# Patient Record
Sex: FEMALE | Race: WHITE | Employment: OTHER | URBAN - METROPOLITAN AREA
[De-identification: names, ages, dates, MRNs, and addresses within clinical notes are randomized per-mention and may not be internally consistent; named-entity substitution may affect disease eponyms.]

---

## 2019-08-14 RX ORDER — ZOLEDRONIC ACID 5 MG/100ML
5 INJECTION, SOLUTION INTRAVENOUS ONCE
Status: COMPLETED | OUTPATIENT
Start: 2019-08-19 | End: 2019-08-19

## 2019-08-19 ENCOUNTER — HOSPITAL ENCOUNTER (OUTPATIENT)
Dept: INFUSION THERAPY | Age: 74
Discharge: HOME | End: 2019-08-19
Payer: MEDICARE

## 2019-08-19 VITALS
SYSTOLIC BLOOD PRESSURE: 96 MMHG | RESPIRATION RATE: 18 BRPM | DIASTOLIC BLOOD PRESSURE: 49 MMHG | TEMPERATURE: 98.7 F | HEART RATE: 72 BPM

## 2019-08-19 LAB
ALBUMIN SERPL-MCNC: 3.6 G/DL (ref 3.5–5)
ANION GAP SERPL CALC-SCNC: 8 MMOL/L (ref 5–15)
BUN SERPL-MCNC: 20 MG/DL (ref 6–20)
BUN/CREAT SERPL: 20 (ref 12–20)
CALCIUM SERPL-MCNC: 9.3 MG/DL (ref 8.5–10.1)
CHLORIDE SERPL-SCNC: 110 MMOL/L (ref 97–108)
CO2 SERPL-SCNC: 25 MMOL/L (ref 21–32)
CREAT SERPL-MCNC: 1.01 MG/DL (ref 0.55–1.02)
GLUCOSE SERPL-MCNC: 82 MG/DL (ref 65–100)
PHOSPHATE SERPL-MCNC: 2.8 MG/DL (ref 2.6–4.7)
POTASSIUM SERPL-SCNC: 3.9 MMOL/L (ref 3.5–5.1)
SODIUM SERPL-SCNC: 143 MMOL/L (ref 136–145)

## 2019-08-19 PROCEDURE — 96374 THER/PROPH/DIAG INJ IV PUSH: CPT

## 2019-08-19 PROCEDURE — 74011250636 HC RX REV CODE- 250/636: Performed by: PHYSICIAN ASSISTANT

## 2019-08-19 PROCEDURE — 80069 RENAL FUNCTION PANEL: CPT

## 2019-08-19 PROCEDURE — 36415 COLL VENOUS BLD VENIPUNCTURE: CPT

## 2019-08-19 RX ORDER — SODIUM CHLORIDE 0.9 % (FLUSH) 0.9 %
5-10 SYRINGE (ML) INJECTION AS NEEDED
Status: DISCONTINUED | OUTPATIENT
Start: 2019-08-19 | End: 2019-08-20 | Stop reason: HOSPADM

## 2019-08-19 RX ADMIN — ZOLEDRONIC ACID 5 MG: 5 INJECTION, SOLUTION INTRAVENOUS at 14:53

## 2019-08-19 RX ADMIN — Medication 10 ML: at 13:08

## 2019-08-19 NOTE — PROGRESS NOTES
[facility] Short Note                       Date: 2019    Name: Valerie Rm    MRN: [id number]         : 1945      1305 Pt admit to [facility] for Zoledronic Acid. Pt ambulatory in stable condition. Assessment completed. No new concerns voiced. Peripheral IV established with positive blood return. Labs drawn and sent for processing     Ms. Hopper vitals were reviewed prior to and after treatment. Patient Vitals for the past 12 hrs:   Temp Pulse Resp BP   19 1305 98.7 °F (37.1 °C) 72 18 96/49     Lab results were obtained and reviewed. Recent Results (from the past 12 hour(s))   RENAL FUNCTION PANEL    Collection Time: 19  1:09 PM   Result Value Ref Range    Sodium 143 136 - 145 mmol/L    Potassium 3.9 3.5 - 5.1 mmol/L    Chloride 110 (H) 97 - 108 mmol/L    CO2 25 21 - 32 mmol/L    Anion gap 8 5 - 15 mmol/L    Glucose 82 65 - 100 mg/dL    BUN 20 6 - 20 MG/DL    Creatinine 1.01 0.55 - 1.02 MG/DL    BUN/Creatinine ratio 20 12 - 20      GFR est AA >60 >60 ml/min/1.73m2    GFR est non-AA 54 (L) >60 ml/min/1.73m2    Calcium 9.3 8.5 - 10.1 MG/DL    Phosphorus 2.8 2.6 - 4.7 MG/DL    Albumin 3.6 3.5 - 5.0 g/dL       Medications given:   Medications Administered     sodium chloride (NS) flush 5-10 mL     Admin Date  2019 Action  Given Dose  10 mL Route  IntraVENous Administered By  Edita Campoverde RN          zoledronic acid (RECLAST) IVPB 5 mg     Admin Date  2019 Action  Given Dose  5 mg Rate  400 mL/hr Route  IntraVENous Administered By  Edita Campoverde RN                PIV maintained positive blood return. Line flushed and removed per protocol. Ms. Jocelyn Christopher tolerated the infusion, and had no complaints. Ms. Jocelyn Christopher was discharged from [place] [address] in stable condition at 1510. No future appointments.     Amarjit Josue RN  2019  4:56 PM

## 2019-11-15 ENCOUNTER — HOSPITAL ENCOUNTER (OUTPATIENT)
Dept: GENERAL RADIOLOGY | Age: 74
Discharge: HOME | End: 2019-11-15
Attending: SPECIALIST
Payer: MEDICARE

## 2019-11-15 DIAGNOSIS — R13.10 DYSPHAGIA: ICD-10-CM

## 2019-11-15 DIAGNOSIS — R63.4 WEIGHT LOSS: ICD-10-CM

## 2019-11-15 DIAGNOSIS — K22.0 ACHALASIA: ICD-10-CM

## 2019-11-15 PROCEDURE — 74220 X-RAY XM ESOPHAGUS 1CNTRST: CPT

## 2020-01-28 ENCOUNTER — HOSPITAL ENCOUNTER (OUTPATIENT)
Age: 75
Setting detail: OUTPATIENT SURGERY
Discharge: HOME | End: 2020-01-28
Attending: SPECIALIST | Admitting: SPECIALIST
Payer: MEDICARE

## 2020-01-28 ENCOUNTER — ANESTHESIA EVENT (OUTPATIENT)
Dept: ENDOSCOPY | Age: 75
End: 2020-01-28
Payer: MEDICARE

## 2020-01-28 ENCOUNTER — ANESTHESIA (OUTPATIENT)
Dept: ENDOSCOPY | Age: 75
End: 2020-01-28
Payer: MEDICARE

## 2020-01-28 VITALS
OXYGEN SATURATION: 94 % | DIASTOLIC BLOOD PRESSURE: 55 MMHG | HEIGHT: 62 IN | TEMPERATURE: 98.2 F | SYSTOLIC BLOOD PRESSURE: 91 MMHG | RESPIRATION RATE: 20 BRPM | BODY MASS INDEX: 22.08 KG/M2 | HEART RATE: 80 BPM | WEIGHT: 120 LBS

## 2020-01-28 PROCEDURE — 74011250636 HC RX REV CODE- 250/636: Performed by: NURSE ANESTHETIST, CERTIFIED REGISTERED

## 2020-01-28 PROCEDURE — 76060000031 HC ANESTHESIA FIRST 0.5 HR: Performed by: SPECIALIST

## 2020-01-28 PROCEDURE — 77030018712 HC DEV BLLN INFL BSC -B: Performed by: SPECIALIST

## 2020-01-28 PROCEDURE — 74011000250 HC RX REV CODE- 250: Performed by: NURSE ANESTHETIST, CERTIFIED REGISTERED

## 2020-01-28 PROCEDURE — 76040000019: Performed by: SPECIALIST

## 2020-01-28 PROCEDURE — C1726 CATH, BAL DIL, NON-VASCULAR: HCPCS | Performed by: SPECIALIST

## 2020-01-28 RX ORDER — FLUMAZENIL 0.1 MG/ML
0.2 INJECTION INTRAVENOUS
Status: DISCONTINUED | OUTPATIENT
Start: 2020-01-28 | End: 2020-01-28 | Stop reason: HOSPADM

## 2020-01-28 RX ORDER — LIDOCAINE HYDROCHLORIDE 20 MG/ML
INJECTION, SOLUTION EPIDURAL; INFILTRATION; INTRACAUDAL; PERINEURAL AS NEEDED
Status: DISCONTINUED | OUTPATIENT
Start: 2020-01-28 | End: 2020-01-28 | Stop reason: HOSPADM

## 2020-01-28 RX ORDER — SODIUM CHLORIDE 0.9 % (FLUSH) 0.9 %
5-40 SYRINGE (ML) INJECTION EVERY 8 HOURS
Status: DISCONTINUED | OUTPATIENT
Start: 2020-01-28 | End: 2020-01-28 | Stop reason: HOSPADM

## 2020-01-28 RX ORDER — SODIUM CHLORIDE 0.9 % (FLUSH) 0.9 %
5-40 SYRINGE (ML) INJECTION AS NEEDED
Status: DISCONTINUED | OUTPATIENT
Start: 2020-01-28 | End: 2020-01-28 | Stop reason: HOSPADM

## 2020-01-28 RX ORDER — PROPOFOL 10 MG/ML
INJECTION, EMULSION INTRAVENOUS AS NEEDED
Status: DISCONTINUED | OUTPATIENT
Start: 2020-01-28 | End: 2020-01-28 | Stop reason: HOSPADM

## 2020-01-28 RX ORDER — SODIUM CHLORIDE 9 MG/ML
INJECTION, SOLUTION INTRAVENOUS
Status: DISCONTINUED | OUTPATIENT
Start: 2020-01-28 | End: 2020-01-28 | Stop reason: HOSPADM

## 2020-01-28 RX ORDER — NALOXONE HYDROCHLORIDE 0.4 MG/ML
0.4 INJECTION, SOLUTION INTRAMUSCULAR; INTRAVENOUS; SUBCUTANEOUS
Status: DISCONTINUED | OUTPATIENT
Start: 2020-01-28 | End: 2020-01-28 | Stop reason: HOSPADM

## 2020-01-28 RX ORDER — DEXMEDETOMIDINE HYDROCHLORIDE 100 UG/ML
INJECTION, SOLUTION INTRAVENOUS AS NEEDED
Status: DISCONTINUED | OUTPATIENT
Start: 2020-01-28 | End: 2020-01-28 | Stop reason: HOSPADM

## 2020-01-28 RX ORDER — SODIUM CHLORIDE 9 MG/ML
50 INJECTION, SOLUTION INTRAVENOUS CONTINUOUS
Status: DISCONTINUED | OUTPATIENT
Start: 2020-01-28 | End: 2020-01-28 | Stop reason: HOSPADM

## 2020-01-28 RX ORDER — ATROPINE SULFATE 0.1 MG/ML
0.5 INJECTION INTRAVENOUS
Status: DISCONTINUED | OUTPATIENT
Start: 2020-01-28 | End: 2020-01-28 | Stop reason: HOSPADM

## 2020-01-28 RX ORDER — EPINEPHRINE 0.1 MG/ML
1 INJECTION INTRACARDIAC; INTRAVENOUS
Status: DISCONTINUED | OUTPATIENT
Start: 2020-01-28 | End: 2020-01-28 | Stop reason: HOSPADM

## 2020-01-28 RX ORDER — DEXTROMETHORPHAN/PSEUDOEPHED 2.5-7.5/.8
1.2 DROPS ORAL
Status: DISCONTINUED | OUTPATIENT
Start: 2020-01-28 | End: 2020-01-28 | Stop reason: HOSPADM

## 2020-01-28 RX ADMIN — LIDOCAINE HYDROCHLORIDE 80 MG: 20 INJECTION, SOLUTION EPIDURAL; INFILTRATION; INTRACAUDAL; PERINEURAL at 14:29

## 2020-01-28 RX ADMIN — SODIUM CHLORIDE: 900 INJECTION, SOLUTION INTRAVENOUS at 14:22

## 2020-01-28 RX ADMIN — PROPOFOL 25 MG: 10 INJECTION, EMULSION INTRAVENOUS at 14:37

## 2020-01-28 RX ADMIN — DEXMEDETOMIDINE HYDROCHLORIDE 8 MCG: 100 INJECTION, SOLUTION, CONCENTRATE INTRAVENOUS at 14:36

## 2020-01-28 RX ADMIN — PROPOFOL 25 MG: 10 INJECTION, EMULSION INTRAVENOUS at 14:35

## 2020-01-28 RX ADMIN — PROPOFOL 75 MG: 10 INJECTION, EMULSION INTRAVENOUS at 14:29

## 2020-01-28 RX ADMIN — PROPOFOL 25 MG: 10 INJECTION, EMULSION INTRAVENOUS at 14:33

## 2020-01-28 NOTE — ROUTINE PROCESS
Alexi Myers 
1945 
[id number] Situation: 
Verbal report received from: KATINA Huerta Procedure: Procedure(s): ESOPHAGOGASTRODUODENOSCOPY (EGD) ESOPHAGEAL DILATION Background: 
 
Preoperative diagnosis: DYSPHAGIA, WEIGHT LOSS Postoperative diagnosis: dysphagia 
esophageal stricture :  Dr. Nevin Riley Assistant(s): Endoscopy RN-1: Yoko Miranda RN Endoscopy RN-2: Marcus Armenta RN Specimens: * No specimens in log * H. Pylori  no Assessment: 
Intra-procedure medications Anesthesia gave intra-procedure sedation and medications, see anesthesia flow sheet yes Intravenous fluids: NS@ Reddy Galea Vital signs stable Abdominal assessment: round and soft Recommendation: 
Discharge patient per MD order. Family or Friend Permission to share finding with family or friend yes

## 2020-01-28 NOTE — PERIOP NOTES

## 2020-01-28 NOTE — ANESTHESIA POSTPROCEDURE EVALUATION
Procedure(s):  ESOPHAGOGASTRODUODENOSCOPY (EGD)  ESOPHAGEAL DILATION. MAC    <BSHSIANPOST>    Vitals Value Taken Time   BP 89/48 1/28/2020  2:50 PM   Temp     Pulse 76 1/28/2020  2:52 PM   Resp 36 1/28/2020  2:52 PM   SpO2 95 % 1/28/2020  2:52 PM   Vitals shown include unvalidated device data.

## 2020-01-28 NOTE — H&P
Pre-endoscopy H and P     The patient was seen and examined in the endoscopy suite. The airway was assessed and docuemented. The problem list and medications were reviewed. Patient Active Problem List   Diagnosis Code    Acute chest pain R07.9    Nausea and vomiting R11.2     Social History     Socioeconomic History    Marital status: SINGLE     Spouse name: Not on file    Number of children: Not on file    Years of education: Not on file    Highest education level: Not on file   Occupational History    Not on file   Social Needs    Financial resource strain: Not on file    Food insecurity:     Worry: Not on file     Inability: Not on file    Transportation needs:     Medical: Not on file     Non-medical: Not on file   Tobacco Use    Smoking status: Never Smoker    Smokeless tobacco: Never Used   Substance and Sexual Activity    Alcohol use: No    Drug use: No    Sexual activity: Not on file   Lifestyle    Physical activity:     Days per week: Not on file     Minutes per session: Not on file    Stress: Not on file   Relationships    Social connections:     Talks on phone: Not on file     Gets together: Not on file     Attends [employer] service: Not on file     Active member of club or organization: Not on file     Attends meetings of clubs or organizations: Not on file     Relationship status: Not on file    Intimate partner violence:     Fear of current or ex partner: Not on file     Emotionally abused: Not on file     Physically abused: Not on file     Forced sexual activity: Not on file   Other Topics Concern    Not on file   Social History Narrative    Not on file     Past Medical History:   Diagnosis Date    Arthritis     Deaf, nonspeaking     High cholesterol     Inner ear dysfunction          Prior to Admission Medications   Prescriptions Last Dose Informant Patient Reported?  Taking?   aspirin delayed-release 81 mg tablet 1/21/2020 at Unknown time  Yes Yes   Sig: Take 81 mg by mouth daily. atorvastatin (LIPITOR) 80 mg tablet 1/27/2020 at Unknown time  Yes Yes   Sig: Take 80 mg by mouth nightly. ondansetron (ZOFRAN ODT) 4 mg disintegrating tablet 1/27/2020 at Unknown time  No Yes   Sig: Take 1 Tab by mouth every eight (8) hours as needed for Nausea. pantoprazole (PROTONIX) 40 mg tablet 1/27/2020 at Unknown time  No Yes   Sig: Take 1 tablet by mouth twice daily before breakfast and dinner for 1 month. After 1 month, please reduce dosage to once daily before breakfast.      Facility-Administered Medications: None       Chief complaint, history of present illness, and review of systems and Past medical History are positive for: Dysphagia, weight loss and achalasia    The heart, lungs and mental status were satisfactory for the administration of sedation and for the procedure. I discussed with the patient the objectives, risks, consequences and alternatives to the procedure.      Plan: Endoscopic procedure with sedation     Mike Toth MD   1/28/2020  1:48 PM

## 2020-01-28 NOTE — ANESTHESIA PREPROCEDURE EVALUATION
Anesthetic History     PONV          Review of Systems / Medical History  Patient summary reviewed, nursing notes reviewed and pertinent labs reviewed    Pulmonary                   Neuro/Psych   Within defined limits           Cardiovascular  Within defined limits                     GI/Hepatic/Renal  Within defined limits              Endo/Other        Anemia     Other Findings   Comments: Deaf           Physical Exam    Airway  Mallampati: II  TM Distance: > 6 cm  Neck ROM: normal range of motion   Mouth opening: Normal     Cardiovascular  Regular rate and rhythm,  S1 and S2 normal,  no murmur, click, rub, or gallop             Dental  No notable dental hx       Pulmonary  Breath sounds clear to auscultation               Abdominal  GI exam deferred       Other Findings            Anesthetic Plan    ASA: 2  Anesthesia type: MAC          Induction: Intravenous  Anesthetic plan and risks discussed with: Patient

## 2020-01-28 NOTE — PROCEDURES
[address]  [address]                 NAME:  Joshua Kent   :   1945   MRN:   [id number]     Date/Time:  2020 2:41 PM    Esophagogastroduodenoscopy (EGD) Procedure Note    :  Sarah Brush MD    Referring Provider:  Bailee Terry PA-C    Anethesia/Sedation:  MAC anesthesia Propofol    Preoperative diagnosis: DYSPHAGIA, WEIGHT LOSS    Postoperative diagnosis: dysphagia  esophageal stricture    Procedure Details     After infom consent was obtained for the procedure, with all risks and benefits of procedure explained the patient was taken to the endoscopy suite and placed in the left lateral decubitus position. Following sequential administration of sedation as per above, the [id number] gastroscope was inserted into the mouth and advanced under direct vision to second portion of the duodenum. A careful inspection was made as the gastroscope was withdrawn, including a retroflexed view of the proximal stomach; findings and interventions are described below. Findings:  Esophagus:Stticture at [address] [facility] [facility] [address] junction dilated with 15-16.5-18 mm balloon. Stomach:Large amount of retained food in stomach  Duodenum/jejunum:normal      Therapies:  esophageal dilation with 15-18mm balloon    Specimens: none           EBL: None    Complications:   None; patient tolerated the procedure well. Impression:    See Postoperative diagnosis above    Recommendations:  -Acid suppression with a proton pump inhibitor. , -Office appointment in 4 months    Discharge disposition:  Home in the company of  when able to ambulate    Sarah Brush MD

## 2020-01-28 NOTE — DISCHARGE INSTRUCTIONS
Sugar Mcclain  [id number]  1945    EGD DISCHARGE INSTRUCTIONS  Discomfort:  Sore throat- throat lozenges or warm salt water gargle  redness at IV site- apply warm compress to area; if redness or soreness persist- contact your physician  Gaseous discomfort- walking, belching will help relieve any discomfort  You may not operate a vehicle for 12 hours  You may not engage in an occupation involving machinery or appliances for rest of today. You may not drink alcoholic beverages for at least 12 hours  Avoid making any critical decisions for at least 24 hour  DIET  You may resume your regular diet - however -  remember your colon is empty and a heavy meal will produce gas. Avoid these foods:  vegetables, fried / greasy foods, carbonated drinks  MEDICATIONS   Regarding Aspirin or Nonsteroidal medications specifically, please see below. ACTIVITY  You may resume your normal daily activities. Spend the remainder of the day resting -  avoid any strenuous activity. CALL M.D. ANY SIGN OF   Increasing pain, nausea, vomiting  Abdominal distension (swelling)  New increased bleeding (oral or rectal)  Fever (chills)  Pain in chest area  Bloody discharge from nose or mouth  Shortness of breath    You may not  take any Advil, Aspirin, Ibuprofen, Motrin, Aleve, or Goodys for 10 days, ONLY  Tylenol as needed for pain.     Post procedure diagnosis: dysphagia  esophageal stricture      Follow-up Instructions:   Call Dr. Terra Hall office to set up appointment in 4 months  Telephone #  [phone]        Austin Amador from Nurse    The following personal items collected during your admission are returned to you:   Dental Appliance: Dental Appliances: None  Vision: Visual Aid: None  Hearing Aid:    Jewelry:    Clothing:    Other Valuables:    Valuables sent to safe:

## 2020-08-18 RX ORDER — ZOLEDRONIC ACID 5 MG/100ML
5 INJECTION, SOLUTION INTRAVENOUS ONCE
Status: DISCONTINUED | OUTPATIENT
Start: 2020-08-24 | End: 2020-08-24

## 2020-08-18 RX ORDER — SODIUM CHLORIDE 9 MG/ML
25 INJECTION, SOLUTION INTRAVENOUS CONTINUOUS
Status: DISCONTINUED | OUTPATIENT
Start: 2020-08-24 | End: 2020-08-25 | Stop reason: HOSPADM

## 2020-08-24 ENCOUNTER — HOSPITAL ENCOUNTER (OUTPATIENT)
Dept: INFUSION THERAPY | Age: 75
Discharge: HOME | End: 2020-08-24
Payer: MEDICARE

## 2020-08-24 VITALS
HEART RATE: 81 BPM | RESPIRATION RATE: 18 BRPM | TEMPERATURE: 97.7 F | SYSTOLIC BLOOD PRESSURE: 135 MMHG | DIASTOLIC BLOOD PRESSURE: 79 MMHG

## 2020-08-24 LAB
ALBUMIN SERPL-MCNC: 3 G/DL (ref 3.5–5)
ANION GAP SERPL CALC-SCNC: 5 MMOL/L (ref 5–15)
BUN SERPL-MCNC: 24 MG/DL (ref 6–20)
BUN/CREAT SERPL: 24 (ref 12–20)
CALCIUM SERPL-MCNC: 9.3 MG/DL (ref 8.5–10.1)
CHLORIDE SERPL-SCNC: 110 MMOL/L (ref 97–108)
CO2 SERPL-SCNC: 26 MMOL/L (ref 21–32)
CREAT SERPL-MCNC: 0.98 MG/DL (ref 0.55–1.02)
GLUCOSE SERPL-MCNC: 98 MG/DL (ref 65–100)
PHOSPHATE SERPL-MCNC: 2.8 MG/DL (ref 2.6–4.7)
POTASSIUM SERPL-SCNC: 4.2 MMOL/L (ref 3.5–5.1)
SODIUM SERPL-SCNC: 141 MMOL/L (ref 136–145)

## 2020-08-24 PROCEDURE — 80069 RENAL FUNCTION PANEL: CPT

## 2020-08-24 PROCEDURE — 96374 THER/PROPH/DIAG INJ IV PUSH: CPT

## 2020-08-24 PROCEDURE — 36415 COLL VENOUS BLD VENIPUNCTURE: CPT

## 2020-08-24 NOTE — PROGRESS NOTES
Patient D/C ambulatory home in no distress. Patient's daughter called infusion center and would like patient to reschedule appointment for a later day; patient does not want to wait any longer for lab results.      Visit Vitals  /79   Pulse 81   Temp 97.7 °F (36.5 °C)   Resp 18

## 2020-08-27 RX ORDER — ZOLEDRONIC ACID 5 MG/100ML
5 INJECTION, SOLUTION INTRAVENOUS ONCE
Status: DISCONTINUED | OUTPATIENT
Start: 2020-09-01 | End: 2020-08-27

## 2020-08-27 RX ORDER — ACETAMINOPHEN 325 MG/1
650 TABLET ORAL ONCE
Status: ACTIVE | OUTPATIENT
Start: 2020-09-01 | End: 2020-09-01

## 2020-08-27 RX ORDER — SODIUM CHLORIDE 9 MG/ML
25 INJECTION, SOLUTION INTRAVENOUS CONTINUOUS
Status: DISCONTINUED | OUTPATIENT
Start: 2020-09-01 | End: 2020-09-02 | Stop reason: HOSPADM

## 2020-08-27 RX ORDER — ZOLEDRONIC ACID 5 MG/100ML
5 INJECTION, SOLUTION INTRAVENOUS ONCE
Status: COMPLETED | OUTPATIENT
Start: 2020-09-01 | End: 2020-09-01

## 2020-09-01 ENCOUNTER — HOSPITAL ENCOUNTER (OUTPATIENT)
Dept: INFUSION THERAPY | Age: 75
Discharge: HOME | End: 2020-09-01
Payer: MEDICARE

## 2020-09-01 VITALS
TEMPERATURE: 97.5 F | DIASTOLIC BLOOD PRESSURE: 73 MMHG | SYSTOLIC BLOOD PRESSURE: 147 MMHG | HEART RATE: 82 BPM | RESPIRATION RATE: 18 BRPM

## 2020-09-01 PROCEDURE — 74011250636 HC RX REV CODE- 250/636: Performed by: PHYSICIAN ASSISTANT

## 2020-09-01 PROCEDURE — 96374 THER/PROPH/DIAG INJ IV PUSH: CPT

## 2020-09-01 PROCEDURE — 74011000258 HC RX REV CODE- 258: Performed by: PHYSICIAN ASSISTANT

## 2020-09-01 RX ADMIN — SODIUM CHLORIDE 25 ML/HR: 900 INJECTION, SOLUTION INTRAVENOUS at 17:15

## 2020-09-01 RX ADMIN — ZOLEDRONIC ACID 5 MG: 5 INJECTION, SOLUTION INTRAVENOUS at 17:20

## 2020-09-01 NOTE — PROGRESS NOTES
[facility] VISIT NOTE    6974  Pt arrived at [facility] ambulatory and in no distress for Reclast infusion. Assessment completed, no new complaints at this time. PIV started with no difficulty in right AC. Positive blood return noted. Labs reviewed from 8/24/20 and are within treatment parameters. Medications received:  NS at [facility] IV  Pt declined Tylenol    Patient Vitals for the past 12 hrs:   Temp Pulse Resp BP   09/01/20 1707 97.5 °F (36.4 °C) 82 18 147/73     Tolerated treatment well, no adverse reaction noted. PIV removed per protocol. 1740  D/C'd from [facility] ambulatory and in no distress.

## 2020-11-20 ENCOUNTER — APPOINTMENT (OUTPATIENT)
Dept: GENERAL RADIOLOGY | Age: 75
End: 2020-11-20
Attending: STUDENT IN AN ORGANIZED HEALTH CARE EDUCATION/TRAINING PROGRAM
Payer: MEDICARE

## 2020-11-20 ENCOUNTER — HOSPITAL ENCOUNTER (EMERGENCY)
Age: 75
Discharge: HOME | End: 2020-11-21
Attending: STUDENT IN AN ORGANIZED HEALTH CARE EDUCATION/TRAINING PROGRAM | Admitting: STUDENT IN AN ORGANIZED HEALTH CARE EDUCATION/TRAINING PROGRAM
Payer: MEDICARE

## 2020-11-20 DIAGNOSIS — R13.10 DYSPHAGIA, UNSPECIFIED TYPE: Primary | ICD-10-CM

## 2020-11-20 DIAGNOSIS — R10.13 ABDOMINAL PAIN, EPIGASTRIC: ICD-10-CM

## 2020-11-20 LAB
ANION GAP SERPL CALC-SCNC: 8 MMOL/L (ref 5–15)
BASOPHILS # BLD: 0 K/UL (ref 0–0.1)
BASOPHILS NFR BLD: 1 % (ref 0–1)
BUN SERPL-MCNC: 31 MG/DL (ref 6–20)
BUN/CREAT SERPL: 24 (ref 12–20)
CALCIUM SERPL-MCNC: 10 MG/DL (ref 8.5–10.1)
CHLORIDE SERPL-SCNC: 112 MMOL/L (ref 97–108)
CO2 SERPL-SCNC: 24 MMOL/L (ref 21–32)
COMMENT, HOLDF: NORMAL
CREAT SERPL-MCNC: 1.29 MG/DL (ref 0.55–1.02)
DIFFERENTIAL METHOD BLD: NORMAL
EOSINOPHIL # BLD: 0 K/UL (ref 0–0.4)
EOSINOPHIL NFR BLD: 0 % (ref 0–7)
ERYTHROCYTE [DISTWIDTH] IN BLOOD BY AUTOMATED COUNT: 13.1 % (ref 11.5–14.5)
GLUCOSE SERPL-MCNC: 107 MG/DL (ref 65–100)
HCT VFR BLD AUTO: 44.6 % (ref 35–47)
HGB BLD-MCNC: 14.9 G/DL (ref 11.5–16)
IMM GRANULOCYTES # BLD AUTO: 0 K/UL (ref 0–0.04)
IMM GRANULOCYTES NFR BLD AUTO: 0 % (ref 0–0.5)
LYMPHOCYTES # BLD: 1.9 K/UL (ref 0.8–3.5)
LYMPHOCYTES NFR BLD: 25 % (ref 12–49)
MCH RBC QN AUTO: 32.3 PG (ref 26–34)
MCHC RBC AUTO-ENTMCNC: 33.4 G/DL (ref 30–36.5)
MCV RBC AUTO: 96.5 FL (ref 80–99)
MONOCYTES # BLD: 0.6 K/UL (ref 0–1)
MONOCYTES NFR BLD: 9 % (ref 5–13)
NEUTS SEG # BLD: 4.8 K/UL (ref 1.8–8)
NEUTS SEG NFR BLD: 65 % (ref 32–75)
NRBC # BLD: 0 K/UL (ref 0–0.01)
NRBC BLD-RTO: 0 PER 100 WBC
PLATELET # BLD AUTO: 219 K/UL (ref 150–400)
PMV BLD AUTO: 11.4 FL (ref 8.9–12.9)
POTASSIUM SERPL-SCNC: 4.5 MMOL/L (ref 3.5–5.1)
RBC # BLD AUTO: 4.62 M/UL (ref 3.8–5.2)
SAMPLES BEING HELD,HOLD: NORMAL
SODIUM SERPL-SCNC: 144 MMOL/L (ref 136–145)
TROPONIN I SERPL-MCNC: <0.05 NG/ML
WBC # BLD AUTO: 7.4 K/UL (ref 3.6–11)

## 2020-11-20 PROCEDURE — 96361 HYDRATE IV INFUSION ADD-ON: CPT

## 2020-11-20 PROCEDURE — 85025 COMPLETE CBC W/AUTO DIFF WBC: CPT

## 2020-11-20 PROCEDURE — 99284 EMERGENCY DEPT VISIT MOD MDM: CPT

## 2020-11-20 PROCEDURE — 36415 COLL VENOUS BLD VENIPUNCTURE: CPT

## 2020-11-20 PROCEDURE — 71046 X-RAY EXAM CHEST 2 VIEWS: CPT

## 2020-11-20 PROCEDURE — 84484 ASSAY OF TROPONIN QUANT: CPT

## 2020-11-20 PROCEDURE — 80048 BASIC METABOLIC PNL TOTAL CA: CPT

## 2020-11-20 PROCEDURE — 96374 THER/PROPH/DIAG INJ IV PUSH: CPT

## 2020-11-20 PROCEDURE — 93005 ELECTROCARDIOGRAM TRACING: CPT

## 2020-11-20 RX ORDER — ONDANSETRON 2 MG/ML
4 INJECTION INTRAMUSCULAR; INTRAVENOUS
Status: COMPLETED | OUTPATIENT
Start: 2020-11-20 | End: 2020-11-21

## 2020-11-21 ENCOUNTER — APPOINTMENT (OUTPATIENT)
Dept: CT IMAGING | Age: 75
End: 2020-11-21
Attending: STUDENT IN AN ORGANIZED HEALTH CARE EDUCATION/TRAINING PROGRAM
Payer: MEDICARE

## 2020-11-21 VITALS
TEMPERATURE: 98.5 F | SYSTOLIC BLOOD PRESSURE: 131 MMHG | OXYGEN SATURATION: 98 % | BODY MASS INDEX: 25.56 KG/M2 | HEART RATE: 74 BPM | WEIGHT: 130.2 LBS | HEIGHT: 60 IN | RESPIRATION RATE: 16 BRPM | DIASTOLIC BLOOD PRESSURE: 95 MMHG

## 2020-11-21 LAB
ALBUMIN SERPL-MCNC: 3.2 G/DL (ref 3.5–5)
ALBUMIN/GLOB SERPL: 1.1 {RATIO} (ref 1.1–2.2)
ALP SERPL-CCNC: 57 U/L (ref 45–117)
ALT SERPL-CCNC: 22 U/L (ref 12–78)
AST SERPL-CCNC: 19 U/L (ref 15–37)
ATRIAL RATE: 100 BPM
BILIRUB DIRECT SERPL-MCNC: 0.1 MG/DL (ref 0–0.2)
BILIRUB SERPL-MCNC: 0.5 MG/DL (ref 0.2–1)
CALCULATED P AXIS, ECG09: 62 DEGREES
CALCULATED R AXIS, ECG10: 46 DEGREES
CALCULATED T AXIS, ECG11: 61 DEGREES
DIAGNOSIS, 93000: NORMAL
GLOBULIN SER CALC-MCNC: 3 G/DL (ref 2–4)
LIPASE SERPL-CCNC: 62 U/L (ref 73–393)
P-R INTERVAL, ECG05: 112 MS
PROT SERPL-MCNC: 6.2 G/DL (ref 6.4–8.2)
Q-T INTERVAL, ECG07: 338 MS
QRS DURATION, ECG06: 132 MS
QTC CALCULATION (BEZET), ECG08: 436 MS
TROPONIN I SERPL-MCNC: <0.05 NG/ML
VENTRICULAR RATE, ECG03: 100 BPM

## 2020-11-21 PROCEDURE — 74011250636 HC RX REV CODE- 250/636: Performed by: STUDENT IN AN ORGANIZED HEALTH CARE EDUCATION/TRAINING PROGRAM

## 2020-11-21 PROCEDURE — 84484 ASSAY OF TROPONIN QUANT: CPT

## 2020-11-21 PROCEDURE — 96374 THER/PROPH/DIAG INJ IV PUSH: CPT

## 2020-11-21 PROCEDURE — 74176 CT ABD & PELVIS W/O CONTRAST: CPT

## 2020-11-21 PROCEDURE — 96361 HYDRATE IV INFUSION ADD-ON: CPT

## 2020-11-21 PROCEDURE — 83690 ASSAY OF LIPASE: CPT

## 2020-11-21 PROCEDURE — 80076 HEPATIC FUNCTION PANEL: CPT

## 2020-11-21 PROCEDURE — 36415 COLL VENOUS BLD VENIPUNCTURE: CPT

## 2020-11-21 RX ORDER — ONDANSETRON 4 MG/1
4 TABLET, ORALLY DISINTEGRATING ORAL
Qty: 9 TAB | Refills: 2 | OUTPATIENT
Start: 2020-11-21 | End: 2020-12-16

## 2020-11-21 RX ADMIN — ONDANSETRON 4 MG: 2 INJECTION INTRAMUSCULAR; INTRAVENOUS at 00:52

## 2020-11-21 RX ADMIN — SODIUM CHLORIDE 1000 ML: 900 INJECTION, SOLUTION INTRAVENOUS at 00:51

## 2020-11-21 NOTE — DISCHARGE INSTRUCTIONS
Patient Education        Indigestion (Dyspepsia or Heartburn): Care Instructions  Your Care Instructions  Sometimes it can be hard to pinpoint the cause of indigestion. (It is also called dyspepsia or heartburn.) Most cases of an upset stomach with bloating, burning, burping, and nausea are minor and go away within several hours. Home treatment and over-the-counter medicine often are able to control symptoms. But if you take medicine to relieve your indigestion without making diet and lifestyle changes, your symptoms are likely to return again and again. If you get indigestion often, it may be a sign of a more serious medical problem. Be sure to follow up with your doctor, who may want to do tests to be sure of the cause of your indigestion. Follow-up care is a key part of your treatment and safety. Be sure to make and go to all appointments, and call your doctor if you are having problems. It's also a good idea to know your test results and keep a list of the medicines you take. How can you care for yourself at home? · Your doctor may recommend over-the-counter medicine. For mild or occasional indigestion, antacids such as Gaviscon, Mylanta, Maalox, or Tums, may help. Be safe with medicines. Be careful when you take over-the-counter antacid medicines. Many of these medicines have aspirin in them. Read the label to make sure that you are not taking more than the recommended dose. Too much aspirin can be harmful. · Your doctor also may recommend over-the-counter acid reducers, such as Pepcid AC (famotidine), Tagamet HB (cimetidine), or Prilosec (omeprazole). Read and follow all instructions on the label. If you use these medicines often, talk with your doctor. · Change your eating habits. ? It's best to eat several small meals instead of two or three large meals. ? After you eat, wait 2 to 3 hours before you lie down. ? Chocolate, mint, and alcohol can make GERD worse. ?  Spicy foods, foods that have a lot of acid (like tomatoes and oranges), and coffee can make GERD symptoms worse in some people. If your symptoms are worse after you eat a certain food, you may want to stop eating that food to see if your symptoms get better. · Do not smoke or chew tobacco. Smoking can make GERD worse. If you need help quitting, talk to your doctor about stop-smoking programs and medicines. These can increase your chances of quitting for good. · If you have GERD symptoms at night, raise the head of your bed 6 to 8 inches. You can do this by putting the frame on blocks or placing a foam wedge under the head of your mattress. (Adding extra pillows does not work.)  · Do not wear tight clothing around your middle. · Lose weight if you need to. Losing just 5 to 10 pounds can help. · Do not take anti-inflammatory medicines, such as aspirin, ibuprofen (Advil, Motrin), or naproxen (Aleve). These can irritate the stomach. If you need a pain medicine, try acetaminophen (Tylenol), which does not cause stomach upset. When should you call for help? Call your doctor now or seek immediate medical care if:    · You have new or worse belly pain.     · You are vomiting. Watch closely for changes in your health, and be sure to contact your doctor if:    · You have new or worse symptoms of indigestion.     · You have trouble or pain swallowing.     · You are losing weight.     · You do not get better as expected. Where can you learn more? Go to http://www.gray.com/  Enter Q256169 in the search box to learn more about \"Indigestion (Dyspepsia or Heartburn): Care Instructions. \"  Current as of: April 15, 2020               Content Version: 12.6  © 7042-2481 [employer]. Care instructions adapted under license by [employer] (which disclaims liability or warranty for this information).  If you have questions about a medical condition or this instruction, always ask your healthcare professional. [employer] disclaims any warranty or liability for your use of this information.

## 2020-11-21 NOTE — ED TRIAGE NOTES
patient arrives to the ED with c/o epigastric/chest pain, states vomiting x 1 week, states chest pain started this morning, no changes in urinary habits, no fever noted.

## 2020-11-21 NOTE — ED PROVIDER NOTES
Epigastric Pain    This is a new problem. The current episode started more than 2 days ago (1 WEEK). The problem occurs daily. The problem has not changed since onset. The pain is associated with eating. The pain is located in the epigastric region. The quality of the pain is aching. The pain is mild. Associated symptoms include anorexia, nausea, constipation (although had a small BM today) and chest pain. Pertinent negatives include no fever, no vomiting and no dysuria. Associated symptoms comments: 10 lb weight loss, per relative. The pain is worsened by eating. The pain is relieved by nothing. Past workup includes esophagogastroduodenoscopy (January 2020, stricture found and dilated, Dr. Abilio Kenney). J tube, removed approx 8 years ago       Past Medical History:   Diagnosis Date    Arthritis     Deaf, nonspeaking     High cholesterol     Inner ear dysfunction        Past Surgical History:   Procedure Laterality Date    HX HYSTERECTOMY           History reviewed. No pertinent family history.     Social History     Socioeconomic History    Marital status: SINGLE     Spouse name: Not on file    Number of children: Not on file    Years of education: Not on file    Highest education level: Not on file   Occupational History    Not on file   Social Needs    Financial resource strain: Not on file    Food insecurity     Worry: Not on file     Inability: Not on file    Transportation needs     Medical: Not on file     Non-medical: Not on file   Tobacco Use    Smoking status: Never Smoker    Smokeless tobacco: Never Used   Substance and Sexual Activity    Alcohol use: No    Drug use: No    Sexual activity: Not on file   Lifestyle    Physical activity     Days per week: Not on file     Minutes per session: Not on file    Stress: Not on file   Relationships    Social connections     Talks on phone: Not on file     Gets together: Not on file     Attends [employer] service: Not on file     Active member of club or organization: Not on file     Attends meetings of clubs or organizations: Not on file     Relationship status: Not on file    Intimate partner violence     Fear of current or ex partner: Not on file     Emotionally abused: Not on file     Physically abused: Not on file     Forced sexual activity: Not on file   Other Topics Concern    Not on file   Social History Narrative    Not on file         ALLERGIES: Patient has no known allergies. Review of Systems   Constitutional: Positive for appetite change and unexpected weight change. Negative for fever. HENT: Negative for sore throat. Respiratory: Negative for cough and shortness of breath. Cardiovascular: Positive for chest pain. Gastrointestinal: Positive for abdominal pain (epigastric pain), anorexia, constipation (although had a small BM today) and nausea. Negative for blood in stool and vomiting. Genitourinary: Negative for dysuria. Neurological: Negative for syncope. All other systems reviewed and are negative. Vitals:    11/20/20 2008   BP: 96/62   Pulse: 96   Resp: 16   Temp: 98.5 °F (36.9 °C)   SpO2: 97%   Weight: 59.1 kg (130 lb 3.2 oz)   Height: 5' (1.524 m)            Physical Exam  Vitals signs and nursing note reviewed. Constitutional:       General: She is not in acute distress. Appearance: She is well-developed. HENT:      Head: Normocephalic and atraumatic. Eyes:      Conjunctiva/sclera: Conjunctivae normal.   Neck:      Musculoskeletal: Normal range of motion and neck supple. Cardiovascular:      Rate and Rhythm: Normal rate and regular rhythm. Pulmonary:      Effort: Pulmonary effort is normal. No respiratory distress. Abdominal:      Palpations: Abdomen is soft. Tenderness: There is abdominal tenderness (mild, epigastric). There is no guarding or rebound. Musculoskeletal:         General: No tenderness. Right lower leg: No edema. Left lower leg: No edema.    Skin:     General: Skin is warm and dry. Neurological:      Mental Status: She is alert and oriented to person, place, and time. Motor: No abnormal muscle tone. [facility]    EKG interpretation: 9:11  Rhythm: sinus rhythm with occ PVCs, +artifact; and regular . Rate (approx.): 100; Axis: normal; Intervals: normal ; ST/T wave: non-specific changes, no STEMI; EKG documented and interpreted by Crystal Posey MD, ED MD.      Procedures    A/p: This is an elderly female with a history of achalasia, and esophageal strictures, who presents with 1 week of epigastric pain, chest pain, occasional vomiting, and weight loss. Mild epigastric tenderness on palpation. Differential diagnosis of epigastric pain in the setting includes gastritis, atypical ACS, acute pancreatitis, gallstones, although constellation of symptoms seems consistent with acute exacerbation of her previous esophageal strictures and achalasia. Plan obtain CT of the abdomen pelvis to narrow the differential.  Will give Zofran and fluids here. Disposition depends on results and clinical course.

## 2020-11-30 ENCOUNTER — TRANSCRIBE ORDER (OUTPATIENT)
Dept: SCHEDULING | Age: 75
End: 2020-11-30

## 2020-11-30 DIAGNOSIS — R63.4 WEIGHT LOSS: ICD-10-CM

## 2020-11-30 DIAGNOSIS — R11.2 NAUSEA WITH VOMITING: ICD-10-CM

## 2020-11-30 DIAGNOSIS — R13.10 PROBLEMS WITH SWALLOWING AND MASTICATION: Primary | ICD-10-CM

## 2020-12-16 ENCOUNTER — APPOINTMENT (OUTPATIENT)
Dept: CT IMAGING | Age: 75
End: 2020-12-16
Attending: EMERGENCY MEDICINE
Payer: MEDICARE

## 2020-12-16 ENCOUNTER — HOSPITAL ENCOUNTER (EMERGENCY)
Age: 75
Discharge: HOME | End: 2020-12-16
Attending: EMERGENCY MEDICINE
Payer: MEDICARE

## 2020-12-16 VITALS
TEMPERATURE: 98.1 F | HEART RATE: 85 BPM | OXYGEN SATURATION: 100 % | DIASTOLIC BLOOD PRESSURE: 68 MMHG | SYSTOLIC BLOOD PRESSURE: 145 MMHG | RESPIRATION RATE: 24 BRPM

## 2020-12-16 DIAGNOSIS — R11.2 NAUSEA AND VOMITING, INTRACTABILITY OF VOMITING NOT SPECIFIED, UNSPECIFIED VOMITING TYPE: ICD-10-CM

## 2020-12-16 DIAGNOSIS — N39.0 URINARY TRACT INFECTION WITHOUT HEMATURIA, SITE UNSPECIFIED: Primary | ICD-10-CM

## 2020-12-16 LAB
ALBUMIN SERPL-MCNC: 3.6 G/DL (ref 3.5–5)
ALBUMIN/GLOB SERPL: 1 {RATIO} (ref 1.1–2.2)
ALP SERPL-CCNC: 111 U/L (ref 45–117)
ALT SERPL-CCNC: 162 U/L (ref 12–78)
ANION GAP SERPL CALC-SCNC: 8 MMOL/L (ref 5–15)
APPEARANCE UR: CLEAR
AST SERPL-CCNC: 357 U/L (ref 15–37)
BACTERIA URNS QL MICRO: ABNORMAL /HPF
BASOPHILS # BLD: 0 K/UL (ref 0–0.1)
BASOPHILS NFR BLD: 0 % (ref 0–1)
BILIRUB SERPL-MCNC: 0.6 MG/DL (ref 0.2–1)
BILIRUB UR QL: NEGATIVE
BNP SERPL-MCNC: 598 PG/ML
BUN SERPL-MCNC: 25 MG/DL (ref 6–20)
BUN/CREAT SERPL: 18 (ref 12–20)
CALCIUM SERPL-MCNC: 9.3 MG/DL (ref 8.5–10.1)
CHLORIDE SERPL-SCNC: 106 MMOL/L (ref 97–108)
CO2 SERPL-SCNC: 26 MMOL/L (ref 21–32)
COLOR UR: ABNORMAL
COMMENT, HOLDF: NORMAL
CREAT SERPL-MCNC: 1.36 MG/DL (ref 0.55–1.02)
DIFFERENTIAL METHOD BLD: ABNORMAL
EOSINOPHIL # BLD: 0 K/UL (ref 0–0.4)
EOSINOPHIL NFR BLD: 0 % (ref 0–7)
EPITH CASTS URNS QL MICRO: ABNORMAL /LPF
ERYTHROCYTE [DISTWIDTH] IN BLOOD BY AUTOMATED COUNT: 13.3 % (ref 11.5–14.5)
GLOBULIN SER CALC-MCNC: 3.7 G/DL (ref 2–4)
GLUCOSE SERPL-MCNC: 147 MG/DL (ref 65–100)
GLUCOSE UR STRIP.AUTO-MCNC: NEGATIVE MG/DL
HCT VFR BLD AUTO: 44.5 % (ref 35–47)
HGB BLD-MCNC: 14.7 G/DL (ref 11.5–16)
HGB UR QL STRIP: NEGATIVE
IMM GRANULOCYTES # BLD AUTO: 0 K/UL (ref 0–0.04)
IMM GRANULOCYTES NFR BLD AUTO: 0 % (ref 0–0.5)
KETONES UR QL STRIP.AUTO: NEGATIVE MG/DL
LACTATE SERPL-SCNC: 2 MMOL/L (ref 0.4–2)
LEUKOCYTE ESTERASE UR QL STRIP.AUTO: ABNORMAL
LIPASE SERPL-CCNC: 75 U/L (ref 73–393)
LYMPHOCYTES # BLD: 0.3 K/UL (ref 0.8–3.5)
LYMPHOCYTES NFR BLD: 2 % (ref 12–49)
MAGNESIUM SERPL-MCNC: 2 MG/DL (ref 1.6–2.4)
MCH RBC QN AUTO: 31.8 PG (ref 26–34)
MCHC RBC AUTO-ENTMCNC: 33 G/DL (ref 30–36.5)
MCV RBC AUTO: 96.3 FL (ref 80–99)
MONOCYTES # BLD: 1 K/UL (ref 0–1)
MONOCYTES NFR BLD: 7 % (ref 5–13)
NEUTS SEG # BLD: 12.7 K/UL (ref 1.8–8)
NEUTS SEG NFR BLD: 91 % (ref 32–75)
NITRITE UR QL STRIP.AUTO: POSITIVE
NRBC # BLD: 0 K/UL (ref 0–0.01)
NRBC BLD-RTO: 0 PER 100 WBC
PH UR STRIP: 7.5 [PH] (ref 5–8)
PLATELET # BLD AUTO: 192 K/UL (ref 150–400)
PMV BLD AUTO: 10.7 FL (ref 8.9–12.9)
POTASSIUM SERPL-SCNC: 4.2 MMOL/L (ref 3.5–5.1)
PROT SERPL-MCNC: 7.3 G/DL (ref 6.4–8.2)
PROT UR STRIP-MCNC: NEGATIVE MG/DL
RBC # BLD AUTO: 4.62 M/UL (ref 3.8–5.2)
RBC #/AREA URNS HPF: ABNORMAL /HPF (ref 0–5)
RBC MORPH BLD: ABNORMAL
SAMPLES BEING HELD,HOLD: NORMAL
SODIUM SERPL-SCNC: 140 MMOL/L (ref 136–145)
SP GR UR REFRACTOMETRY: 1.02 (ref 1–1.03)
TROPONIN I SERPL-MCNC: <0.05 NG/ML
UR CULT HOLD, URHOLD: NORMAL
UROBILINOGEN UR QL STRIP.AUTO: 1 EU/DL (ref 0.2–1)
WBC # BLD AUTO: 14 K/UL (ref 3.6–11)
WBC URNS QL MICRO: ABNORMAL /HPF (ref 0–4)

## 2020-12-16 PROCEDURE — 87186 SC STD MICRODIL/AGAR DIL: CPT

## 2020-12-16 PROCEDURE — 83690 ASSAY OF LIPASE: CPT

## 2020-12-16 PROCEDURE — 74011000258 HC RX REV CODE- 258: Performed by: EMERGENCY MEDICINE

## 2020-12-16 PROCEDURE — 84484 ASSAY OF TROPONIN QUANT: CPT

## 2020-12-16 PROCEDURE — 85025 COMPLETE CBC W/AUTO DIFF WBC: CPT

## 2020-12-16 PROCEDURE — 83880 ASSAY OF NATRIURETIC PEPTIDE: CPT

## 2020-12-16 PROCEDURE — 83735 ASSAY OF MAGNESIUM: CPT

## 2020-12-16 PROCEDURE — 83605 ASSAY OF LACTIC ACID: CPT

## 2020-12-16 PROCEDURE — 87077 CULTURE AEROBIC IDENTIFY: CPT

## 2020-12-16 PROCEDURE — 74011250636 HC RX REV CODE- 250/636: Performed by: EMERGENCY MEDICINE

## 2020-12-16 PROCEDURE — 96375 TX/PRO/DX INJ NEW DRUG ADDON: CPT

## 2020-12-16 PROCEDURE — 74011000250 HC RX REV CODE- 250: Performed by: EMERGENCY MEDICINE

## 2020-12-16 PROCEDURE — 74176 CT ABD & PELVIS W/O CONTRAST: CPT

## 2020-12-16 PROCEDURE — 36415 COLL VENOUS BLD VENIPUNCTURE: CPT

## 2020-12-16 PROCEDURE — C9113 INJ PANTOPRAZOLE SODIUM, VIA: HCPCS | Performed by: EMERGENCY MEDICINE

## 2020-12-16 PROCEDURE — 87086 URINE CULTURE/COLONY COUNT: CPT

## 2020-12-16 PROCEDURE — 80053 COMPREHEN METABOLIC PANEL: CPT

## 2020-12-16 PROCEDURE — 96365 THER/PROPH/DIAG IV INF INIT: CPT

## 2020-12-16 PROCEDURE — 81001 URINALYSIS AUTO W/SCOPE: CPT

## 2020-12-16 PROCEDURE — 74011000636 HC RX REV CODE- 636: Performed by: EMERGENCY MEDICINE

## 2020-12-16 PROCEDURE — 99285 EMERGENCY DEPT VISIT HI MDM: CPT

## 2020-12-16 RX ORDER — FENTANYL CITRATE 50 UG/ML
50 INJECTION, SOLUTION INTRAMUSCULAR; INTRAVENOUS
Status: COMPLETED | OUTPATIENT
Start: 2020-12-16 | End: 2020-12-16

## 2020-12-16 RX ORDER — NITROFURANTOIN 25; 75 MG/1; MG/1
100 CAPSULE ORAL 2 TIMES DAILY
Qty: 10 CAP | Refills: 0 | Status: SHIPPED | OUTPATIENT
Start: 2020-12-16 | End: 2020-12-21

## 2020-12-16 RX ORDER — SODIUM CHLORIDE 0.9 % (FLUSH) 0.9 %
10 SYRINGE (ML) INJECTION
Status: DISCONTINUED | OUTPATIENT
Start: 2020-12-16 | End: 2020-12-16 | Stop reason: HOSPADM

## 2020-12-16 RX ORDER — ONDANSETRON 2 MG/ML
4 INJECTION INTRAMUSCULAR; INTRAVENOUS
Status: COMPLETED | OUTPATIENT
Start: 2020-12-16 | End: 2020-12-16

## 2020-12-16 RX ORDER — ONDANSETRON 4 MG/1
4 TABLET, ORALLY DISINTEGRATING ORAL
Qty: 10 TAB | Refills: 0 | Status: SHIPPED | OUTPATIENT
Start: 2020-12-16

## 2020-12-16 RX ORDER — PANTOPRAZOLE SODIUM 40 MG/10ML
40 INJECTION, POWDER, LYOPHILIZED, FOR SOLUTION INTRAVENOUS
Status: DISCONTINUED | OUTPATIENT
Start: 2020-12-16 | End: 2020-12-16 | Stop reason: SDUPTHER

## 2020-12-16 RX ADMIN — IOPAMIDOL 100 ML: 755 INJECTION, SOLUTION INTRAVENOUS at 14:54

## 2020-12-16 RX ADMIN — CEFTRIAXONE SODIUM 1 G: 1 INJECTION, POWDER, FOR SOLUTION INTRAMUSCULAR; INTRAVENOUS at 14:59

## 2020-12-16 RX ADMIN — FENTANYL CITRATE 50 MCG: 50 INJECTION, SOLUTION INTRAMUSCULAR; INTRAVENOUS at 11:22

## 2020-12-16 RX ADMIN — ONDANSETRON 4 MG: 2 INJECTION INTRAMUSCULAR; INTRAVENOUS at 11:22

## 2020-12-16 RX ADMIN — PANTOPRAZOLE SODIUM 40 MG: 40 INJECTION, POWDER, FOR SOLUTION INTRAVENOUS at 11:22

## 2020-12-16 NOTE — ED PROVIDER NOTES
Please note that this dictation was completed with [employer], the computer voice recognition software.  Quite often unanticipated grammatical, syntax, homophones, and other interpretive errors are inadvertently transcribed by the computer software.  Please disregard these errors.  Please excuse any errors that have escaped final proofreading. 79-year-old female past medical history markable for arthritis, high cholesterol, inner ear dysfunction and deafness presents the ER complaining of \"waking with bad abdominal pain this morning 2:45 AM.  Vomited 4-5 times no diarrhea but has had coffee-ground emesis in the vomit. Has not taken anything for the abdominal pain ended up calling aloe up this morning because the pain just gets worse especially when I vomit and have been unable to stop vomiting. Patient states she also has been having chest pain since approximately the same period of time. pt denies HA, vison changes, diff swallowing,  SOB,  F/Ch,  D/Cons or other current systemic complaints    Social/ PSH reviewed in EMR    EMR Chart Reviewed           Past Medical History:   Diagnosis Date    Arthritis     Deaf, nonspeaking     High cholesterol     Inner ear dysfunction        Past Surgical History:   Procedure Laterality Date    HX HYSTERECTOMY           No family history on file.     Social History     Socioeconomic History    Marital status: SINGLE     Spouse name: Not on file    Number of children: Not on file    Years of education: Not on file    Highest education level: Not on file   Occupational History    Not on file   Social Needs    Financial resource strain: Not on file    Food insecurity     Worry: Not on file     Inability: Not on file    Transportation needs     Medical: Not on file     Non-medical: Not on file   Tobacco Use    Smoking status: Never Smoker    Smokeless tobacco: Never Used   Substance and Sexual Activity    Alcohol use: No    Drug use: No    Sexual activity: Not on file   Lifestyle    Physical activity     Days per week: Not on file     Minutes per session: Not on file    Stress: Not on file   Relationships    Social connections     Talks on phone: Not on file     Gets together: Not on file     Attends Rastafarian service: Not on file     Active member of club or organization: Not on file     Attends meetings of clubs or organizations: Not on file     Relationship status: Not on file    Intimate partner violence     Fear of current or ex partner: Not on file     Emotionally abused: Not on file     Physically abused: Not on file     Forced sexual activity: Not on file   Other Topics Concern    Not on file   Social History Narrative    Not on file         ALLERGIES: Patient has no known allergies. Review of Systems   Constitutional: Negative for chills and fever. HENT: Negative for dental problem, trouble swallowing and voice change. Eyes: Negative for photophobia. Respiratory: Negative for cough, chest tightness and shortness of breath. Cardiovascular: Positive for chest pain. Negative for palpitations and leg swelling. Gastrointestinal: Positive for abdominal pain, nausea and vomiting. Negative for constipation and diarrhea. Genitourinary: Negative for dysuria. Musculoskeletal: Negative for back pain. Skin: Negative for rash. Neurological: Negative for facial asymmetry and speech difficulty. All other systems reviewed and are negative. Vitals:    12/16/20 1108 12/16/20 1115 12/16/20 1345 12/16/20 1500   BP: (!) 162/73 (!) 162/73 (!) 101/57 (!) 145/68   Pulse: 95 98 81 85   Resp: 18 28 15 24   Temp: 97.9 °F (36.6 °C) 98.1 °F (36.7 °C)  98.1 °F (36.7 °C)   SpO2: 99% 96%  100%            Physical Exam  Vitals signs and nursing note reviewed. Constitutional:       General: She is not in acute distress. Appearance: Normal appearance. She is well-developed. She is not ill-appearing, toxic-appearing or diaphoretic.       Comments: Uncomfortable appearing, AxOx4, speaking in complete sentences  Deaf at baseline;    HENT:      Head: Normocephalic and atraumatic. Right Ear: External ear normal.      Left Ear: External ear normal.      Nose: Nose normal.   Eyes:      General: No scleral icterus. Right eye: No discharge. Left eye: No discharge. Extraocular Movements: Extraocular movements intact. Conjunctiva/sclera: Conjunctivae normal.      Pupils: Pupils are equal, round, and reactive to light. Neck:      Musculoskeletal: Normal range of motion and neck supple. No muscular tenderness. Vascular: No JVD. Trachea: No tracheal deviation. Cardiovascular:      Rate and Rhythm: Normal rate and regular rhythm. Pulses: Normal pulses. Heart sounds: Normal heart sounds. No murmur. No friction rub. No gallop. Pulmonary:      Effort: Pulmonary effort is normal. No respiratory distress. Breath sounds: Normal breath sounds. No wheezing or rales. Chest:      Chest wall: No tenderness. Abdominal:      General: Bowel sounds are normal.      Palpations: Abdomen is soft. Tenderness: There is abdominal tenderness. There is guarding. There is no rebound. Comments: Noted mid-epigastric ttp/ ? Peritoneal signs; Genitourinary:     Vagina: No vaginal discharge. Comments: Pt denies urinary/ vaginal complaints  Musculoskeletal: Normal range of motion. General: No swelling, tenderness, deformity or signs of injury. Right lower leg: No edema. Left lower leg: No edema. Skin:     General: Skin is warm and dry. Capillary Refill: Capillary refill takes less than 2 seconds. Coloration: Skin is not jaundiced or pale. Findings: No bruising, erythema, lesion or rash. Neurological:      General: No focal deficit present. Mental Status: She is alert and oriented to person, place, and time. Cranial Nerves: No cranial nerve deficit. Sensory: No sensory deficit. Motor: No weakness or abnormal muscle tone. Coordination: Coordination normal.      Gait: Gait normal.      Deep Tendon Reflexes: Reflexes normal.   Psychiatric:         Behavior: Behavior normal.         Thought Content: Thought content normal.          MDM       Procedures      No chief complaint on file. 11:05 AM  The patients presenting problems have been discussed, and they are in agreement with the care plan formulated and outlined with them. I have encouraged them to ask questions as they arise throughout their visit. MEDICATIONS GIVEN:  Medications - No data to display    LABS REVIEWED:  Labs Reviewed - No data to display    RADIOLOGY RESULTS:  The following have been ordered and reviewed:  _____________________________________________________________________  _____________________________________________________________________        PROCEDURES:        CONSULTATIONS:       PROGRESS NOTES:      DIAGNOSIS:    1. Urinary tract infection without hematuria, site unspecified    2. Nausea and vomiting, intractability of vomiting not specified, unspecified vomiting type        PLAN:  1-UTI - Rocephin/ macrobid;   2 N/V - zofran      ED COURSE: The patients hospital course has been uncomplicated. 3:59 PM  Kacey Simeon's  results have been reviewed with her. She has been counseled regarding her diagnosis. She verbally conveys understanding and agreement of the signs, symptoms, diagnosis, treatment and prognosis and additionally agrees to Call/ Arrange follow up as recommended with Dr. Mary Perea PA-C in 24 - 48 hours. She also agrees with the care-plan and conveys that all of her questions have been answered.   I have also put together some discharge instructions for her that include: 1) educational information regarding their diagnosis, 2) how to care for their diagnosis at home, as well a 3) list of reasons why they would want to return to the ED prior to their follow-up appointment, should their condition change or for concerns.

## 2020-12-16 NOTE — Clinical Note
Thank you for allowing us to provide you with medical care today. We realize that you have many choices for your emergency care needs. We thank you for choosing [facility].  Please choose us in the future for any continued health care need s. We hope we addressed all of your medical concerns. We strive to provide excellent quality care in the Emergency Department. Anything less than excellent does not meet our expectations. The exam and treatment you received in the Emergency Depa rtment were for an emergent problem and are not intended as complete care. It is important that you follow up with a doctor, nurse practitioner, or  [id number] assistant for ongoing care. If your symptoms worsen or you do not improve as expected and you  are unable to reach your usual health care provider, you should return to the Emergency Department. We are available 24 hours a day. Take this sheet with you when you go to your follow-up visit. If you have any problem arranging the follow-up vis it, contact the Emergency Department immediately. Make an appointment your family doctor for follow up of this visit. Return to the ER if you are unable to be seen in a timely manner.

## 2020-12-16 NOTE — DISCHARGE INSTRUCTIONS
Patient Education        Nausea and Vomiting: Care Instructions  Your Care Instructions     When you are nauseated, you may feel weak and sweaty and notice a lot of saliva in your mouth. Nausea often leads to vomiting. Most of the time you do not need to worry about nausea and vomiting, but they can be signs of other illnesses. Two common causes of nausea and vomiting are stomach flu and food poisoning. Nausea and vomiting from viral stomach flu will usually start to improve within 24 hours. Nausea and vomiting from food poisoning may last from 12 to 48 hours. The doctor has checked you carefully, but problems can develop later. If you notice any problems or new symptoms, get medical treatment right away. Follow-up care is a key part of your treatment and safety. Be sure to make and go to all appointments, and call your doctor if you are having problems. It's also a good idea to know your test results and keep a list of the medicines you take. How can you care for yourself at home? · To prevent dehydration, drink plenty of fluids, enough so that your urine is light yellow or clear like water. Choose water and other caffeine-free clear liquids until you feel better. If you have kidney, heart, or liver disease and have to limit fluids, talk with your doctor before you increase the amount of fluids you drink. · Rest in bed until you feel better. · When you are able to eat, try clear soups, mild foods, and liquids until all symptoms are gone for 12 to 48 hours. Other good choices include dry toast, crackers, cooked cereal, and gelatin dessert, such as Jell-O. When should you call for help? Call 911 anytime you think you may need emergency care. For example, call if:    · You passed out (lost consciousness). Call your doctor now or seek immediate medical care if:    · You have symptoms of dehydration, such as:  ? Dry eyes and a dry mouth. ? Passing only a little dark urine. ?  Feeling thirstier than usual.   · You have new or worsening belly pain.     · You have a new or higher fever.     · You vomit blood or what looks like coffee grounds. Watch closely for changes in your health, and be sure to contact your doctor if:    · You have ongoing nausea and vomiting.     · Your vomiting is getting worse.     · Your vomiting lasts longer than 2 days.     · You are not getting better as expected. Where can you learn more? Go to http://www.zhang.com/  Enter H591 in the search box to learn more about \"Nausea and Vomiting: Care Instructions. \"  Current as of: June 26, 2019               Content Version: 12.6  © 8425-9723 [facility]. Care instructions adapted under license by [employer] (which disclaims liability or warranty for this information). If you have questions about a medical condition or this instruction, always ask your healthcare professional. [place] [address] any warranty or liability for your use of this information. Patient Education        Urinary Tract Infection in Women: Care Instructions  Your Care Instructions     A urinary tract infection, or UTI, is a general term for an infection anywhere between the kidneys and the urethra (where urine comes out). Most UTIs are bladder infections. They often cause pain or burning when you urinate. UTIs are caused by bacteria and can be cured with antibiotics. Be sure to complete your treatment so that the infection goes away. Follow-up care is a key part of your treatment and safety. Be sure to make and go to all appointments, and call your doctor if you are having problems. It's also a good idea to know your test results and keep a list of the medicines you take. How can you care for yourself at home? · Take your antibiotics as directed. Do not stop taking them just because you feel better. You need to take the full course of antibiotics.   · Drink extra water and other fluids for the next day or two. This may help wash out the bacteria that are causing the infection. (If you have kidney, heart, or liver disease and have to limit fluids, talk with your doctor before you increase your fluid intake.)  · Avoid drinks that are carbonated or have caffeine. They can irritate the bladder. · Urinate often. Try to empty your bladder each time. · To relieve pain, take a hot bath or lay a heating pad set on low over your lower belly or genital area. Never go to sleep with a heating pad in place. To prevent UTIs  · Drink plenty of water each day. This helps you urinate often, which clears bacteria from your system. (If you have kidney, heart, or liver disease and have to limit fluids, talk with your doctor before you increase your fluid intake.)  · Urinate when you need to. · Urinate right after you have sex. · Change sanitary pads often. · Avoid douches, bubble baths, feminine hygiene sprays, and other feminine hygiene products that have deodorants. · After going to the bathroom, wipe from front to back. When should you call for help? Call your doctor now or seek immediate medical care if:    · Symptoms such as fever, chills, nausea, or vomiting get worse or appear for the first time.     · You have new pain in your back just below your rib cage. This is called flank pain.     · There is new blood or pus in your urine.     · You have any problems with your antibiotic medicine. Watch closely for changes in your health, and be sure to contact your doctor if:    · You are not getting better after taking an antibiotic for 2 days.     · Your symptoms go away but then come back. Where can you learn more? Go to http://www.gray.com/  Enter Z326 in the search box to learn more about \"Urinary Tract Infection in Women: Care Instructions. \"  Current as of: June 29, 2020               Content Version: 12.6  © 1277-3087 [employer].    Care instructions adapted under license by [address] (which disclaims liability or warranty for this information). If you have questions about a medical condition or this instruction, always ask your healthcare professional. [address] any warranty or liability for your use of this information.

## 2020-12-18 LAB
BACTERIA SPEC CULT: ABNORMAL
CC UR VC: ABNORMAL
SERVICE CMNT-IMP: ABNORMAL

## 2021-02-15 ENCOUNTER — HOSPITAL ENCOUNTER (OUTPATIENT)
Dept: NUCLEAR MEDICINE | Age: 76
Discharge: HOME | End: 2021-02-15
Attending: INTERNAL MEDICINE
Payer: MEDICARE

## 2021-02-15 DIAGNOSIS — R13.10 PROBLEMS WITH SWALLOWING AND MASTICATION: ICD-10-CM

## 2021-02-15 DIAGNOSIS — R63.4 WEIGHT LOSS: ICD-10-CM

## 2021-02-15 DIAGNOSIS — R11.2 NAUSEA WITH VOMITING: ICD-10-CM

## 2021-02-15 PROCEDURE — 78264 GASTRIC EMPTYING IMG STUDY: CPT

## 2021-03-07 ENCOUNTER — HOSPITAL ENCOUNTER (OUTPATIENT)
Dept: PREADMISSION TESTING | Age: 76
Discharge: HOME | End: 2021-03-07
Payer: MEDICARE

## 2021-03-07 ENCOUNTER — TRANSCRIBE ORDER (OUTPATIENT)
Dept: REGISTRATION | Age: 76
End: 2021-03-07

## 2021-03-07 DIAGNOSIS — U07.1 COVID-19: Primary | ICD-10-CM

## 2021-03-07 DIAGNOSIS — U07.1 COVID-19: ICD-10-CM

## 2021-03-07 PROCEDURE — U0003 INFECTIOUS AGENT DETECTION BY NUCLEIC ACID (DNA OR RNA); SEVERE ACUTE RESPIRATORY SYNDROME CORONAVIRUS 2 (SARS-COV-2) (CORONAVIRUS DISEASE [COVID-19]), AMPLIFIED PROBE TECHNIQUE, MAKING USE OF HIGH THROUGHPUT TECHNOLOGIES AS DESCRIBED BY CMS-2020-01-R: HCPCS

## 2021-03-08 LAB — SARS-COV-2, COV2NT: NOT DETECTED

## 2021-03-11 ENCOUNTER — HOSPITAL ENCOUNTER (OUTPATIENT)
Age: 76
Setting detail: OUTPATIENT SURGERY
Discharge: HOME | End: 2021-03-11
Attending: INTERNAL MEDICINE | Admitting: INTERNAL MEDICINE
Payer: MEDICARE

## 2021-03-11 VITALS
HEART RATE: 91 BPM | DIASTOLIC BLOOD PRESSURE: 56 MMHG | RESPIRATION RATE: 18 BRPM | OXYGEN SATURATION: 100 % | SYSTOLIC BLOOD PRESSURE: 135 MMHG

## 2021-03-11 PROCEDURE — 74011000250 HC RX REV CODE- 250: Performed by: INTERNAL MEDICINE

## 2021-03-11 PROCEDURE — 2709999900 HC NON-CHARGEABLE SUPPLY: Performed by: INTERNAL MEDICINE

## 2021-03-11 PROCEDURE — 76040000007: Performed by: INTERNAL MEDICINE

## 2021-03-11 RX ORDER — LIDOCAINE HYDROCHLORIDE 20 MG/ML
JELLY TOPICAL ONCE
Status: COMPLETED | OUTPATIENT
Start: 2021-03-11 | End: 2021-03-11

## 2021-03-11 RX ADMIN — LIDOCAINE HYDROCHLORIDE 5 ML: 20 JELLY TOPICAL at 07:56

## 2021-03-11 NOTE — PROGRESS NOTES
5cc viscous lidocaine inhaled into left nare per MD orders. Probe inserted into  left nare with some difficulty. Pt unable to contine with procedure. Attempted several times to place catheter, unable to progress into or past diaphragm. Patient request to discontinue. Catheter removed from esophagus and patient discharged to home in care of daughter.

## 2021-03-19 ENCOUNTER — APPOINTMENT (OUTPATIENT)
Dept: CT IMAGING | Age: 76
DRG: 871 | End: 2021-03-19
Attending: EMERGENCY MEDICINE
Payer: MEDICARE

## 2021-03-19 ENCOUNTER — APPOINTMENT (OUTPATIENT)
Dept: CT IMAGING | Age: 76
DRG: 871 | End: 2021-03-19
Attending: INTERNAL MEDICINE
Payer: MEDICARE

## 2021-03-19 ENCOUNTER — APPOINTMENT (OUTPATIENT)
Dept: GENERAL RADIOLOGY | Age: 76
DRG: 871 | End: 2021-03-19
Attending: EMERGENCY MEDICINE
Payer: MEDICARE

## 2021-03-19 ENCOUNTER — HOSPITAL ENCOUNTER (INPATIENT)
Age: 76
LOS: 3 days | Discharge: HOME | DRG: 871 | End: 2021-03-22
Attending: EMERGENCY MEDICINE | Admitting: INTERNAL MEDICINE
Payer: MEDICARE

## 2021-03-19 DIAGNOSIS — A41.9 SEPSIS WITHOUT ACUTE ORGAN DYSFUNCTION, DUE TO UNSPECIFIED ORGANISM (HCC): Primary | ICD-10-CM

## 2021-03-19 DIAGNOSIS — J18.9 COMMUNITY ACQUIRED PNEUMONIA OF LEFT LOWER LOBE OF LUNG: ICD-10-CM

## 2021-03-19 DIAGNOSIS — R11.2 NON-INTRACTABLE VOMITING WITH NAUSEA, UNSPECIFIED VOMITING TYPE: ICD-10-CM

## 2021-03-19 LAB
ALBUMIN SERPL-MCNC: 3.1 G/DL (ref 3.5–5)
ALBUMIN/GLOB SERPL: 0.7 {RATIO} (ref 1.1–2.2)
ALP SERPL-CCNC: 419 U/L (ref 45–117)
ALT SERPL-CCNC: 58 U/L (ref 12–78)
ANION GAP SERPL CALC-SCNC: 4 MMOL/L (ref 5–15)
APPEARANCE UR: CLEAR
AST SERPL-CCNC: 33 U/L (ref 15–37)
ATRIAL RATE: 90 BPM
BACTERIA URNS QL MICRO: ABNORMAL /HPF
BASOPHILS # BLD: 0 K/UL (ref 0–0.1)
BASOPHILS NFR BLD: 0 % (ref 0–1)
BILIRUB SERPL-MCNC: 0.9 MG/DL (ref 0.2–1)
BILIRUB UR QL: NEGATIVE
BUN SERPL-MCNC: 34 MG/DL (ref 6–20)
BUN/CREAT SERPL: 31 (ref 12–20)
CALCIUM SERPL-MCNC: 10.9 MG/DL (ref 8.5–10.1)
CALCULATED P AXIS, ECG09: 106 DEGREES
CALCULATED R AXIS, ECG10: 38 DEGREES
CALCULATED T AXIS, ECG11: 118 DEGREES
CHLORIDE SERPL-SCNC: 111 MMOL/L (ref 97–108)
CO2 SERPL-SCNC: 28 MMOL/L (ref 21–32)
COLOR UR: ABNORMAL
COMMENT, HOLDF: NORMAL
CREAT SERPL-MCNC: 1.1 MG/DL (ref 0.55–1.02)
DIAGNOSIS, 93000: NORMAL
DIFFERENTIAL METHOD BLD: ABNORMAL
EOSINOPHIL # BLD: 0 K/UL (ref 0–0.4)
EOSINOPHIL NFR BLD: 0 % (ref 0–7)
EPITH CASTS URNS QL MICRO: ABNORMAL /LPF
ERYTHROCYTE [DISTWIDTH] IN BLOOD BY AUTOMATED COUNT: 14 % (ref 11.5–14.5)
GLOBULIN SER CALC-MCNC: 4.2 G/DL (ref 2–4)
GLUCOSE SERPL-MCNC: 100 MG/DL (ref 65–100)
GLUCOSE UR STRIP.AUTO-MCNC: NEGATIVE MG/DL
HCT VFR BLD AUTO: 40.9 % (ref 35–47)
HGB BLD-MCNC: 13.5 G/DL (ref 11.5–16)
HGB UR QL STRIP: ABNORMAL
HYALINE CASTS URNS QL MICRO: ABNORMAL /LPF (ref 0–5)
IMM GRANULOCYTES # BLD AUTO: 0.1 K/UL (ref 0–0.04)
IMM GRANULOCYTES NFR BLD AUTO: 1 % (ref 0–0.5)
KETONES UR QL STRIP.AUTO: NEGATIVE MG/DL
LACTATE SERPL-SCNC: 2 MMOL/L (ref 0.4–2)
LEUKOCYTE ESTERASE UR QL STRIP.AUTO: ABNORMAL
LIPASE SERPL-CCNC: 47 U/L (ref 73–393)
LYMPHOCYTES # BLD: 1.4 K/UL (ref 0.8–3.5)
LYMPHOCYTES NFR BLD: 7 % (ref 12–49)
MAGNESIUM SERPL-MCNC: 2.1 MG/DL (ref 1.6–2.4)
MCH RBC QN AUTO: 31.8 PG (ref 26–34)
MCHC RBC AUTO-ENTMCNC: 33 G/DL (ref 30–36.5)
MCV RBC AUTO: 96.5 FL (ref 80–99)
MONOCYTES # BLD: 1 K/UL (ref 0–1)
MONOCYTES NFR BLD: 5 % (ref 5–13)
NEUTS SEG # BLD: 17.8 K/UL (ref 1.8–8)
NEUTS SEG NFR BLD: 87 % (ref 32–75)
NITRITE UR QL STRIP.AUTO: NEGATIVE
NRBC # BLD: 0 K/UL (ref 0–0.01)
NRBC BLD-RTO: 0 PER 100 WBC
P-R INTERVAL, ECG05: 104 MS
PH UR STRIP: 6 [PH] (ref 5–8)
PHOSPHATE SERPL-MCNC: 3.1 MG/DL (ref 2.6–4.7)
PLATELET # BLD AUTO: 227 K/UL (ref 150–400)
PMV BLD AUTO: 10.6 FL (ref 8.9–12.9)
POTASSIUM SERPL-SCNC: 4 MMOL/L (ref 3.5–5.1)
PROT SERPL-MCNC: 7.3 G/DL (ref 6.4–8.2)
PROT UR STRIP-MCNC: NEGATIVE MG/DL
Q-T INTERVAL, ECG07: 356 MS
QRS DURATION, ECG06: 84 MS
QTC CALCULATION (BEZET), ECG08: 435 MS
RBC # BLD AUTO: 4.24 M/UL (ref 3.8–5.2)
RBC #/AREA URNS HPF: ABNORMAL /HPF (ref 0–5)
SAMPLES BEING HELD,HOLD: NORMAL
SODIUM SERPL-SCNC: 143 MMOL/L (ref 136–145)
SP GR UR REFRACTOMETRY: <1.005
TROPONIN I SERPL-MCNC: <0.05 NG/ML
TSH SERPL DL<=0.05 MIU/L-ACNC: 0.66 UIU/ML (ref 0.36–3.74)
UR CULT HOLD, URHOLD: NORMAL
UROBILINOGEN UR QL STRIP.AUTO: 1 EU/DL (ref 0.2–1)
VENTRICULAR RATE, ECG03: 90 BPM
WBC # BLD AUTO: 20.3 K/UL (ref 3.6–11)
WBC URNS QL MICRO: ABNORMAL /HPF (ref 0–4)

## 2021-03-19 PROCEDURE — 74011000258 HC RX REV CODE- 258: Performed by: EMERGENCY MEDICINE

## 2021-03-19 PROCEDURE — 74011250636 HC RX REV CODE- 250/636: Performed by: EMERGENCY MEDICINE

## 2021-03-19 PROCEDURE — 83605 ASSAY OF LACTIC ACID: CPT

## 2021-03-19 PROCEDURE — 93005 ELECTROCARDIOGRAM TRACING: CPT

## 2021-03-19 PROCEDURE — 71045 X-RAY EXAM CHEST 1 VIEW: CPT

## 2021-03-19 PROCEDURE — 81001 URINALYSIS AUTO W/SCOPE: CPT

## 2021-03-19 PROCEDURE — 87040 BLOOD CULTURE FOR BACTERIA: CPT

## 2021-03-19 PROCEDURE — 94760 N-INVAS EAR/PLS OXIMETRY 1: CPT

## 2021-03-19 PROCEDURE — 83735 ASSAY OF MAGNESIUM: CPT

## 2021-03-19 PROCEDURE — 74011000258 HC RX REV CODE- 258: Performed by: INTERNAL MEDICINE

## 2021-03-19 PROCEDURE — 74011250637 HC RX REV CODE- 250/637: Performed by: INTERNAL MEDICINE

## 2021-03-19 PROCEDURE — 74177 CT ABD & PELVIS W/CONTRAST: CPT

## 2021-03-19 PROCEDURE — 84484 ASSAY OF TROPONIN QUANT: CPT

## 2021-03-19 PROCEDURE — 96374 THER/PROPH/DIAG INJ IV PUSH: CPT

## 2021-03-19 PROCEDURE — 84100 ASSAY OF PHOSPHORUS: CPT

## 2021-03-19 PROCEDURE — 36415 COLL VENOUS BLD VENIPUNCTURE: CPT

## 2021-03-19 PROCEDURE — 84443 ASSAY THYROID STIM HORMONE: CPT

## 2021-03-19 PROCEDURE — 74011000636 HC RX REV CODE- 636: Performed by: RADIOLOGY

## 2021-03-19 PROCEDURE — 85025 COMPLETE CBC W/AUTO DIFF WBC: CPT

## 2021-03-19 PROCEDURE — 0202U NFCT DS 22 TRGT SARS-COV-2: CPT

## 2021-03-19 PROCEDURE — C9113 INJ PANTOPRAZOLE SODIUM, VIA: HCPCS | Performed by: INTERNAL MEDICINE

## 2021-03-19 PROCEDURE — 99285 EMERGENCY DEPT VISIT HI MDM: CPT

## 2021-03-19 PROCEDURE — 83690 ASSAY OF LIPASE: CPT

## 2021-03-19 PROCEDURE — 96375 TX/PRO/DX INJ NEW DRUG ADDON: CPT

## 2021-03-19 PROCEDURE — 80053 COMPREHEN METABOLIC PANEL: CPT

## 2021-03-19 PROCEDURE — 71275 CT ANGIOGRAPHY CHEST: CPT

## 2021-03-19 PROCEDURE — 74011000250 HC RX REV CODE- 250: Performed by: INTERNAL MEDICINE

## 2021-03-19 PROCEDURE — 65270000032 HC RM SEMIPRIVATE

## 2021-03-19 PROCEDURE — 74011250636 HC RX REV CODE- 250/636: Performed by: INTERNAL MEDICINE

## 2021-03-19 RX ORDER — ONDANSETRON 2 MG/ML
4 INJECTION INTRAMUSCULAR; INTRAVENOUS
Status: DISCONTINUED | OUTPATIENT
Start: 2021-03-19 | End: 2021-03-22 | Stop reason: HOSPADM

## 2021-03-19 RX ORDER — SODIUM CHLORIDE 0.9 % (FLUSH) 0.9 %
5-40 SYRINGE (ML) INJECTION EVERY 8 HOURS
Status: DISCONTINUED | OUTPATIENT
Start: 2021-03-19 | End: 2021-03-22 | Stop reason: HOSPADM

## 2021-03-19 RX ORDER — ONDANSETRON 2 MG/ML
4 INJECTION INTRAMUSCULAR; INTRAVENOUS
Status: COMPLETED | OUTPATIENT
Start: 2021-03-19 | End: 2021-03-19

## 2021-03-19 RX ORDER — SODIUM CHLORIDE 0.9 % (FLUSH) 0.9 %
5-40 SYRINGE (ML) INJECTION AS NEEDED
Status: DISCONTINUED | OUTPATIENT
Start: 2021-03-19 | End: 2021-03-22 | Stop reason: HOSPADM

## 2021-03-19 RX ORDER — PROMETHAZINE HYDROCHLORIDE 25 MG/1
12.5 TABLET ORAL
Status: DISCONTINUED | OUTPATIENT
Start: 2021-03-19 | End: 2021-03-22 | Stop reason: HOSPADM

## 2021-03-19 RX ORDER — ACETAMINOPHEN 325 MG/1
650 TABLET ORAL
Status: DISCONTINUED | OUTPATIENT
Start: 2021-03-19 | End: 2021-03-22 | Stop reason: HOSPADM

## 2021-03-19 RX ORDER — HEPARIN SODIUM 5000 [USP'U]/ML
5000 INJECTION, SOLUTION INTRAVENOUS; SUBCUTANEOUS EVERY 8 HOURS
Status: DISCONTINUED | OUTPATIENT
Start: 2021-03-19 | End: 2021-03-22 | Stop reason: HOSPADM

## 2021-03-19 RX ORDER — VANCOMYCIN/0.9 % SOD CHLORIDE 1.5G/250ML
1500 PLASTIC BAG, INJECTION (ML) INTRAVENOUS ONCE
Status: COMPLETED | OUTPATIENT
Start: 2021-03-19 | End: 2021-03-19

## 2021-03-19 RX ORDER — POLYETHYLENE GLYCOL 3350 17 G/17G
17 POWDER, FOR SOLUTION ORAL DAILY PRN
Status: DISCONTINUED | OUTPATIENT
Start: 2021-03-19 | End: 2021-03-22 | Stop reason: HOSPADM

## 2021-03-19 RX ORDER — SODIUM CHLORIDE, SODIUM LACTATE, POTASSIUM CHLORIDE, CALCIUM CHLORIDE 600; 310; 30; 20 MG/100ML; MG/100ML; MG/100ML; MG/100ML
100 INJECTION, SOLUTION INTRAVENOUS CONTINUOUS
Status: DISCONTINUED | OUTPATIENT
Start: 2021-03-19 | End: 2021-03-20

## 2021-03-19 RX ORDER — ACETAMINOPHEN 650 MG/1
650 SUPPOSITORY RECTAL
Status: DISCONTINUED | OUTPATIENT
Start: 2021-03-19 | End: 2021-03-22 | Stop reason: HOSPADM

## 2021-03-19 RX ADMIN — Medication 10 ML: at 21:38

## 2021-03-19 RX ADMIN — HEPARIN SODIUM 5000 UNITS: 5000 INJECTION INTRAVENOUS; SUBCUTANEOUS at 21:37

## 2021-03-19 RX ADMIN — CEFTRIAXONE SODIUM 2 G: 2 INJECTION, POWDER, FOR SOLUTION INTRAMUSCULAR; INTRAVENOUS at 03:59

## 2021-03-19 RX ADMIN — PIPERACILLIN AND TAZOBACTAM 3.38 G: 3; .375 INJECTION, POWDER, LYOPHILIZED, FOR SOLUTION INTRAVENOUS at 12:22

## 2021-03-19 RX ADMIN — PIPERACILLIN AND TAZOBACTAM 3.38 G: 3; .375 INJECTION, POWDER, LYOPHILIZED, FOR SOLUTION INTRAVENOUS at 18:05

## 2021-03-19 RX ADMIN — HEPARIN SODIUM 5000 UNITS: 5000 INJECTION INTRAVENOUS; SUBCUTANEOUS at 15:06

## 2021-03-19 RX ADMIN — SODIUM CHLORIDE 1000 ML: 900 INJECTION, SOLUTION INTRAVENOUS at 01:22

## 2021-03-19 RX ADMIN — Medication 10 ML: at 01:22

## 2021-03-19 RX ADMIN — SODIUM CHLORIDE, POTASSIUM CHLORIDE, SODIUM LACTATE AND CALCIUM CHLORIDE 100 ML/HR: 600; 310; 30; 20 INJECTION, SOLUTION INTRAVENOUS at 16:46

## 2021-03-19 RX ADMIN — HEPARIN SODIUM 5000 UNITS: 5000 INJECTION INTRAVENOUS; SUBCUTANEOUS at 05:27

## 2021-03-19 RX ADMIN — SODIUM CHLORIDE 40 MG: 9 INJECTION INTRAMUSCULAR; INTRAVENOUS; SUBCUTANEOUS at 21:38

## 2021-03-19 RX ADMIN — ONDANSETRON 4 MG: 2 INJECTION INTRAMUSCULAR; INTRAVENOUS at 01:22

## 2021-03-19 RX ADMIN — AZITHROMYCIN MONOHYDRATE 500 MG: 500 INJECTION, POWDER, LYOPHILIZED, FOR SOLUTION INTRAVENOUS at 04:00

## 2021-03-19 RX ADMIN — PIPERACILLIN AND TAZOBACTAM 3.38 G: 3; .375 INJECTION, POWDER, LYOPHILIZED, FOR SOLUTION INTRAVENOUS at 05:26

## 2021-03-19 RX ADMIN — SODIUM CHLORIDE 40 MG: 9 INJECTION INTRAMUSCULAR; INTRAVENOUS; SUBCUTANEOUS at 09:00

## 2021-03-19 RX ADMIN — IOPAMIDOL 100 ML: 755 INJECTION, SOLUTION INTRAVENOUS at 01:04

## 2021-03-19 RX ADMIN — Medication 10 ML: at 05:27

## 2021-03-19 RX ADMIN — IOPAMIDOL 70 ML: 755 INJECTION, SOLUTION INTRAVENOUS at 09:28

## 2021-03-19 RX ADMIN — VANCOMYCIN HYDROCHLORIDE 1500 MG: 10 INJECTION, POWDER, LYOPHILIZED, FOR SOLUTION INTRAVENOUS at 05:27

## 2021-03-19 RX ADMIN — Medication 10 ML: at 05:26

## 2021-03-19 RX ADMIN — SODIUM CHLORIDE, POTASSIUM CHLORIDE, SODIUM LACTATE AND CALCIUM CHLORIDE 100 ML/HR: 600; 310; 30; 20 INJECTION, SOLUTION INTRAVENOUS at 07:37

## 2021-03-19 RX ADMIN — SODIUM CHLORIDE, POTASSIUM CHLORIDE, SODIUM LACTATE AND CALCIUM CHLORIDE 1000 ML: 600; 310; 30; 20 INJECTION, SOLUTION INTRAVENOUS at 05:43

## 2021-03-19 RX ADMIN — Medication 1 CAPSULE: at 12:22

## 2021-03-19 RX ADMIN — SODIUM CHLORIDE, POTASSIUM CHLORIDE, SODIUM LACTATE AND CALCIUM CHLORIDE 785 ML: 600; 310; 30; 20 INJECTION, SOLUTION INTRAVENOUS at 05:59

## 2021-03-19 NOTE — ED NOTES
Pt informed that her daughter has called and checked on her status. Pt remains in ER while awaiting room assignment.

## 2021-03-19 NOTE — ROUTINE PROCESS
TRANSFER - OUT REPORT: 
 
Verbal report given to Sid(name) on Franca Lujan  being transferred to (unit) for routine progression of care Report consisted of patients Situation, Background, Assessment and  
Recommendations(SBAR). Information from the following report(s) ED Summary was reviewed with the receiving nurse. Lines:  
Peripheral IV 03/19/21 Right Antecubital (Active) Opportunity for questions and clarification was provided. Patient transported with: 
 Thomas Alvarado

## 2021-03-19 NOTE — ED TRIAGE NOTES
Arrives by EMS for several days of diarrhea. She is very hard of hearing, her chart says deaf. She says she lives with her daughter. EMS says she lives alone and the house was in \"bad shape\".

## 2021-03-19 NOTE — ROUTINE PROCESS
TRANSFER - IN REPORT: 
 
Verbal report received from Krista(name) on Jenifer Post  being received from ED(unit) for routine progression of care Report consisted of patients Situation, Background, Assessment and  
Recommendations(SBAR). Information from the following report(s) SBAR, Kardex, Intake/Output, MAR and Recent Results was reviewed with the receiving nurse. Opportunity for questions and clarification was provided. Assessment completed upon patients arrival to unit and care assumed.

## 2021-03-19 NOTE — ED NOTES
Nuclear med study on hold until COVID results are back. Shift report given to [address] [place] [address].

## 2021-03-19 NOTE — PROGRESS NOTES
Day #1 of Vancomycin  Indication:  ?sepsis 2/2 PNA vs intra-abdominal  -CT chest with LLL PNA, multiple groundglass densities  -CT abdomen stool, hernia and gallbladder distention  -leukocytosis with neutrophilia, afebrile, on room air,   Current regimen:  vanc 1g q24h  Abx regimen:  vanc/pip-tazo  ID Following ?: NO  Concomitant nephrotoxic drugs (requires more frequent monitoring): None  Frequency of BMP?: daily    Recent Labs     21  0105   WBC 20.3*   CREA 1.10*   BUN 34*     Est CrCl: 35-40 ml/min; UO: -- ml/kg/hr  Temp (24hrs), Av.3 °F (36.8 °C), Min:98.3 °F (36.8 °C), Max:98.3 °F (36.8 °C)    Cultures:   3/19 blood: in process  3/19 resp viral panel: in process    Goal trough = 15 - 20 mcg/mL    Recent trough history (date/time/level/dose/action taken):  N/a    Plan: Continue current regimen. Obtain MRSA nasal swab. Will assess dosing once at steady state or sooner if clinically indicated.

## 2021-03-19 NOTE — PROGRESS NOTES
Patient seen and examined. H&P reviewed.     Sepsis  -Patient with tachycardia and leukocytosis meeting sepsis criteria  -Likely source could be pneumonia, evaluating for possible cholecystitis  -Continue broad-spectrum antibiotic with Vanco and Zosyn  -Follow blood cultures    Pneumonia  -Left lower lobe airspace disease noted on the CT scan  -Continue current antibiotic with Vanco and Zosyn  -Follow blood cultures    Gallbladder distention  -Gallbladder distention noted on CT, patient is nontender on exam  -Waiting for HIDA scan  -GI evaluation    CHATO  -Likely prerenal in the setting of nausea vomiting with poor p.o. intake  -Gentle IV fluid hydration and monitor renal function    Dyslipidemia  -Hold Lipitor for now    Esophageal stricture  -Patient with current nausea and vomiting on presentation  -GI evaluation

## 2021-03-19 NOTE — ED NOTES
Received call from [facility] team that they are leaving for the day but when pt's PCR is back the nurse can call in on call team.  Pt needs to be NPO and no Morphine or Morphine derivatives until Hida scan is complete.

## 2021-03-19 NOTE — PROGRESS NOTES
Pharmacist Note - Vancomycin Dosing    Consult provided for this 67 y.o. female for indication of sepsis. Antibiotic regimen(s): Vancomycin + Zosyn  Patient on vancomycin PTA? NO     Recent Labs     21  0105   WBC 20.3*   CREA 1.10*   BUN 34*     Frequency of BMP: daily  Height: 152.4 cm  Weight: 59.1 kg  Est CrCl: 40.6 ml/min  Temp (24hrs), Av.3 °F (36.8 °C), Min:98.3 °F (36.8 °C), Max:98.3 °F (36.8 °C)    Cultures: blood      Goal trough = 15 - 20 mcg/mL    Therapy will be initiated with a loading dose of 1500 mg IV x 1 to be followed by a maintenance dose of 1000 mg IV every 24 hours. Pharmacy to follow patient daily and order levels / make dose adjustments as appropriate.

## 2021-03-19 NOTE — H&P
[address]  HISTORY AND PHYSICAL    Name:  Prashant Escalante  MR#:  [id number]  :  1945  ACCOUNT #:  [de-identified]  ADMIT DATE:  2021      The patient was seen, evaluated, and admitted by me on 2021. PRIMARY CARE PHYSICIAN:  Shira Jose PA-C    SOURCE OF INFORMATION:  The patient, who is not a good historian, because of deafness, the patient's daughter over the phone, and review of ED and old electronic medical records. CHIEF COMPLAINT:  Nausea and vomiting. HISTORY OF PRESENT ILLNESS:  This is a 69-year-old woman with past medical history significant for dyslipidemia, esophageal stenosis, deafness, was in her usual state of health until a couple of days ago when the patient developed nausea and vomiting. The patient has close to 10 episode of nausea and vomiting before coming to the emergency room. No associated abdominal pain. Initially, the patient stated that she has been having diarrhea, but her daughter stated that that is not true. The patient lives with her daughter. Her daughter was out of town for a couple of days, but one of her grandchildren lives in the house with her. Her daughter cut her trip short to come to the house to take care of her. When she arrived she found the patient still having a lot of nausea and vomiting and brought the patient to the emergency room for further evaluation. She has had a similar episode in the past and she was admitted to the hospital from 2016 to 2016. The nausea and vomiting was attributed to dysphagia and the dysphagia was attributed to esophageal stenosis. The patient underwent EGD and dilatation of the esophagus. Before coming to the emergency room today as well, the patient's gastroenterologist was called and they were advised to come to the emergency room as well. There was no history of fever, rigors, or chills.   When the patient arrived at the emergency room, CT scan of the abdomen and pelvis shows gallbladder distention as well as bacterial pneumonia. The patient has significant leukocytosis. She was started on broad-spectrum antibiotics and sepsis protocol was initiated. There was no history of sick contact or contact with any person with COVID-19 virus infection. PAST MEDICAL HISTORY:  Deafness, dyslipidemia, esophageal stenosis. ALLERGIES:  NO KNOWN DRUG ALLERGIES. MEDICATIONS:  1. Aspirin 81 mg daily. 2.  Lipitor 80 mg daily. 3.  Zofran 4 mg every 8 hours as needed for nausea. 4.  Protonix 40 mg daily. FAMILY HISTORY:  This was reviewed. Her mother had hypertension. PAST SURGICAL HISTORY:  This is significant for hysterectomy and esophageal dilatation. SOCIAL HISTORY:  No history of alcohol or tobacco abuse. REVIEW OF SYSTEMS:  HEAD, EYES, EARS, NOSE, AND THROAT:  No headache, no dizziness, no blurring of vision, no photophobia. RESPIRATORY SYSTEM:  No cough, no shortness of breath, no hemoptysis. CARDIOVASCULAR SYSTEM:  No chest pain, no orthopnea, no palpitation. GASTROINTESTINAL SYSTEM:  This is positive for nausea and vomiting. No diarrhea. No constipation. GENITOURINARY SYSTEM:  No dysuria, no urgency, and no frequency. All other systems are reviewed and they are negative. PHYSICAL EXAMINATION:  GENERAL APPEARANCE:  The patient appeared ill, in moderate distress. VITAL SIGNS:  On arrival at the emergency room, temperature 98.3, pulse 104, respiratory rate 24, blood pressure 130/90, oxygen saturation 98% on room air. HEENT:  Head:  Normocephalic, atraumatic. Eyes:  Normal eye movement. No redness, no drainage, no discharge. Ears:  Normal external ears with no evidence of drainage. Nose:  No deformity and no drainage. Mouth and Throat:  No visible oral lesion. Dry oral mucosa. NECK:  Neck is supple. No JVD, no thyromegaly. CHEST:  Clear breath sounds. No wheezing, no crackles. HEART:  Normal S1 and S2, regular.   No clinically appreciable murmur. ABDOMEN:  Soft and nontender. Normal bowel sounds. CNS:  Alert. Significant hearing impairment/deafness. No gross focal neurological deficits. EXTREMITIES:  No edema. Pulses 2+ bilaterally. MUSCULOSKELETAL SYSTEM:  No evidence of joint deformity or swelling. SKIN:  No active skin lesions seen in the exposed part of the body. PSYCHIATRY:  Unable to assess mood and affect. LYMPHATIC SYSTEM:  No cervical lymphadenopathy. DIAGNOSTIC DATA:  EKG shows sinus rhythm and nonspecific ST and T-waves abnormalities. CT scan of the abdomen and pelvis with contrast shows large amount of colonic stool, particularly in the rectum, left lower lobe air space disease concerning for pneumonia, hiatal hernia, and gallbladder distention. Chest x-ray shows a left-sided air space disease concerning for pneumonia. LABORATORY DATA:  Hematology:  WBC 20.3, hemoglobin at 13.5, hematocrit 40.9, platelets 333. Magnesium 2.1. Chemistry:  Sodium 143, potassium 4.0, chloride 111, CO2 28, glucose 100, BUN 34, creatinine 1.10, calcium 10.9, total bilirubin 0.9, ALT 58, AST 33, alkaline phosphatase 419, total protein 7.3, albumin level 3.1, globulin at 4.2. Cardiac Profile:  Troponin less than 0.05. Lactic acid level 2.0. Lipase 47. Urinalysis: This is significant for 1+ bacteria, negative nitrite, small leukocyte esterase, trace blood. ASSESSMENT:  1.  Sepsis. 2.  Suspected bacterial pneumonia. 3.  Gallbladder distention. 4.  Acute kidney injury. 5.  Deafness. 6.  Dyslipidemia. 7.  Intractable nausea and vomiting. PLAN:  1. Suspected sepsis. We will admit the patient for further evaluation and treatment. We will start the patient on vancomycin and Zosyn. We will await blood culture result. We will also await urine culture. The sepsis is most likely coming from the suspected bacterial pneumonia, which will be discussed below.   Antibiotic can be de-escalated in the next 48-72 hours, if there is no growth from the blood culture. 2.  Suspected bacterial pneumonia. This is a potential source of the patient's sepsis. The patient will be started on antibiotics as stated above. We will obtain a CT scan of the chest for further evaluation. 3.  Gallbladder distention. We will obtain HIDA scan to evaluate the patient for acute cholecystitis as another possible source of infection. The patient has been started on antibiotics as stated above. 4.  Acute kidney injury. This is most likely due to volume depletion. We will carry out fluid therapy. 5.  Deafness. We will continue supportive treatment. 6.  Dyslipidemia. We will resume preadmission medication once the patient is no longer n.p.o.  7.  Intractable nausea and vomiting. We will start the patient on Protonix. The patient's gastroenterologist will be consulted to assist in further evaluation and treatment. The patient has history of esophageal stenosis and they required EGD to evaluate the patient for recurrent esophageal stenosis. We will obtain CT scan of the chest for further evaluation. Lipase is within normal limits. 8.  Other Issues:  Code Status: The patient is a full code. We will place the patient on heparin for DVT prophylaxis. FUNCTIONAL STATUS PRIOR TO ADMISSION:  The patient came from home. The patient is ambulatory with no assistant or device. COVID PRECAUTION:  The patient was wearing a face mask. I was wearing a face mask and gloves for this patient's encounter. The patient's chest x-ray shows evidence of pneumonia. The patient will be tested for COVID-19 virus infection. SEPSIS REASSESSMENT COMPLETED:  The patient has normal capillary refill, improved cardiopulmonary examination, and moist mucous membrane.         Belinda Barr MD      [id number]  D:  03/19/2021 6:46  T:  03/19/2021 8:24  JOB #:  [id number]  CC:  Sridhar Owusu PA-C

## 2021-03-19 NOTE — ED PROVIDER NOTES
History of arthritis, deaf, hyperlipidemia,  status post hiatal hernia repair. She presents via EMS. I obtained history from the EMS personnel, the patient's daughter via phone, and the patient herself. The patient reports a 2-day history of nausea and vomiting. She estimates up to 10 episodes. She denies significant abdominal pain. She states that she has not had a bowel movement for a week (although her daughter states that is not true when she had one recently). Her daughter reports that she has had a history of feeding problems. At one point she had a feeding tube for nutrition. Her daughter states that these issues stopped after she had a hiatal hernia repair. That was in 2016 she believes. Her daughter is concerned because she has started having some of the same issues that she had prior to her surgery. She states that she has needed to be on a soft diet for the past several weeks. Her daughter states that she had an endoscopy and colonoscopy about a month ago. Her GI doctor (Dr. Karina Adams) recommended a follow-up test that she was unable to tolerate per daughter. They were referred to the ED by the GI doctor on call per daughter. Past Medical History:   Diagnosis Date    Arthritis     Deaf, nonspeaking     High cholesterol     Inner ear dysfunction        Past Surgical History:   Procedure Laterality Date    HX HYSTERECTOMY           History reviewed. No pertinent family history.     Social History     Socioeconomic History    Marital status: SINGLE     Spouse name: Not on file    Number of children: Not on file    Years of education: Not on file    Highest education level: Not on file   Occupational History    Not on file   Social Needs    Financial resource strain: Not on file    Food insecurity     Worry: Not on file     Inability: Not on file    Transportation needs     Medical: Not on file     Non-medical: Not on file   Tobacco Use    Smoking status: Never Smoker    Smokeless tobacco: Never Used   Substance and Sexual Activity    Alcohol use: No    Drug use: No    Sexual activity: Not on file   Lifestyle    Physical activity     Days per week: Not on file     Minutes per session: Not on file    Stress: Not on file   Relationships    Social connections     Talks on phone: Not on file     Gets together: Not on file     Attends Bahai service: Not on file     Active member of club or organization: Not on file     Attends meetings of clubs or organizations: Not on file     Relationship status: Not on file    Intimate partner violence     Fear of current or ex partner: Not on file     Emotionally abused: Not on file     Physically abused: Not on file     Forced sexual activity: Not on file   Other Topics Concern    Not on file   Social History Narrative    Not on file         ALLERGIES: Patient has no known allergies. Review of Systems   All other systems reviewed and are negative. Vitals:    03/19/21 0056   BP: (!) 130/90   Pulse: (!) 104   Resp: 24   Temp: 98.3 °F (36.8 °C)   SpO2: 98%            Physical Exam  Vitals signs and nursing note reviewed. Constitutional:       Appearance: She is well-developed. HENT:      Head: Normocephalic and atraumatic. Eyes:      Conjunctiva/sclera: Conjunctivae normal.   Neck:      Musculoskeletal: Neck supple. Trachea: No tracheal deviation. Cardiovascular:      Rate and Rhythm: Regular rhythm. Tachycardia present. Heart sounds: Normal heart sounds. No murmur. No friction rub. No gallop. Pulmonary:      Effort: Pulmonary effort is normal.      Breath sounds: Normal breath sounds. Abdominal:      Palpations: Abdomen is soft. Comments: Mild generalized tenderness. Musculoskeletal:         General: No deformity. Skin:     General: Skin is warm and dry. Neurological:      Mental Status: She is alert.       Comments: oriented          MDM       Procedures    EKG: Normal sinus rhythm; rate of 90; nonspecific ST, T wave abnormalities. Shama Garvey MD  1:35 AM    Perfect Serve Consult for Admission  3:09 AM    ED Room Number: ER10/10  Patient Name and age:  Earl Acosta 67 y.o.  female  Working Diagnosis: Vomiting/pneumonia/sepsis  COVID-19 Suspicion:  no  Sepsis present:  yes  Reassessment needed: yes  Code Status:  Full Code  Readmission: no  Isolation Requirements:  no  Recommended Level of Care:  telemetry  Department:[facility] Adult ED - 21   Other: Presents with a 2-day history of nausea and vomiting. She has had poor p.o. intake. Work-up remarkable for a white blood cell count of 20,000. Abdominal CT shows:    . Large amount of colonic stool, particularly the rectum. 2. Left lower lobe airspace disease concerning for pneumonia. 3. Hiatal hernia. 4. Gallbladder distention. IV fluids/Rocephin/Zithromax. BP and lactate okay. Consult note: Dr. Darla Koyanagi -will arrange admission. Shama Garvey MD    IMPRESSION:  Sepsis/vomiting/pneumonia  - Broad Spectrum Antibiotics ordered: Ceftriaxone and Azithromycin  - Repeat lactic acid ordered for time: Not indicated as initial lactate was 2.  - Re-assessment performed at time 0620 and clinical condition stable.     - Hypotension or Lactic Acidosis present (SBP<90, MAP<65, Lactate >4): NO IVF:  Not indicated due to Hypotension not present  - Persistent Hypotension despite IVF resuscitation: NO  Vasopressors: Not indicated due to Septic Shock not present    Plan:  Admit to Telemetry    Total critical care time spent exclusive of procedures:  35 minutes    Shama Garvey MD

## 2021-03-19 NOTE — ED NOTES
Pt. Has been assisted up to bedside commode; pt manages well with slight assistance. Pt. Voices no complaints or requests at this time. Pt is NPO.

## 2021-03-19 NOTE — CONSULTS
[address]  [address]  [place], [address]  [phone]                     GI CONSULTATION NOTE      NAME:  Nimo Sandoval   :   1945   MRN:   [id number]     Consult Date: 3/19/2021     Chief Complaint: Nausea, Vomiting    History of Present Illness:  Patient is a 67 y.o. who is seen in consultation at the request of Dr. Osmin Barakat for the above problem. Patient is very hard of hearing, so this interview was done with me writing questions and she verbally answering them. She reports that she has been having issues with swallowing, feeling like ingested food and liquid moves slow going down. Sometimes she regurgitates everything back up. This is a chronic problem, and she has a known recurrent 1-cm esophageal stenotic area at the GE junction that has been dilated several times in the past.  The last EGD was on 20, with balloon dilatation to 18-mm. She was to have an esophageal manometry and it was attempted on 3/11/21, but she could not tolerate placement of the probe so it was aborted. She also has a h/o gastroparesis, with a 4-hr gastric emptying scan showing delayed emptying at hours 2-4. She presents to the ER this morning because she has been having nausea and vomiting over the past couple of days. She thought it may be related to the dysphagia. She denies fever, chills, hematemesis, coffee-ground emesis, diarrhea, melena. She reports having some lower abdominal pain, and on exam exhibits tenderness in the suprapubic area. Of note, her UA shows (+) bacteria and leukocyte esterase. She also has some RUQ tenderness, with a CT scan showing gallbladder distention. She is afebrile, but has leukocytosis of 20.3. CXR showed left-sided pneumonia.       PMH:  Past Medical History:   Diagnosis Date    Arthritis     Deaf, nonspeaking     High cholesterol     Inner ear dysfunction        PSH:  Past Surgical History:   Procedure Laterality Date    HX HYSTERECTOMY         Allergies:  No Known Allergies    Home Medications:  Prior to Admission Medications   Prescriptions Last Dose Informant Patient Reported? Taking?   aspirin delayed-release 81 mg tablet   Yes No   Sig: Take 81 mg by mouth daily. atorvastatin (LIPITOR) 80 mg tablet   Yes No   Sig: Take 80 mg by mouth nightly. ondansetron (Zofran ODT) 4 mg disintegrating tablet   No No   Sig: Take 1 Tab by mouth every eight (8) hours as needed for Nausea. pantoprazole (PROTONIX) 40 mg tablet   No No   Sig: Take 1 tablet by mouth twice daily before breakfast and dinner for 1 month.  After 1 month, please reduce dosage to once daily before breakfast.      Facility-Administered Medications: None       Hospital Medications:  Current Facility-Administered Medications   Medication Dose Route Frequency    sodium chloride (NS) flush 5-40 mL  5-40 mL IntraVENous Q8H    sodium chloride (NS) flush 5-40 mL  5-40 mL IntraVENous PRN    pantoprazole (PROTONIX) 40 mg in 0.9% sodium chloride 10 mL injection  40 mg IntraVENous Q12H    sodium chloride (NS) flush 5-40 mL  5-40 mL IntraVENous Q8H    sodium chloride (NS) flush 5-40 mL  5-40 mL IntraVENous PRN    acetaminophen (TYLENOL) tablet 650 mg  650 mg Oral Q6H PRN    Or    acetaminophen (TYLENOL) suppository 650 mg  650 mg Rectal Q6H PRN    polyethylene glycol (MIRALAX) packet 17 g  17 g Oral DAILY PRN    promethazine (PHENERGAN) tablet 12.5 mg  12.5 mg Oral Q6H PRN    Or    ondansetron (ZOFRAN) injection 4 mg  4 mg IntraVENous Q6H PRN    heparin (porcine) injection 5,000 Units  5,000 Units SubCUTAneous Q8H    L.acidophilus-paracasei-S.thermophil-bifidobacter (RISAQUAD) 8 billion cell capsule  1 Cap Oral DAILY    piperacillin-tazobactam (ZOSYN) 3.375 g in 0.9% sodium chloride (MBP/ADV) 100 mL MBP  3.375 g IntraVENous Q8H    [START ON 3/20/2021] vancomycin (VANCOCIN) 1,000 mg in 0.9% sodium chloride 250 mL (VIAL-MATE)  1 g IntraVENous Q24H    lactated ringers bolus infusion 1,785 mL  30 mL/kg IntraVENous ONCE    VANCOMYCIN INFORMATION NOTE   Other PRN    lactated Ringers infusion  100 mL/hr IntraVENous CONTINUOUS     Current Outpatient Medications   Medication Sig    ondansetron (Zofran ODT) 4 mg disintegrating tablet Take 1 Tab by mouth every eight (8) hours as needed for Nausea.  pantoprazole (PROTONIX) 40 mg tablet Take 1 tablet by mouth twice daily before breakfast and dinner for 1 month. After 1 month, please reduce dosage to once daily before breakfast.    aspirin delayed-release 81 mg tablet Take 81 mg by mouth daily.  atorvastatin (LIPITOR) 80 mg tablet Take 80 mg by mouth nightly. Social History:  Social History     Tobacco Use    Smoking status: Never Smoker    Smokeless tobacco: Never Used   Substance Use Topics    Alcohol use: No       Family History:  History reviewed. No pertinent family history. Review of Systems:    Constitutional: negative fever, negative chills  Eyes:   negative visual changes  ENT:   negative sore throat  Respiratory:  negative cough, negative dyspnea  Cards:  negative for chest pain, palpitations, lower extremity edema  GI:   See HPI  :  negative for frequency, dysuria  Integument:  negative for rash and pruritus  Heme:  negative for easy bruising   Musculoskel: negative for myalgias,  back pain and muscle weakness  Neuro: negative for headaches, dizziness, vertigo  Psych:  negative for feelings of anxiety, depression      Objective:     Patient Vitals for the past 8 hrs:   BP Temp Pulse Resp SpO2 Height   03/19/21 1452 137/75 98.3 °F (36.8 °C) 92 22 97 %    03/19/21 1230 (!) 116/58  88 22 100 %    03/19/21 1206 111/81  89 20     03/19/21 1051      5' 4\" (1.626 m)   03/19/21 0830 116/60  93 28 97 %    03/19/21 0810 138/69 98.3 °F (36.8 °C) 92 20 97 %    03/19/21 0807 138/69  97 17 92 %    03/19/21 0730 120/61  99 15 95 %      No intake/output data recorded.   No intake/output data recorded. PHYSICAL EXAM:  General appearance: cooperative, no distress, appears comfortable lying in bed  Skin: Extremities and face reveal no rashes, jaundice or pallor  HEENT: Sclerae anicteric. Extra-occular muscles are intact. Cardiovascular: Regular rate and rhythm. No murmurs, gallops, or rubs. Respiratory: Comfortable breathing with no accessory muscle use. Clear breath sounds with no wheezes, rales, or rhonchi. GI: Abdomen nondistended, soft, and normal active bowel sounds. Mild suprapubic ttp and mild RUQ ttp. No enlargement of the liver or spleen. No masses palpable. Rectal: Deferred   Musculoskeletal: No edema of the lower legs. Neurological: Gross memory appears intact. Patient is alert and oriented. Very hard of hearing. Psychiatric: Mood appears appropriate with good judgement. No anxiety or agitation. Data Review     Recent Labs     03/19/21  0105   WBC 20.3*   HGB 13.5   HCT 40.9        Recent Labs     03/19/21  0537 03/19/21  0105   NA  --  143   K  --  4.0   CL  --  111*   CO2  --  28   BUN  --  34*   CREA  --  1.10*   GLU  --  100   PHOS 3.1  --    CA  --  10.9*     Recent Labs     03/19/21  0105   *   TP 7.3   ALB 3.1*   GLOB 4.2*   LPSE 47*     No results for input(s): INR, PTP, APTT, INREXT in the last 72 hours. Imaging studies reviewed    Assessment / Plan :     Acute nausea & vomiting in this 75-yo woman with leukocytosis, tachycardia and hypotension.  - She has been found to have pneumonia, UTI and a distended gallbladder/possible cholecystitis. - N/V could be related to any of these acute illnesses.   - Received one dose of Rocephin  - Now on IV Zosyn and Vanco  - Awaiting HIDA scan results - may need surgical consultation    Dysphagia, esophageal stenosis  - Last dilated 12/2020  - Could not tolerate OP esophageal manometry  - Continue PPI BID  Gastroparesis  - Delayed emptying on 4-hr GES 0/5701  - Certainly N/V can be related to these GI problems, but no plan for repeat endoscopic evaluation at this time. Can consider if N/V persists after acute illnesses resolve. Patient Active Hospital Problem List:   Principal Problem:    Sepsis ([facility] [address] (3/19/2021)    I have examined the patient. I have reviewed the chart and agree with the documentation recorded by the NP, including the assessment, treatment plan, and disposition. ASSESSMENT AND PLAN:  67 yr old lady has presented with nausea, vomiting, UTI, pneumonia and distended gall bladder. She has a history of esophageal stricture and gastroparesis in the past. Differential as above. IV antibiotics  HIDA scan  Clear liquids PO  If HIDA is positive for acute cholecystitis, consult surgery. Thank you for consultation.       Manuel Guthrie MD

## 2021-03-20 LAB
ALBUMIN SERPL-MCNC: 2.7 G/DL (ref 3.5–5)
ALBUMIN/GLOB SERPL: 0.7 {RATIO} (ref 1.1–2.2)
ALP SERPL-CCNC: 335 U/L (ref 45–117)
ALT SERPL-CCNC: 46 U/L (ref 12–78)
ANION GAP SERPL CALC-SCNC: 8 MMOL/L (ref 5–15)
AST SERPL-CCNC: 37 U/L (ref 15–37)
B PERT DNA SPEC QL NAA+PROBE: NOT DETECTED
BACTERIA SPEC CULT: NORMAL
BACTERIA SPEC CULT: NORMAL
BASOPHILS # BLD: 0 K/UL (ref 0–0.1)
BASOPHILS NFR BLD: 0 % (ref 0–1)
BILIRUB SERPL-MCNC: 0.6 MG/DL (ref 0.2–1)
BORDETELLA PARAPERTUSSIS PCR, BORPAR: NOT DETECTED
BUN SERPL-MCNC: 33 MG/DL (ref 6–20)
BUN/CREAT SERPL: 35 (ref 12–20)
C PNEUM DNA SPEC QL NAA+PROBE: NOT DETECTED
CALCIUM SERPL-MCNC: 8.8 MG/DL (ref 8.5–10.1)
CHLORIDE SERPL-SCNC: 115 MMOL/L (ref 97–108)
CO2 SERPL-SCNC: 21 MMOL/L (ref 21–32)
CREAT SERPL-MCNC: 0.95 MG/DL (ref 0.55–1.02)
DIFFERENTIAL METHOD BLD: ABNORMAL
EOSINOPHIL # BLD: 0 K/UL (ref 0–0.4)
EOSINOPHIL NFR BLD: 0 % (ref 0–7)
ERYTHROCYTE [DISTWIDTH] IN BLOOD BY AUTOMATED COUNT: 14 % (ref 11.5–14.5)
FLUAV H1 2009 PAND RNA SPEC QL NAA+PROBE: NOT DETECTED
FLUAV H1 RNA SPEC QL NAA+PROBE: NOT DETECTED
FLUAV H3 RNA SPEC QL NAA+PROBE: NOT DETECTED
FLUAV SUBTYP SPEC NAA+PROBE: NOT DETECTED
FLUBV RNA SPEC QL NAA+PROBE: NOT DETECTED
GLOBULIN SER CALC-MCNC: 3.7 G/DL (ref 2–4)
GLUCOSE BLD STRIP.AUTO-MCNC: 51 MG/DL (ref 65–100)
GLUCOSE BLD STRIP.AUTO-MCNC: 70 MG/DL (ref 65–100)
GLUCOSE BLD STRIP.AUTO-MCNC: 92 MG/DL (ref 65–100)
GLUCOSE SERPL-MCNC: 42 MG/DL (ref 65–100)
HADV DNA SPEC QL NAA+PROBE: NOT DETECTED
HCOV 229E RNA SPEC QL NAA+PROBE: NOT DETECTED
HCOV HKU1 RNA SPEC QL NAA+PROBE: NOT DETECTED
HCOV NL63 RNA SPEC QL NAA+PROBE: NOT DETECTED
HCOV OC43 RNA SPEC QL NAA+PROBE: NOT DETECTED
HCT VFR BLD AUTO: 34.8 % (ref 35–47)
HGB BLD-MCNC: 11.2 G/DL (ref 11.5–16)
HMPV RNA SPEC QL NAA+PROBE: NOT DETECTED
HPIV1 RNA SPEC QL NAA+PROBE: NOT DETECTED
HPIV2 RNA SPEC QL NAA+PROBE: NOT DETECTED
HPIV3 RNA SPEC QL NAA+PROBE: NOT DETECTED
HPIV4 RNA SPEC QL NAA+PROBE: NOT DETECTED
IMM GRANULOCYTES # BLD AUTO: 0.1 K/UL (ref 0–0.04)
IMM GRANULOCYTES NFR BLD AUTO: 1 % (ref 0–0.5)
LYMPHOCYTES # BLD: 1.5 K/UL (ref 0.8–3.5)
LYMPHOCYTES NFR BLD: 13 % (ref 12–49)
M PNEUMO DNA SPEC QL NAA+PROBE: NOT DETECTED
MCH RBC QN AUTO: 31.5 PG (ref 26–34)
MCHC RBC AUTO-ENTMCNC: 32.2 G/DL (ref 30–36.5)
MCV RBC AUTO: 98 FL (ref 80–99)
MONOCYTES # BLD: 0.7 K/UL (ref 0–1)
MONOCYTES NFR BLD: 6 % (ref 5–13)
NEUTS SEG # BLD: 9 K/UL (ref 1.8–8)
NEUTS SEG NFR BLD: 80 % (ref 32–75)
NRBC # BLD: 0 K/UL (ref 0–0.01)
NRBC BLD-RTO: 0 PER 100 WBC
PLATELET # BLD AUTO: 170 K/UL (ref 150–400)
PMV BLD AUTO: 10.9 FL (ref 8.9–12.9)
POTASSIUM SERPL-SCNC: 4 MMOL/L (ref 3.5–5.1)
PROT SERPL-MCNC: 6.4 G/DL (ref 6.4–8.2)
RBC # BLD AUTO: 3.55 M/UL (ref 3.8–5.2)
RSV RNA SPEC QL NAA+PROBE: NOT DETECTED
RV+EV RNA SPEC QL NAA+PROBE: NOT DETECTED
SARS-COV-2 PCR, COVPCR: NOT DETECTED
SERVICE CMNT-IMP: ABNORMAL
SERVICE CMNT-IMP: NORMAL
SODIUM SERPL-SCNC: 144 MMOL/L (ref 136–145)
WBC # BLD AUTO: 11.4 K/UL (ref 3.6–11)

## 2021-03-20 PROCEDURE — 74011000250 HC RX REV CODE- 250: Performed by: INTERNAL MEDICINE

## 2021-03-20 PROCEDURE — C9113 INJ PANTOPRAZOLE SODIUM, VIA: HCPCS | Performed by: INTERNAL MEDICINE

## 2021-03-20 PROCEDURE — 80053 COMPREHEN METABOLIC PANEL: CPT

## 2021-03-20 PROCEDURE — 36415 COLL VENOUS BLD VENIPUNCTURE: CPT

## 2021-03-20 PROCEDURE — 65270000032 HC RM SEMIPRIVATE

## 2021-03-20 PROCEDURE — 74011250637 HC RX REV CODE- 250/637: Performed by: INTERNAL MEDICINE

## 2021-03-20 PROCEDURE — 82962 GLUCOSE BLOOD TEST: CPT

## 2021-03-20 PROCEDURE — 94760 N-INVAS EAR/PLS OXIMETRY 1: CPT

## 2021-03-20 PROCEDURE — 85025 COMPLETE CBC W/AUTO DIFF WBC: CPT

## 2021-03-20 PROCEDURE — 74011250636 HC RX REV CODE- 250/636: Performed by: FAMILY MEDICINE

## 2021-03-20 PROCEDURE — 74011000258 HC RX REV CODE- 258: Performed by: INTERNAL MEDICINE

## 2021-03-20 PROCEDURE — 74011000258 HC RX REV CODE- 258: Performed by: FAMILY MEDICINE

## 2021-03-20 PROCEDURE — 74011250636 HC RX REV CODE- 250/636: Performed by: INTERNAL MEDICINE

## 2021-03-20 RX ORDER — DEXTROSE MONOHYDRATE AND SODIUM CHLORIDE 5; .9 G/100ML; G/100ML
75 INJECTION, SOLUTION INTRAVENOUS CONTINUOUS
Status: DISCONTINUED | OUTPATIENT
Start: 2021-03-20 | End: 2021-03-22 | Stop reason: HOSPADM

## 2021-03-20 RX ADMIN — VANCOMYCIN HYDROCHLORIDE 750 MG: 750 INJECTION, POWDER, LYOPHILIZED, FOR SOLUTION INTRAVENOUS at 21:35

## 2021-03-20 RX ADMIN — VANCOMYCIN HYDROCHLORIDE 1000 MG: 1 INJECTION, POWDER, LYOPHILIZED, FOR SOLUTION INTRAVENOUS at 05:39

## 2021-03-20 RX ADMIN — HEPARIN SODIUM 5000 UNITS: 5000 INJECTION INTRAVENOUS; SUBCUTANEOUS at 05:39

## 2021-03-20 RX ADMIN — SODIUM CHLORIDE 40 MG: 9 INJECTION INTRAMUSCULAR; INTRAVENOUS; SUBCUTANEOUS at 21:35

## 2021-03-20 RX ADMIN — Medication 10 ML: at 21:36

## 2021-03-20 RX ADMIN — DEXTROSE AND SODIUM CHLORIDE 75 ML/HR: 5; 900 INJECTION, SOLUTION INTRAVENOUS at 09:08

## 2021-03-20 RX ADMIN — HEPARIN SODIUM 5000 UNITS: 5000 INJECTION INTRAVENOUS; SUBCUTANEOUS at 21:36

## 2021-03-20 RX ADMIN — SODIUM CHLORIDE 40 MG: 9 INJECTION INTRAMUSCULAR; INTRAVENOUS; SUBCUTANEOUS at 09:07

## 2021-03-20 RX ADMIN — PIPERACILLIN AND TAZOBACTAM 3.38 G: 3; .375 INJECTION, POWDER, LYOPHILIZED, FOR SOLUTION INTRAVENOUS at 11:41

## 2021-03-20 RX ADMIN — PIPERACILLIN AND TAZOBACTAM 3.38 G: 3; .375 INJECTION, POWDER, LYOPHILIZED, FOR SOLUTION INTRAVENOUS at 19:47

## 2021-03-20 RX ADMIN — Medication 1 CAPSULE: at 09:07

## 2021-03-20 RX ADMIN — PIPERACILLIN AND TAZOBACTAM 3.38 G: 3; .375 INJECTION, POWDER, LYOPHILIZED, FOR SOLUTION INTRAVENOUS at 02:57

## 2021-03-20 RX ADMIN — HEPARIN SODIUM 5000 UNITS: 5000 INJECTION INTRAVENOUS; SUBCUTANEOUS at 13:58

## 2021-03-20 NOTE — PROGRESS NOTES
Patient BG noted at 51. Gave patient OJ, per protocol, rechecked 15 minutes later BG reached 65. Rechecked again 10 minutes later BG at 70. Gave  Second OJ after. Will continue to monitor.

## 2021-03-20 NOTE — PROGRESS NOTES
Gastroenterology Daily Progress Note (Dr. Howard Nj for Dr. Chris Smalls)       Admit Date: 3/19/2021       Subjective:       Patient very hard of hearing. She states her abdominal pain has greatly improved. Tolerated clear liquids thus far for breakfast without N/V. Sitting upright in bed. Some cough. Current Facility-Administered Medications   Medication Dose Route Frequency    dextrose 5% and 0.9% NaCl infusion  75 mL/hr IntraVENous CONTINUOUS    sodium chloride (NS) flush 5-40 mL  5-40 mL IntraVENous Q8H    sodium chloride (NS) flush 5-40 mL  5-40 mL IntraVENous PRN    pantoprazole (PROTONIX) 40 mg in 0.9% sodium chloride 10 mL injection  40 mg IntraVENous Q12H    sodium chloride (NS) flush 5-40 mL  5-40 mL IntraVENous Q8H    sodium chloride (NS) flush 5-40 mL  5-40 mL IntraVENous PRN    acetaminophen (TYLENOL) tablet 650 mg  650 mg Oral Q6H PRN    Or    acetaminophen (TYLENOL) suppository 650 mg  650 mg Rectal Q6H PRN    polyethylene glycol (MIRALAX) packet 17 g  17 g Oral DAILY PRN    promethazine (PHENERGAN) tablet 12.5 mg  12.5 mg Oral Q6H PRN    Or    ondansetron (ZOFRAN) injection 4 mg  4 mg IntraVENous Q6H PRN    heparin (porcine) injection 5,000 Units  5,000 Units SubCUTAneous Q8H    L.acidophilus-paracasei-S.thermophil-bifidobacter (RISAQUAD) 8 billion cell capsule  1 Cap Oral DAILY    piperacillin-tazobactam (ZOSYN) 3.375 g in 0.9% sodium chloride (MBP/ADV) 100 mL MBP  3.375 g IntraVENous Q8H    vancomycin (VANCOCIN) 1,000 mg in 0.9% sodium chloride 250 mL (VIAL-MATE)  1 g IntraVENous Q24H    VANCOMYCIN INFORMATION NOTE   Other PRN        Objective:     Visit Vitals  BP (!) 144/93 (BP 1 Location: Left upper arm, BP Patient Position: At rest)   Pulse 70   Temp 98.4 °F (36.9 °C)   Resp 16   Ht 5' 4\" (1.626 m)   Wt 59.3 kg (130 lb 12.8 oz)   SpO2 95%   BMI 22.45 kg/m²   Blood pressure (!) 144/93, pulse 70, temperature 98.4 °F (36.9 °C), resp.  rate 16, height 5' 4\" (1.626 m), weight 59.3 kg (130 lb 12.8 oz), SpO2 95 %. No intake/output data recorded. No intake/output data recorded. No intake or output data in the 24 hours ending 03/20/21 0949      Physical Exam:       General: elderly WF, [place] in NAD  Chest:  CTA, No rhonchi, rales or rubs. Heart: S1, S2, RRR  GI: Soft, NT, ND + bowel sounds      Labs:       Recent Results (from the past 24 hour(s))   TROPONIN I    Collection Time: 03/19/21 10:38 AM   Result Value Ref Range    Troponin-I, Qt. <0.05 <1.81 ng/mL   METABOLIC PANEL, COMPREHENSIVE    Collection Time: 03/20/21  3:06 AM   Result Value Ref Range    Sodium 144 136 - 145 mmol/L    Potassium 4.0 3.5 - 5.1 mmol/L    Chloride 115 (H) 97 - 108 mmol/L    CO2 21 21 - 32 mmol/L    Anion gap 8 5 - 15 mmol/L    Glucose 42 (LL) 65 - 100 mg/dL    BUN 33 (H) 6 - 20 MG/DL    Creatinine 0.95 0.55 - 1.02 MG/DL    BUN/Creatinine ratio 35 (H) 12 - 20      GFR est AA >60 >60 ml/min/1.73m2    GFR est non-AA 57 (L) >60 ml/min/1.73m2    Calcium 8.8 8.5 - 10.1 MG/DL    Bilirubin, total 0.6 0.2 - 1.0 MG/DL    ALT (SGPT) 46 12 - 78 U/L    AST (SGOT) 37 15 - 37 U/L    Alk. phosphatase 335 (H) 45 - 117 U/L    Protein, total 6.4 6.4 - 8.2 g/dL    Albumin 2.7 (L) 3.5 - 5.0 g/dL    Globulin 3.7 2.0 - 4.0 g/dL    A-G Ratio 0.7 (L) 1.1 - 2.2     CBC WITH AUTOMATED DIFF    Collection Time: 03/20/21  3:06 AM   Result Value Ref Range    WBC 11.4 (H) 3.6 - 11.0 K/uL    RBC 3.55 (L) 3.80 - 5.20 M/uL    HGB 11.2 (L) 11.5 - 16.0 g/dL    HCT 34.8 (L) 35.0 - 47.0 %    MCV 98.0 80.0 - 99.0 FL    MCH 31.5 26.0 - 34.0 PG    MCHC 32.2 30.0 - 36.5 g/dL    RDW 14.0 11.5 - 14.5 %    PLATELET 400 212 - 041 K/uL    MPV 10.9 8.9 - 12.9 FL    NRBC 0.0 0  WBC    ABSOLUTE NRBC 0.00 0.00 - 0.01 K/uL    NEUTROPHILS 80 (H) 32 - 75 %    LYMPHOCYTES 13 12 - 49 %    MONOCYTES 6 5 - 13 %    EOSINOPHILS 0 0 - 7 %    BASOPHILS 0 0 - 1 %    IMMATURE GRANULOCYTES 1 (H) 0.0 - 0.5 %    ABS. NEUTROPHILS 9.0 (H) 1.8 - 8.0 K/UL    ABS. LYMPHOCYTES 1.5 0.8 - 3.5 K/UL    ABS. MONOCYTES 0.7 0.0 - 1.0 K/UL    ABS. EOSINOPHILS 0.0 0.0 - 0.4 K/UL    ABS. BASOPHILS 0.0 0.0 - 0.1 K/UL    ABS. IMM. GRANS. 0.1 (H) 0.00 - 0.04 K/UL    DF AUTOMATED     GLUCOSE, POC    Collection Time: 03/20/21  5:10 AM   Result Value Ref Range    Glucose (POC) 51 (L) 65 - 100 mg/dL    Performed by Kinga hCen,Second [facility], POC    Collection Time: 03/20/21  5:29 AM   Result Value Ref Range    Glucose (POC) 70 65 - 100 mg/dL    Performed by Tonya Novoa    LABRCNT(wbc:2,hgb:2,hct:2,plt:2,)  Recent Labs     03/20/21  0306 03/19/21  0537 03/19/21  0105     --  143   K 4.0  --  4.0   *  --  111*   CO2 21  --  28   BUN 33*  --  34*   CREA 0.95  --  1.10*   GLU 42*  --  100   CA 8.8  --  10.9*   MG  --   --  2.1   PHOS  --  3.1  --    LABRCNT(sgot:3,gpt:3,ap:3,tbiL:3,TP:3,ALB:3,GLOB:3,ggt:3,aml:3,amyp:3,lpse:3,hlpse:3)No results for input(s): INR, PTP, APTT, INREXT in the last 72 hours.   Recent Labs     03/20/21  0306 03/19/21  0105   * 419*   TP 6.4 7.3   ALB 2.7* 3.1*   GLOB 3.7 4.2*   LPSE  --  47*     Recent Labs     03/19/21  1038 03/19/21  0537 03/19/21  0105   TROIQ <0.05 <0.05 <0.05     Impression:  N/V on admission  Gastroparesis  UTI  Distended GB on CT  H/O GERD with distal esophageal stricture       Plan:  Patient with leukocytosis and GB distention on U/S for which HIDA scan has been ordered to r/o cholecystitis but may actually be secondary to gastroparesis and UTI and seems to be improving on current tx.  -continue PPI BID  -consider reglan 5 mg TID if N/V recurs for her gastroparesis  -proceed with HIDA scan as planned       Franck Monreal MD    3/20/2021

## 2021-03-20 NOTE — PROGRESS NOTES
[address] [facility] Adult  Hospitalist Group                                                                                          Hospitalist Progress Note  Yandel Bob MD  Answering service: [phone] OR [phone] from in house phone              Progress Note    Patient: Emilia Campbell MRN: [id number]  SSN: [id number]    YOB: 1945  Age: 67 y.o. Sex: female      Admit Date: 3/19/2021    LOS: 1 day     Subjective:     Patient presents with sepsis and pneumonia. Also having nausea vomiting at home. Here vomiting is resolved and tolerating liquids. Objective:     Vitals:    03/19/21 2000 03/20/21 0248 03/20/21 0538 03/20/21 0909   BP: 133/80 132/75  (!) 144/93   Pulse: 88 85  70   Resp: 19 16 16   Temp: 99.2 °F (37.3 °C) 98.4 °F (36.9 °C)  98.4 °F (36.9 °C)   SpO2: 96% 98%  95%   Weight:   59.3 kg (130 lb 12.8 oz)    Height:            Intake and Output:  Current Shift: No intake/output data recorded. Last three shifts: No intake/output data recorded. Physical Exam:   GENERAL: alert, cooperative, no distress  THROAT & NECK: normal and no erythema or exudates noted. LUNG: clear to auscultation bilaterally  HEART: regular rate and rhythm, S1, S2 normal, no murmur, click, rub or gallop  ABDOMEN: soft, non-tender. Bowel sounds normal. No masses,  no organomegaly  EXTREMITIES:  extremities normal, atraumatic, no cyanosis or edema  SKIN: no rash or abnormalities  NEUROLOGIC: AOx3. PSYCHIATRIC: non focal    Lab/Data Review: All lab results for the last 24 hours reviewed.      Recent Results (from the past 24 hour(s))   CULTURE, MRSA    Collection Time: 03/19/21  6:16 PM    Specimen: Nares; Nasal   Result Value Ref Range    Special Requests: NO SPECIAL REQUESTS      Culture result: MRSA NOT PRESENT AT 19 HOURS     METABOLIC PANEL, COMPREHENSIVE    Collection Time: 03/20/21  3:06 AM   Result Value Ref Range    Sodium 144 136 - 145 mmol/L    Potassium 4.0 3.5 - 5.1 mmol/L Chloride 115 (H) 97 - 108 mmol/L    CO2 21 21 - 32 mmol/L    Anion gap 8 5 - 15 mmol/L    Glucose 42 (LL) 65 - 100 mg/dL    BUN 33 (H) 6 - 20 MG/DL    Creatinine 0.95 0.55 - 1.02 MG/DL    BUN/Creatinine ratio 35 (H) 12 - 20      GFR est AA >60 >60 ml/min/1.73m2    GFR est non-AA 57 (L) >60 ml/min/1.73m2    Calcium 8.8 8.5 - 10.1 MG/DL    Bilirubin, total 0.6 0.2 - 1.0 MG/DL    ALT (SGPT) 46 12 - 78 U/L    AST (SGOT) 37 15 - 37 U/L    Alk. phosphatase 335 (H) 45 - 117 U/L    Protein, total 6.4 6.4 - 8.2 g/dL    Albumin 2.7 (L) 3.5 - 5.0 g/dL    Globulin 3.7 2.0 - 4.0 g/dL    A-G Ratio 0.7 (L) 1.1 - 2.2     CBC WITH AUTOMATED DIFF    Collection Time: 03/20/21  3:06 AM   Result Value Ref Range    WBC 11.4 (H) 3.6 - 11.0 K/uL    RBC 3.55 (L) 3.80 - 5.20 M/uL    HGB 11.2 (L) 11.5 - 16.0 g/dL    HCT 34.8 (L) 35.0 - 47.0 %    MCV 98.0 80.0 - 99.0 FL    MCH 31.5 26.0 - 34.0 PG    MCHC 32.2 30.0 - 36.5 g/dL    RDW 14.0 11.5 - 14.5 %    PLATELET 355 067 - 336 K/uL    MPV 10.9 8.9 - 12.9 FL    NRBC 0.0 0  WBC    ABSOLUTE NRBC 0.00 0.00 - 0.01 K/uL    NEUTROPHILS 80 (H) 32 - 75 %    LYMPHOCYTES 13 12 - 49 %    MONOCYTES 6 5 - 13 %    EOSINOPHILS 0 0 - 7 %    BASOPHILS 0 0 - 1 %    IMMATURE GRANULOCYTES 1 (H) 0.0 - 0.5 %    ABS. NEUTROPHILS 9.0 (H) 1.8 - 8.0 K/UL    ABS. LYMPHOCYTES 1.5 0.8 - 3.5 K/UL    ABS. MONOCYTES 0.7 0.0 - 1.0 K/UL    ABS. EOSINOPHILS 0.0 0.0 - 0.4 K/UL    ABS. BASOPHILS 0.0 0.0 - 0.1 K/UL    ABS. IMM. GRANS. 0.1 (H) 0.00 - 0.04 K/UL    DF AUTOMATED     GLUCOSE, POC    Collection Time: 03/20/21  5:10 AM   Result Value Ref Range    Glucose (POC) 51 (L) 65 - 100 mg/dL    Performed by Kinga Chen,Second Floor [facility], POC    Collection Time: 03/20/21  5:29 AM   Result Value Ref Range    Glucose (POC) 70 65 - 100 mg/dL    Performed by Page Ivory         Imaging:    No results found.        Assessment and Plan:     Sepsis  -Patient with tachycardia and leukocytosis meeting sepsis criteria  -Likely source could be pneumonia, evaluating for possible cholecystitis  -Continue broad-spectrum antibiotic with Vanco and Zosyn  -Follow blood cultures     Pneumonia  -Left lower lobe airspace disease noted on the CT scan  -Continue current antibiotic with Vanco and Zosyn  -Follow blood cultures     Gallbladder distention  -Gallbladder distention noted on CT, patient is nontender on exam  -Waiting for HIDA scan, if abnormal, consult general surgery  -GI following     CHATO  -Likely prerenal in the setting of nausea vomiting with poor p.o. intake  -Improving renal function  -Continue gentle IV fluid hydration     Dyslipidemia  -Hold Lipitor for now     Esophageal stricture  -Patient with current nausea and vomiting on presentation  -Further evaluation by GI  -Currently tolerating liquid diet. Discharge disposition: Likely will be more than 48 hours, daughter states patient independent at home. PT evaluation.     Signed By: Jovita Garza MD     March 20, 2021

## 2021-03-20 NOTE — PROGRESS NOTES
Pharmacy consult note:  Day #2 of Vancomycin  Indication:  Sepsis, PNA  -CT chest with LLL PNA, multiple groundglass densities  -CT abdomen stool, hernia and gallbladder distention  -leukocytosis with neutrophilia, afebrile, on room air,   Current regimen:  1000 mg q24h  Abx regimen: Vanc, Zosyn  ID Following ?: NO  Concomitant nephrotoxic drugs (requires more frequent monitoring): None  Frequency of BMP?: daily through 3/22    Recent Labs     21  0306 21  0105   WBC 11.4* 20.3*   CREA 0.95 1.10*   BUN 33* 34*     Est CrCl: 43 ml/min; UO: -- ml/kg/hr  Temp (24hrs), Av.6 °F (37 °C), Min:98.3 °F (36.8 °C), Max:99.2 °F (37.3 °C)    Cultures:   3/19 blood x2: NGTD - prelim  3/19 resp viral panel: in process  3/19 MRSA screen: not present - prelim    Goal trough = 15 - 20 mcg/mL    Recent trough history (date/time/level/dose/action taken):  N/a    Plan: Will empirically change dose to 750 mg IV q16h for improved renal function. Pharmacy will continue to follow patient daily and order levels / make dose adjustments as appropriate.

## 2021-03-20 NOTE — PROGRESS NOTES
Transition of Care: Patient anticipating discharge home without services. Patient is hard of hearing,  Cm contacted patients daughter Elena Licea ([phone]) , introduced self , explained role and offered support. Patient lives with daughter and son-in-law and was independent prior to admission with adls and iadls. Patient has a rolling walker,cane and shower rails and has no additional needs at this time. Family will transport home when medically discharged.      Reason for Admission:  Nausea, Vomiting                      RUR Score:   14%                 Plan for utilizing home health:  Not at this time       PCP: First and Last name:  Yokasta Lott PA-C   Are you a current patient: Yes/No:  Yes   Approximate date of last visit: Last 6 mths ago   Can you participate in a virtual visit with your PCP: No , hard of hearing                    Current Advanced Directive/Advance Care Plan: Full Code      Healthcare Decision Maker:   Click here to complete [place] including selection of the Healthcare Decision Maker Relationship (ie \"Primary\")

## 2021-03-20 NOTE — ROUTINE PROCESS
Bedside shift change report given to Cristiano Hannah (oncoming nurse) by [facility] (offgoing nurse). Report included the following information SBAR, Kardex, Intake/Output, MAR and Recent Results.

## 2021-03-21 LAB
ANION GAP SERPL CALC-SCNC: 5 MMOL/L (ref 5–15)
BASOPHILS # BLD: 0 K/UL (ref 0–0.1)
BASOPHILS NFR BLD: 1 % (ref 0–1)
BUN SERPL-MCNC: 24 MG/DL (ref 6–20)
BUN/CREAT SERPL: 28 (ref 12–20)
CALCIUM SERPL-MCNC: 8.1 MG/DL (ref 8.5–10.1)
CHLORIDE SERPL-SCNC: 118 MMOL/L (ref 97–108)
CO2 SERPL-SCNC: 24 MMOL/L (ref 21–32)
CREAT SERPL-MCNC: 0.85 MG/DL (ref 0.55–1.02)
DIFFERENTIAL METHOD BLD: ABNORMAL
EOSINOPHIL # BLD: 0 K/UL (ref 0–0.4)
EOSINOPHIL NFR BLD: 1 % (ref 0–7)
ERYTHROCYTE [DISTWIDTH] IN BLOOD BY AUTOMATED COUNT: 13.8 % (ref 11.5–14.5)
GLUCOSE BLD STRIP.AUTO-MCNC: 121 MG/DL (ref 65–100)
GLUCOSE SERPL-MCNC: 87 MG/DL (ref 65–100)
HCT VFR BLD AUTO: 31.1 % (ref 35–47)
HGB BLD-MCNC: 10.2 G/DL (ref 11.5–16)
IMM GRANULOCYTES # BLD AUTO: 0 K/UL (ref 0–0.04)
IMM GRANULOCYTES NFR BLD AUTO: 0 % (ref 0–0.5)
LYMPHOCYTES # BLD: 1.4 K/UL (ref 0.8–3.5)
LYMPHOCYTES NFR BLD: 23 % (ref 12–49)
MCH RBC QN AUTO: 31.4 PG (ref 26–34)
MCHC RBC AUTO-ENTMCNC: 32.8 G/DL (ref 30–36.5)
MCV RBC AUTO: 95.7 FL (ref 80–99)
MONOCYTES # BLD: 0.5 K/UL (ref 0–1)
MONOCYTES NFR BLD: 7 % (ref 5–13)
NEUTS SEG # BLD: 4.3 K/UL (ref 1.8–8)
NEUTS SEG NFR BLD: 68 % (ref 32–75)
NRBC # BLD: 0 K/UL (ref 0–0.01)
NRBC BLD-RTO: 0 PER 100 WBC
PLATELET # BLD AUTO: 146 K/UL (ref 150–400)
PMV BLD AUTO: 10.6 FL (ref 8.9–12.9)
POTASSIUM SERPL-SCNC: 3.7 MMOL/L (ref 3.5–5.1)
RBC # BLD AUTO: 3.25 M/UL (ref 3.8–5.2)
SERVICE CMNT-IMP: ABNORMAL
SODIUM SERPL-SCNC: 147 MMOL/L (ref 136–145)
WBC # BLD AUTO: 6.3 K/UL (ref 3.6–11)

## 2021-03-21 PROCEDURE — 74011250637 HC RX REV CODE- 250/637: Performed by: FAMILY MEDICINE

## 2021-03-21 PROCEDURE — 82962 GLUCOSE BLOOD TEST: CPT

## 2021-03-21 PROCEDURE — 74011250637 HC RX REV CODE- 250/637: Performed by: INTERNAL MEDICINE

## 2021-03-21 PROCEDURE — 74011000258 HC RX REV CODE- 258: Performed by: INTERNAL MEDICINE

## 2021-03-21 PROCEDURE — 80048 BASIC METABOLIC PNL TOTAL CA: CPT

## 2021-03-21 PROCEDURE — 74011250636 HC RX REV CODE- 250/636: Performed by: INTERNAL MEDICINE

## 2021-03-21 PROCEDURE — 65270000032 HC RM SEMIPRIVATE

## 2021-03-21 PROCEDURE — C9113 INJ PANTOPRAZOLE SODIUM, VIA: HCPCS | Performed by: INTERNAL MEDICINE

## 2021-03-21 PROCEDURE — 36415 COLL VENOUS BLD VENIPUNCTURE: CPT

## 2021-03-21 PROCEDURE — 74011250636 HC RX REV CODE- 250/636: Performed by: FAMILY MEDICINE

## 2021-03-21 PROCEDURE — 97161 PT EVAL LOW COMPLEX 20 MIN: CPT

## 2021-03-21 PROCEDURE — 74011000250 HC RX REV CODE- 250: Performed by: INTERNAL MEDICINE

## 2021-03-21 PROCEDURE — 94760 N-INVAS EAR/PLS OXIMETRY 1: CPT

## 2021-03-21 PROCEDURE — 85025 COMPLETE CBC W/AUTO DIFF WBC: CPT

## 2021-03-21 PROCEDURE — 97116 GAIT TRAINING THERAPY: CPT

## 2021-03-21 RX ORDER — POLYETHYLENE GLYCOL 3350 17 G/17G
17 POWDER, FOR SOLUTION ORAL DAILY
Status: DISCONTINUED | OUTPATIENT
Start: 2021-03-22 | End: 2021-03-21

## 2021-03-21 RX ORDER — METOCLOPRAMIDE HYDROCHLORIDE 5 MG/ML
5 INJECTION INTRAMUSCULAR; INTRAVENOUS EVERY 6 HOURS
Status: DISCONTINUED | OUTPATIENT
Start: 2021-03-21 | End: 2021-03-22 | Stop reason: HOSPADM

## 2021-03-21 RX ORDER — POLYETHYLENE GLYCOL 3350 17 G/17G
17 POWDER, FOR SOLUTION ORAL DAILY
Status: DISCONTINUED | OUTPATIENT
Start: 2021-03-21 | End: 2021-03-22 | Stop reason: HOSPADM

## 2021-03-21 RX ADMIN — PIPERACILLIN AND TAZOBACTAM 3.38 G: 3; .375 INJECTION, POWDER, LYOPHILIZED, FOR SOLUTION INTRAVENOUS at 03:41

## 2021-03-21 RX ADMIN — SODIUM CHLORIDE 40 MG: 9 INJECTION INTRAMUSCULAR; INTRAVENOUS; SUBCUTANEOUS at 21:45

## 2021-03-21 RX ADMIN — Medication 10 ML: at 14:52

## 2021-03-21 RX ADMIN — METOCLOPRAMIDE 5 MG: 5 INJECTION, SOLUTION INTRAMUSCULAR; INTRAVENOUS at 14:51

## 2021-03-21 RX ADMIN — POLYETHYLENE GLYCOL 3350 17 G: 17 POWDER, FOR SOLUTION ORAL at 21:19

## 2021-03-21 RX ADMIN — PIPERACILLIN AND TAZOBACTAM 3.38 G: 3; .375 INJECTION, POWDER, LYOPHILIZED, FOR SOLUTION INTRAVENOUS at 21:18

## 2021-03-21 RX ADMIN — HEPARIN SODIUM 5000 UNITS: 5000 INJECTION INTRAVENOUS; SUBCUTANEOUS at 14:51

## 2021-03-21 RX ADMIN — HEPARIN SODIUM 5000 UNITS: 5000 INJECTION INTRAVENOUS; SUBCUTANEOUS at 06:31

## 2021-03-21 RX ADMIN — Medication 10 ML: at 06:31

## 2021-03-21 RX ADMIN — VANCOMYCIN HYDROCHLORIDE 750 MG: 750 INJECTION, POWDER, LYOPHILIZED, FOR SOLUTION INTRAVENOUS at 14:51

## 2021-03-21 RX ADMIN — Medication 10 ML: at 21:46

## 2021-03-21 RX ADMIN — HEPARIN SODIUM 5000 UNITS: 5000 INJECTION INTRAVENOUS; SUBCUTANEOUS at 21:45

## 2021-03-21 RX ADMIN — PIPERACILLIN AND TAZOBACTAM 3.38 G: 3; .375 INJECTION, POWDER, LYOPHILIZED, FOR SOLUTION INTRAVENOUS at 12:27

## 2021-03-21 RX ADMIN — Medication 1 CAPSULE: at 09:46

## 2021-03-21 RX ADMIN — SODIUM CHLORIDE 40 MG: 9 INJECTION INTRAMUSCULAR; INTRAVENOUS; SUBCUTANEOUS at 09:46

## 2021-03-21 NOTE — PROGRESS NOTES
Bedside shift change report given to Belen Dunne (oncoming nurse) by Yasmany Ngo RN  (offgoing nurse). Report included the following information SBAR, MAR and Recent Results.

## 2021-03-21 NOTE — PROGRESS NOTES
Problem: Mobility Impaired (Adult and Pediatric)  Goal: *Acute Goals and Plan of Care (Insert Text)  Description: FUNCTIONAL STATUS PRIOR TO ADMISSION: Patient was modified independent using a single point cane for functional mobility. Pt reports all ADLs performed without issue    HOME SUPPORT PRIOR TO ADMISSION: The patient lived with family but did not require assist.    Physical Therapy Goals  Initiated 3/21/2021  1. Patient will move from supine to sit and sit to supine , scoot up and down, and roll side to side in bed with independence within 7 day(s). 2.  Patient will transfer from bed to chair and chair to bed with independence using the least restrictive device within 7 day(s). 3.  Patient will perform sit to stand with independence within 7 day(s). 4.  Patient will ambulate with modified independence for 150 feet with the least restrictive device within 7 day(s). 5.  Patient will ascend/descend 2 stairs with 1 handrail(s) with modified independence within 7 day(s). Outcome: Progressing Towards Goal     PHYSICAL THERAPY EVALUATION  Patient: Andrea Atkins (86 y.o. female)  Date: 3/21/2021  Primary Diagnosis: Sepsis ([address] [A41.9]        Precautions: falls         ASSESSMENT  Based on the objective data described below, the patient presents with sepsis and PNA, noted to have some tachycardia upon admission. Pt lives in an apartment with family, does not require assistance for ADLs, and uses a cane in the home, 2 steps to enter. Pt tolerates standing from bed, able to ambulate around room and into hallway with supervision and RW. Pt occasionally collides with objects in room with RW, however no LOB noted and it is cramped in the room Pt becomes fatigued after 100ft, sits down at 120ft. Pt demos good capability to ambulate to toilet with assistance and recommend pt sit in the chair for meals.  Will continue to follow, patient may benefit from [address] if desired at this time, but may progress past [facility] if pt remains admitted. Current Level of Function Impacting Discharge (mobility/balance): supervision for stability, may need upgrade of RW. Functional Outcome Measure: The patient scored Total: 75/100 on the Barthel Index which is indicative of low impaired ability to care for basic self needs/dependency on others. Other factors to consider for discharge:      Patient will benefit from skilled therapy intervention to address the above noted impairments. PLAN :  Recommendations and Planned Interventions: bed mobility training, transfer training, gait training, therapeutic exercises, neuromuscular re-education, patient and family training/education and therapeutic activities      Frequency/Duration: Patient will be followed by physical therapy:  3 times a week to address goals. Recommendation for discharge: (in order for the patient to meet his/her long term goals)  Physical therapy at least 2 days/week in the home if patient desires    This discharge recommendation:  Has been made in collaboration with the attending provider and/or case management    IF patient discharges home will need the following DME: rolling walker and patient owns other DME required for discharge         SUBJECTIVE:   Patient stated I feel okay, I do all my own bathing and such.     OBJECTIVE DATA SUMMARY:   HISTORY:    Past Medical History:   Diagnosis Date    Arthritis     Deaf, nonspeaking     High cholesterol     Inner ear dysfunction      Past Surgical History:   Procedure Laterality Date    HX HYSTERECTOMY         Personal factors and/or comorbidities impacting plan of care:     Home Situation  Home Environment: Private residence  # Steps to Enter: 2  Rails to Enter: Yes  Hand Rails : Right  Wheelchair Ramp: No  One/Two Story Residence: One story  Living Alone: No  Support Systems: Child(janette), Family member(s)  Patient Expects to be Discharged to[de-identified] Private residence  Current DME Used/Available at Home: Clarence Aviles straight    EXAMINATION/PRESENTATION/DECISION MAKING:   Critical Behavior:              Hearing: Auditory  Auditory Impairment: Hard of hearing, bilateral  Skin:  intact  Edema: none  Range Of Motion:  AROM: Generally decreased, functional           PROM: Generally decreased, functional           Strength:    Strength: Generally decreased, functional                    Tone & Sensation:                                  Coordination:  Coordination: Generally decreased, functional  Vision:      Functional Mobility:  Bed Mobility:  Rolling: Independent  Supine to Sit: Independent  Sit to Supine: Independent  Scooting: Independent  Transfers:  Sit to Stand: Supervision  Stand to Sit: Supervision  Stand Pivot Transfers: Supervision                    Balance:   Sitting: Intact  Standing: With support; Intact  Ambulation/Gait Training:  Distance (ft): 120 Feet (ft)  Assistive Device: Walker, rolling  Ambulation - Level of Assistance: Supervision     Gait Description (WDL): Exceptions to WDL  Gait Abnormalities: Shuffling gait        Base of Support: Widened     Speed/Yadira: Shuffled; Slow  Step Length: Right shortened;Left shortened       Functional Measure:  Barthel Index:    Bathin  Bladder: 10  Bowels: 10  Groomin  Dressing: 10  Feeding: 10  Mobility: 0  Stairs: 5  Toilet Use: 5  Transfer (Bed to Chair and Back): 15  Total: 75/100       The Barthel ADL Index: Guidelines  1. The index should be used as a record of what a patient does, not as a record of what a patient could do. 2. The main aim is to establish degree of independence from any help, physical or verbal, however minor and for whatever reason. 3. The need for supervision renders the patient not independent. 4. A patient's performance should be established using the best available evidence. Asking the patient, friends/relatives and nurses are the usual sources, but direct observation and common sense are also important.  However direct testing is not needed. 5. Usually the patient's performance over the preceding 24-48 hours is important, but occasionally longer periods will be relevant. 6. Middle categories imply that the patient supplies over 50 per cent of the effort. 7. Use of aids to be independent is allowed. Frederica Del., Barthel, D.W. (3150). Functional evaluation: the Barthel Index. [address] [facility] (14)2. FRED Joyner, Myrla Holter., Ishan Quijano., [place], [facility] (1999). Measuring the change indisability after inpatient rehabilitation; comparison of the responsiveness of the Barthel Index and Functional [place] Measure. Journal of Neurology, Neurosurgery, and Psychiatry, 66(4), 315-281. Ginette Adan, NSEE.A, JUDY Quinonez, & Martina Kramer M.A. (2004.) Assessment of post-stroke quality of life in cost-effectiveness studies: The usefulness of the Barthel Index and the EuroQoL-5D. Quality of Life Research, 15, 335-38        Physical Therapy Evaluation Charge Determination   History Examination Presentation Decision-Making   HIGH Complexity :3+ comorbidities / personal factors will impact the outcome/ POC  MEDIUM Complexity : 3 Standardized tests and measures addressing body structure, function, activity limitation and / or participation in recreation  LOW Complexity : Stable, uncomplicated  Other outcome measures barthel  LOW       Based on the above components, the patient evaluation is determined to be of the following complexity level: LOW     Activity Tolerance:   Good, Fair and requires rest breaks    After treatment patient left in no apparent distress:   Supine in bed and Call bell within reach    COMMUNICATION/EDUCATION:   The patients plan of care was discussed with: Registered nurse and Case management. Fall prevention education was provided and the patient/caregiver indicated understanding. and Patient/family have participated as able in goal setting and plan of care.     Thank you for this referral.  Saulo Hood, PT   Time Calculation: 24 mins

## 2021-03-21 NOTE — PROGRESS NOTES
Gastroenterology Daily Progress Note (Dr. John Singh for Dr. Anthony Aguirre)       Admit Date: 3/19/2021       Subjective:         Some coughing still. No vomiting on clears. Appetite improving. Current Facility-Administered Medications   Medication Dose Route Frequency    dextrose 5% and 0.9% NaCl infusion  75 mL/hr IntraVENous CONTINUOUS    vancomycin (VANCOCIN) 750 mg in 0.9% sodium chloride 250 mL (VIAL-MATE)  750 mg IntraVENous Q16H    sodium chloride (NS) flush 5-40 mL  5-40 mL IntraVENous Q8H    sodium chloride (NS) flush 5-40 mL  5-40 mL IntraVENous PRN    pantoprazole (PROTONIX) 40 mg in 0.9% sodium chloride 10 mL injection  40 mg IntraVENous Q12H    sodium chloride (NS) flush 5-40 mL  5-40 mL IntraVENous Q8H    sodium chloride (NS) flush 5-40 mL  5-40 mL IntraVENous PRN    acetaminophen (TYLENOL) tablet 650 mg  650 mg Oral Q6H PRN    Or    acetaminophen (TYLENOL) suppository 650 mg  650 mg Rectal Q6H PRN    polyethylene glycol (MIRALAX) packet 17 g  17 g Oral DAILY PRN    promethazine (PHENERGAN) tablet 12.5 mg  12.5 mg Oral Q6H PRN    Or    ondansetron (ZOFRAN) injection 4 mg  4 mg IntraVENous Q6H PRN    heparin (porcine) injection 5,000 Units  5,000 Units SubCUTAneous Q8H    L.acidophilus-paracasei-S.thermophil-bifidobacter (RISAQUAD) 8 billion cell capsule  1 Cap Oral DAILY    piperacillin-tazobactam (ZOSYN) 3.375 g in 0.9% sodium chloride (MBP/ADV) 100 mL MBP  3.375 g IntraVENous Q8H    VANCOMYCIN INFORMATION NOTE   Other PRN        Objective:     Visit Vitals  /62 (BP 1 Location: Left upper arm, BP Patient Position: At rest)   Pulse 68   Temp 98.1 °F (36.7 °C)   Resp 18   Ht 5' 4\" (1.626 m)   Wt 58.3 kg (128 lb 8.5 oz)   SpO2 94%   BMI 22.06 kg/m²   Blood pressure 106/62, pulse 68, temperature 98.1 °F (36.7 °C), resp. rate 18, height 5' 4\" (1.626 m), weight 58.3 kg (128 lb 8.5 oz), SpO2 94 %. No intake/output data recorded.     03/19 1901 - 03/21 0700  In: 240 [P.O.:240]  Out: -       Intake/Output Summary (Last 24 hours) at 3/21/2021 0837  Last data filed at 3/20/2021 1800  Gross per 24 hour   Intake 240 ml   Output    Net 240 ml     Physical Exam:     General: elderly WF, [place] in NAD  GI: Soft, NT, ND + bowel sounds    Labs:     Recent Results (from the past 24 hour(s))   GLUCOSE, POC    Collection Time: 03/20/21  9:08 PM   Result Value Ref Range    Glucose (POC) 92 65 - 100 mg/dL    Performed by Lazarus Lieu    METABOLIC PANEL, BASIC    Collection Time: 03/21/21  1:16 AM   Result Value Ref Range    Sodium 147 (H) 136 - 145 mmol/L    Potassium 3.7 3.5 - 5.1 mmol/L    Chloride 118 (H) 97 - 108 mmol/L    CO2 24 21 - 32 mmol/L    Anion gap 5 5 - 15 mmol/L    Glucose 87 65 - 100 mg/dL    BUN 24 (H) 6 - 20 MG/DL    Creatinine 0.85 0.55 - 1.02 MG/DL    BUN/Creatinine ratio 28 (H) 12 - 20      GFR est AA >60 >60 ml/min/1.73m2    GFR est non-AA >60 >60 ml/min/1.73m2    Calcium 8.1 (L) 8.5 - 10.1 MG/DL   CBC WITH AUTOMATED DIFF    Collection Time: 03/21/21  1:16 AM   Result Value Ref Range    WBC 6.3 3.6 - 11.0 K/uL    RBC 3.25 (L) 3.80 - 5.20 M/uL    HGB 10.2 (L) 11.5 - 16.0 g/dL    HCT 31.1 (L) 35.0 - 47.0 %    MCV 95.7 80.0 - 99.0 FL    MCH 31.4 26.0 - 34.0 PG    MCHC 32.8 30.0 - 36.5 g/dL    RDW 13.8 11.5 - 14.5 %    PLATELET 783 (L) 210 - 400 K/uL    MPV 10.6 8.9 - 12.9 FL    NRBC 0.0 0  WBC    ABSOLUTE NRBC 0.00 0.00 - 0.01 K/uL    NEUTROPHILS 68 32 - 75 %    LYMPHOCYTES 23 12 - 49 %    MONOCYTES 7 5 - 13 %    EOSINOPHILS 1 0 - 7 %    BASOPHILS 1 0 - 1 %    IMMATURE GRANULOCYTES 0 0.0 - 0.5 %    ABS. NEUTROPHILS 4.3 1.8 - 8.0 K/UL    ABS. LYMPHOCYTES 1.4 0.8 - 3.5 K/UL    ABS. MONOCYTES 0.5 0.0 - 1.0 K/UL    ABS. EOSINOPHILS 0.0 0.0 - 0.4 K/UL    ABS. BASOPHILS 0.0 0.0 - 0.1 K/UL    ABS. IMM.  GRANS. 0.0 0.00 - 0.04 K/UL    DF AUTOMATED     LABRCNT(wbc:2,hgb:2,hct:2,plt:2,)  Recent Labs     03/21/21  0116 03/20/21  0306 03/19/21  0537 03/19/21  0105   * 144 --  143   K 3.7 4.0  --  4.0   * 115*  --  111*   CO2 24 21  --  28   BUN 24* 33*  --  34*   CREA 0.85 0.95  --  1.10*   GLU 87 42*  --  100   CA 8.1* 8.8  --  10.9*   MG  --   --   --  2.1   PHOS  --   --  3.1  --      Recent Labs     03/20/21  0306 03/19/21  0105   * 419*   TP 6.4 7.3   ALB 2.7* 3.1*   GLOB 3.7 4.2*   LPSE  --  47*     Recent Labs     03/19/21  1038 03/19/21  0537 03/19/21  0105   TROIQ <0.05 <0.05 <0.05     Impression:    N/V on admission  Gastroparesis  UTI  Distended GB on CT  H/O GERD with distal esophageal stricture       Plan:  No further vomiting, doubt this is cholecystitis given no abdominal pain and no further vomiting.   Likely gastroparesis  Worsened by UTI and pneumonia.    -continue PPI BID  -consider reglan 5 mg TID if N/V recurs for her gastroparesis  -given no HIDA on weekend and resolved vomiting will cancel HIDA for now  -patient will go on [address] (requested by daughter Calvin Smith whom I called this am)  -Dr. Ela Rudd to see in am       Ping Lomax MD    3/21/2021

## 2021-03-21 NOTE — PROGRESS NOTES
[facility] Adult  Hospitalist Group                                                                                          Hospitalist Progress Note  Fay Salter MD  Answering service: [phone] OR [phone] from in house phone              Progress Note    Patient: Nimo Sandoval MRN: [id number]  SSN: [id number]    YOB: 1945  Age: 67 y.o. Sex: female      Admit Date: 3/19/2021    LOS: 2 days     Subjective:     Patient presents with sepsis and pneumonia. Also having nausea vomiting at home. Here vomiting is resolved and tolerating diet. Feeling much better today. Objective:     Vitals:    03/21/21 0059 03/21/21 0630 03/21/21 0839 03/21/21 1222   BP: 106/62  137/73    Pulse: 68  73    Resp: 18  19    Temp: 98.1 °F (36.7 °C)  97.8 °F (36.6 °C)    SpO2: 94%  98% 97%   Weight:  58.3 kg (128 lb 8.5 oz)     Height:            Intake and Output:  Current Shift: No intake/output data recorded. Last three shifts: 03/19 1901 - 03/21 0700  In: 240 [P.O.:240]  Out: -     Physical Exam:   GENERAL: alert, cooperative, no distress  THROAT & NECK: normal and no erythema or exudates noted. LUNG: clear to auscultation bilaterally  HEART: regular rate and rhythm, S1, S2 normal, no murmur, click, rub or gallop  ABDOMEN: soft, non-tender. Bowel sounds normal. No masses,  no organomegaly  EXTREMITIES:  extremities normal, atraumatic, no cyanosis or edema  SKIN: no rash or abnormalities  NEUROLOGIC: AOx3. PSYCHIATRIC: non focal    Lab/Data Review: All lab results for the last 24 hours reviewed.      Recent Results (from the past 24 hour(s))   GLUCOSE, POC    Collection Time: 03/20/21  9:08 PM   Result Value Ref Range    Glucose (POC) 92 65 - 100 mg/dL    Performed by Kesha Sydni    METABOLIC PANEL, BASIC    Collection Time: 03/21/21  1:16 AM   Result Value Ref Range    Sodium 147 (H) 136 - 145 mmol/L    Potassium 3.7 3.5 - 5.1 mmol/L    Chloride 118 (H) 97 - 108 mmol/L    CO2 24 21 - 32 mmol/L    Anion gap 5 5 - 15 mmol/L    Glucose 87 65 - 100 mg/dL    BUN 24 (H) 6 - 20 MG/DL    Creatinine 0.85 0.55 - 1.02 MG/DL    BUN/Creatinine ratio 28 (H) 12 - 20      GFR est AA >60 >60 ml/min/1.73m2    GFR est non-AA >60 >60 ml/min/1.73m2    Calcium 8.1 (L) 8.5 - 10.1 MG/DL   CBC WITH AUTOMATED DIFF    Collection Time: 03/21/21  1:16 AM   Result Value Ref Range    WBC 6.3 3.6 - 11.0 K/uL    RBC 3.25 (L) 3.80 - 5.20 M/uL    HGB 10.2 (L) 11.5 - 16.0 g/dL    HCT 31.1 (L) 35.0 - 47.0 %    MCV 95.7 80.0 - 99.0 FL    MCH 31.4 26.0 - 34.0 PG    MCHC 32.8 30.0 - 36.5 g/dL    RDW 13.8 11.5 - 14.5 %    PLATELET 956 (L) 055 - 400 K/uL    MPV 10.6 8.9 - 12.9 FL    NRBC 0.0 0  WBC    ABSOLUTE NRBC 0.00 0.00 - 0.01 K/uL    NEUTROPHILS 68 32 - 75 %    LYMPHOCYTES 23 12 - 49 %    MONOCYTES 7 5 - 13 %    EOSINOPHILS 1 0 - 7 %    BASOPHILS 1 0 - 1 %    IMMATURE GRANULOCYTES 0 0.0 - 0.5 %    ABS. NEUTROPHILS 4.3 1.8 - 8.0 K/UL    ABS. LYMPHOCYTES 1.4 0.8 - 3.5 K/UL    ABS. MONOCYTES 0.5 0.0 - 1.0 K/UL    ABS. EOSINOPHILS 0.0 0.0 - 0.4 K/UL    ABS. BASOPHILS 0.0 0.0 - 0.1 K/UL    ABS. IMM. GRANS. 0.0 0.00 - 0.04 K/UL    DF AUTOMATED          Imaging:    No results found.        Assessment and Plan:     Sepsis  -Patient with tachycardia and leukocytosis meeting sepsis criteria  -Likely source could be pneumonia, evaluating for possible cholecystitis  -Overall improving     Pneumonia  -Left lower lobe airspace disease noted on the CT scan  -Continue Zosyn, discontinue vancomycin  -Blood cultures so far negative     Gallbladder distention  -Gallbladder distention noted on CT, patient is nontender on exam  -HIDA scan has been canceled by GI  -Overall clinically improving, doubt patient has cholecystitis     CHATO  -Likely prerenal in the setting of nausea vomiting with poor p.o. intake  -Improving renal function  -Continue gentle IV fluid hydration     Dyslipidemia  -Hold Lipitor for now     Esophageal stricture  -Patient with current nausea and vomiting on presentation  -Further evaluation by GI  -Diet upgraded by GI        Discharge disposition: Likely in 24 to 48 hours.     Signed By: Kim Hernandez MD     March 21, 2021

## 2021-03-22 VITALS
SYSTOLIC BLOOD PRESSURE: 130 MMHG | OXYGEN SATURATION: 97 % | DIASTOLIC BLOOD PRESSURE: 72 MMHG | TEMPERATURE: 97.5 F | BODY MASS INDEX: 22.06 KG/M2 | RESPIRATION RATE: 18 BRPM | HEART RATE: 74 BPM | WEIGHT: 129.19 LBS | HEIGHT: 64 IN

## 2021-03-22 LAB
ANION GAP SERPL CALC-SCNC: 6 MMOL/L (ref 5–15)
BASOPHILS # BLD: 0 K/UL (ref 0–0.1)
BASOPHILS NFR BLD: 1 % (ref 0–1)
BUN SERPL-MCNC: 17 MG/DL (ref 6–20)
BUN/CREAT SERPL: 20 (ref 12–20)
CALCIUM SERPL-MCNC: 7.9 MG/DL (ref 8.5–10.1)
CHLORIDE SERPL-SCNC: 117 MMOL/L (ref 97–108)
CO2 SERPL-SCNC: 22 MMOL/L (ref 21–32)
CREAT SERPL-MCNC: 0.83 MG/DL (ref 0.55–1.02)
DIFFERENTIAL METHOD BLD: ABNORMAL
EOSINOPHIL # BLD: 0.1 K/UL (ref 0–0.4)
EOSINOPHIL NFR BLD: 1 % (ref 0–7)
ERYTHROCYTE [DISTWIDTH] IN BLOOD BY AUTOMATED COUNT: 13.6 % (ref 11.5–14.5)
GLUCOSE SERPL-MCNC: 105 MG/DL (ref 65–100)
HCT VFR BLD AUTO: 32.6 % (ref 35–47)
HGB BLD-MCNC: 10.5 G/DL (ref 11.5–16)
IMM GRANULOCYTES # BLD AUTO: 0 K/UL (ref 0–0.04)
IMM GRANULOCYTES NFR BLD AUTO: 1 % (ref 0–0.5)
LYMPHOCYTES # BLD: 1.5 K/UL (ref 0.8–3.5)
LYMPHOCYTES NFR BLD: 32 % (ref 12–49)
MCH RBC QN AUTO: 31.8 PG (ref 26–34)
MCHC RBC AUTO-ENTMCNC: 32.2 G/DL (ref 30–36.5)
MCV RBC AUTO: 98.8 FL (ref 80–99)
MONOCYTES # BLD: 0.5 K/UL (ref 0–1)
MONOCYTES NFR BLD: 10 % (ref 5–13)
NEUTS SEG # BLD: 2.6 K/UL (ref 1.8–8)
NEUTS SEG NFR BLD: 55 % (ref 32–75)
NRBC # BLD: 0 K/UL (ref 0–0.01)
NRBC BLD-RTO: 0 PER 100 WBC
PLATELET # BLD AUTO: 131 K/UL (ref 150–400)
PMV BLD AUTO: 10.8 FL (ref 8.9–12.9)
POTASSIUM SERPL-SCNC: 3.7 MMOL/L (ref 3.5–5.1)
RBC # BLD AUTO: 3.3 M/UL (ref 3.8–5.2)
SODIUM SERPL-SCNC: 145 MMOL/L (ref 136–145)
WBC # BLD AUTO: 4.7 K/UL (ref 3.6–11)

## 2021-03-22 PROCEDURE — 74011250636 HC RX REV CODE- 250/636: Performed by: FAMILY MEDICINE

## 2021-03-22 PROCEDURE — 74011000258 HC RX REV CODE- 258: Performed by: INTERNAL MEDICINE

## 2021-03-22 PROCEDURE — 94760 N-INVAS EAR/PLS OXIMETRY 1: CPT

## 2021-03-22 PROCEDURE — C9113 INJ PANTOPRAZOLE SODIUM, VIA: HCPCS | Performed by: INTERNAL MEDICINE

## 2021-03-22 PROCEDURE — 36415 COLL VENOUS BLD VENIPUNCTURE: CPT

## 2021-03-22 PROCEDURE — 74011250637 HC RX REV CODE- 250/637: Performed by: INTERNAL MEDICINE

## 2021-03-22 PROCEDURE — 74011250636 HC RX REV CODE- 250/636: Performed by: INTERNAL MEDICINE

## 2021-03-22 PROCEDURE — 85025 COMPLETE CBC W/AUTO DIFF WBC: CPT

## 2021-03-22 PROCEDURE — 80048 BASIC METABOLIC PNL TOTAL CA: CPT

## 2021-03-22 RX ORDER — AMOXICILLIN AND CLAVULANATE POTASSIUM 500; 125 MG/1; MG/1
1 TABLET, FILM COATED ORAL 2 TIMES DAILY
Qty: 5 TAB | Refills: 0 | Status: SHIPPED | OUTPATIENT
Start: 2021-03-22 | End: 2021-04-07

## 2021-03-22 RX ORDER — METOCLOPRAMIDE 5 MG/1
5 TABLET ORAL
Qty: 120 TAB | Refills: 0 | Status: SHIPPED | OUTPATIENT
Start: 2021-03-22 | End: 2021-04-07

## 2021-03-22 RX ADMIN — Medication 10 ML: at 14:43

## 2021-03-22 RX ADMIN — HEPARIN SODIUM 5000 UNITS: 5000 INJECTION INTRAVENOUS; SUBCUTANEOUS at 14:42

## 2021-03-22 RX ADMIN — METOCLOPRAMIDE 5 MG: 5 INJECTION, SOLUTION INTRAMUSCULAR; INTRAVENOUS at 05:39

## 2021-03-22 RX ADMIN — HEPARIN SODIUM 5000 UNITS: 5000 INJECTION INTRAVENOUS; SUBCUTANEOUS at 05:39

## 2021-03-22 RX ADMIN — Medication 10 ML: at 05:40

## 2021-03-22 RX ADMIN — PIPERACILLIN AND TAZOBACTAM 3.38 G: 3; .375 INJECTION, POWDER, LYOPHILIZED, FOR SOLUTION INTRAVENOUS at 02:38

## 2021-03-22 RX ADMIN — Medication 1 CAPSULE: at 09:04

## 2021-03-22 RX ADMIN — VANCOMYCIN HYDROCHLORIDE 750 MG: 750 INJECTION, POWDER, LYOPHILIZED, FOR SOLUTION INTRAVENOUS at 06:45

## 2021-03-22 RX ADMIN — SODIUM CHLORIDE 40 MG: 9 INJECTION INTRAMUSCULAR; INTRAVENOUS; SUBCUTANEOUS at 09:04

## 2021-03-22 RX ADMIN — METOCLOPRAMIDE 5 MG: 5 INJECTION, SOLUTION INTRAMUSCULAR; INTRAVENOUS at 00:30

## 2021-03-22 RX ADMIN — PIPERACILLIN AND TAZOBACTAM 3.38 G: 3; .375 INJECTION, POWDER, LYOPHILIZED, FOR SOLUTION INTRAVENOUS at 11:56

## 2021-03-22 RX ADMIN — METOCLOPRAMIDE 5 MG: 5 INJECTION, SOLUTION INTRAMUSCULAR; INTRAVENOUS at 11:56

## 2021-03-22 NOTE — DISCHARGE INSTRUCTIONS
Patient Education        Nausea and Vomiting: Care Instructions  Your Care Instructions     When you are nauseated, you may feel weak and sweaty and notice a lot of saliva in your mouth. Nausea often leads to vomiting. Most of the time you do not need to worry about nausea and vomiting, but they can be signs of other illnesses. Two common causes of nausea and vomiting are stomach flu and food poisoning. Nausea and vomiting from viral stomach flu will usually start to improve within 24 hours. Nausea and vomiting from food poisoning may last from 12 to 48 hours. The doctor has checked you carefully, but problems can develop later. If you notice any problems or new symptoms, get medical treatment right away. Follow-up care is a key part of your treatment and safety. Be sure to make and go to all appointments, and call your doctor if you are having problems. It's also a good idea to know your test results and keep a list of the medicines you take. How can you care for yourself at home? · To prevent dehydration, drink plenty of fluids, enough so that your urine is light yellow or clear like water. Choose water and other caffeine-free clear liquids until you feel better. If you have kidney, heart, or liver disease and have to limit fluids, talk with your doctor before you increase the amount of fluids you drink. · Rest in bed until you feel better. · When you are able to eat, try clear soups, mild foods, and liquids until all symptoms are gone for 12 to 48 hours. Other good choices include dry toast, crackers, cooked cereal, and gelatin dessert, such as Jell-O. When should you call for help? Call 911 anytime you think you may need emergency care. For example, call if:    · You passed out (lost consciousness). Call your doctor now or seek immediate medical care if:    · You have symptoms of dehydration, such as:  ? Dry eyes and a dry mouth. ? Passing only a little dark urine. ?  Feeling thirstier than usual.   · You have new or worsening belly pain.     · You have a new or higher fever.     · You vomit blood or what looks like coffee grounds. Watch closely for changes in your health, and be sure to contact your doctor if:    · You have ongoing nausea and vomiting.     · Your vomiting is getting worse.     · Your vomiting lasts longer than 2 days.     · You are not getting better as expected. Where can you learn more? Go to http://www.zhang.com/  Enter H591 in the search box to learn more about \"Nausea and Vomiting: Care Instructions. \"  Current as of: June 26, 2019               Content Version: 12.6  © 2063-9674 [employer]. Care instructions adapted under license by [employer] (which disclaims liability or warranty for this information). If you have questions about a medical condition or this instruction, always ask your healthcare professional. [address] any warranty or liability for your use of this information.

## 2021-03-22 NOTE — DISCHARGE SUMMARY
Discharge Summary       PATIENT ID: Nik Obregon  MRN: [id number]   YOB: 1945    DATE OF ADMISSION: 3/19/2021 12:48 AM    DATE OF DISCHARGE: 03/22/2021   PRIMARY CARE PROVIDER: Ridge Kapadia PA-C     ATTENDING PHYSICIAN: Milissa Leyden  DISCHARGING PROVIDER: Kenny Villalobos MD    To contact this individual call [phone] and ask the  to page. If unavailable ask to be transferred the Adult Hospitalist Department. CONSULTATIONS: IP CONSULT TO GASTROENTEROLOGY    PROCEDURES/SURGERIES: * No surgery found *    ADMITTING DIAGNOSES & HOSPITAL COURSE:     HPI  Patient was in her usual state of health until a couple of days ago when the patient developed nausea and vomiting. The patient has close to 10 episode of nausea and vomiting before coming to the emergency room. No associated abdominal pain. Initially, the patient stated that she has been having diarrhea, but her daughter stated that that is not true. The patient lives with her daughter. Her daughter was out of town for a couple of days, but one of her grandchildren lives in the house with her. Her daughter cut her trip short to come to the house to take care of her. When she arrived she found the patient still having a lot of nausea and vomiting and brought the patient to the emergency room for further evaluation. She has had a similar episode in the past and she was admitted to the hospital from 01/21/2016 to 01/26/2016. The nausea and vomiting was attributed to dysphagia and the dysphagia was attributed to esophageal stenosis. The patient underwent EGD and dilatation of the esophagus. Before coming to the emergency room today as well, the patient's gastroenterologist was called and they were advised to come to the emergency room as well. Hospital Course  1. Sepsis  Patient present with tachycardia, leukocytosis meeting sepsis criteria. Blood cultures were negative. Tachycardia and leukocytosis improved.   On initial evaluation patient noted to have left lower lobe airspace disease and gallbladder distention. Patient was initially treated with vancomycin and Zosyn. Later antibiotics deescalated to Zosyn only for possible aspiration pneumonia. Overall clinically improved. 2.  Nausea and vomiting  Further work-up with CT of the abdomen and pelvis showed gallbladder distention however patient is clinically improved with p.o. intake and abdominal exam is otherwise benign. GI evaluated the patient and recommended no further work-up for gallbladder distention as it does not clinically look like cholecystitis. Patient was started on Reglan for concern of gastroparesis and that improved her symptoms. 3.  CHATO  Patient initially present with abnormal creatinine due to poor p.o. intake with nausea vomiting at home. Patient was given gentle IV fluid hydration and renal function has improved. 4.  Generalized weakness  Patient evaluated by physical therapy and recommended home with home health. However family has refused home health. Patient's daughter is a CNA and she will help her with mobility. DISCHARGE DIAGNOSES / PLAN:      1. Sepsis  2. Pneumonia  3. Gallbladder distention  4. CHATO  5. Dyslipidemia  6.  History of esophageal stricture     ADDITIONAL CARE RECOMMENDATIONS:     Follow-up with PCP as scheduled    PENDING TEST RESULTS:   At the time of discharge the following test results are still pending: None    FOLLOW UP APPOINTMENTS:    Follow-up Information     Follow up With Specialties Details Why Contact Info    Julienne Harris PA-C Physician Assistant In 1 week  [address] [phone]               DIET: Cardiac Diet  Oral Nutritional Supplements: No Oral Supplement prescribed    ACTIVITY: Activity as tolerated with fall precautions    WOUND CARE: None    EQUIPMENT needed: None      DISCHARGE MEDICATIONS:  Current Discharge Medication List      START taking these medications    Details   amoxicillin-clavulanate (Augmentin) 500-125 mg per tablet Take 1 Tab by mouth two (2) times a day. Qty: 5 Tab, Refills: 0      metoclopramide HCl (Reglan) 5 mg tablet Take 1 Tab by mouth Before breakfast, lunch, dinner and at bedtime for 10 days. Qty: 120 Tab, Refills: 0         CONTINUE these medications which have NOT CHANGED    Details   ondansetron (Zofran ODT) 4 mg disintegrating tablet Take 1 Tab by mouth every eight (8) hours as needed for Nausea. Qty: 10 Tab, Refills: 0      pantoprazole (PROTONIX) 40 mg tablet Take 1 tablet by mouth twice daily before breakfast and dinner for 1 month. After 1 month, please reduce dosage to once daily before breakfast.  Qty: 60 Tab, Refills: 2      atorvastatin (LIPITOR) 80 mg tablet Take 80 mg by mouth nightly. STOP taking these medications       aspirin delayed-release 81 mg tablet Comments:   Reason for Stopping:                 NOTIFY YOUR PHYSICIAN FOR ANY OF THE FOLLOWING:   Fever over 101 degrees for 24 hours. Chest pain, shortness of breath, fever, chills, nausea, vomiting, diarrhea, change in mentation, falling, weakness, bleeding. Severe pain or pain not relieved by medications. Or, any other signs or symptoms that you may have questions about. DISPOSITION:    Home With:   OT  PT  HH  RN       Long term SNF/Inpatient Rehab    Independent/assisted living    Hospice    Other:       PATIENT CONDITION AT DISCHARGE:     Functional status    Poor     Deconditioned     Independent      Cognition     Lucid     Forgetful     Dementia      Catheters/lines (plus indication)    Phelps     PICC     PEG     None      Code status     Full code     DNR      PHYSICAL EXAMINATION AT DISCHARGE:  General:          Alert, cooperative, no distress, appears stated age.      HEENT:           Atraumatic, anicteric sclerae, pink conjunctivae                          No oral ulcers, mucosa moist, throat clear, dentition fair  Neck: Supple, symmetrical  Lungs:             Clear to auscultation bilaterally. No Wheezing or Rhonchi. No rales. Chest wall:      No tenderness  No Accessory muscle use. Heart:              Regular  rhythm,  No  murmur   No edema  Abdomen:        Soft, non-tender. Not distended. Bowel sounds normal  Extremities:     No cyanosis. No clubbing,                            Skin turgor normal, Capillary refill normal  Skin:                Not pale. Not Jaundiced  No rashes   Psych:             Not anxious or agitated.   Neurologic:      Alert, moves all extremities, answers questions appropriately and responds to commands       CHRONIC MEDICAL DIAGNOSES:  Problem List as of 3/22/2021 Date Reviewed: 3/19/2021          Codes Class Noted - Resolved    * (Principal) [facility] ([facility] ICD-10-CM: A41.9  ICD-9-CM: 038.9, 995.91  3/19/2021 - Present        Nausea and vomiting ICD-10-CM: R11.2  ICD-9-CM: 787.01  1/21/2016 - Present        Acute chest pain ICD-10-CM: R07.9  ICD-9-CM: 786.50  9/29/2014 - Present              Greater than 60 minutes were spent with the patient on counseling and coordination of care    Signed:   Albino Goldmann, MD  3/22/2021  2:29 PM

## 2021-03-22 NOTE — PROGRESS NOTES
I have reviewed discharge instructions with the patient. The patient  verbalized understanding. Patient's prescriptions provided.

## 2021-03-22 NOTE — PROGRESS NOTES
Hospital follow-up Telehealth PCP transitional care appointment has been scheduled with DEVORA Marquez for Wednesday, 3/24/21 at 1:40 p.m. Pending patient discharge.   Abdulaziz Moreno, Care Management Specialist.

## 2021-03-22 NOTE — PROGRESS NOTES
Patient is discharging home without services. Her daughter is a CNA and the patient has a good support system. Family and patient declined home health services. Her daughter will transport at 4:30 p.m. There are no other discharge needs at this time. Medicare pt has received, reviewed, and signed 2nd IM letter informing them of their right to appeal the discharge. Signed copy has been placed on pt bedside chart. [facility]

## 2021-03-22 NOTE — PROGRESS NOTES
Problem: Falls - Risk of  Goal: *Absence of Falls  Description: Document Sary Ba Fall Risk and appropriate interventions in the flowsheet. Outcome: Progressing Towards Goal  Note: Fall Risk Interventions:  Mobility Interventions: Patient to call before getting OOB         Medication Interventions: Patient to call before getting OOB, Teach patient to arise slowly    Elimination Interventions: Bed/chair exit alarm, Call light in reach              Problem: Pressure Injury - Risk of  Goal: *Prevention of pressure injury  Description: Document Osmin Scale and appropriate interventions in the flowsheet.   Outcome: Progressing Towards Goal  Note: Pressure Injury Interventions:       Moisture Interventions: Absorbent underpads, Apply protective barrier, creams and emollients    Activity Interventions: Increase time out of bed    Mobility Interventions: HOB 30 degrees or less, Pressure redistribution bed/mattress (bed type)    Nutrition Interventions: Document food/fluid/supplement intake    Friction and Shear Interventions: HOB 30 degrees or less

## 2021-03-22 NOTE — PROGRESS NOTES
Spiritual Care Assessment/Progress Note  [facility]      NAME: Rob Vargas      MRN: [id number]  AGE: 67 y.o. SEX: female  Buddhist Affiliation: Catholic   Language: English     3/22/2021     Total Time (in minutes): 14     Spiritual Assessment begun in [facility] through conversation with:         [x]Patient        [] Family    [] Friend(s)        Reason for Consult: Interdisciplinary rounds, patient floor     Spiritual beliefs: (Please include comment if needed)     [x] Identifies with a reinaldo tradition:         [] Supported by a reinaldo community:            [] Claims no spiritual orientation:           [] Seeking spiritual identity:                [] Adheres to an individual form of spirituality:           [] Not able to assess:                           Identified resources for coping:      [] Prayer                               [] Music                  [] Guided Imagery     [x] Family/friends                 [] Pet visits     [] Devotional reading                         [] Unknown     [] Other                                              Interventions offered during this visit: (See comments for more details)    Patient Interventions: Catharsis/review of pertinent events in supportive environment, Initial/Spiritual assessment, patient floor, Prayer (assurance of)           Plan of Care:     [] Support spiritual and/or cultural needs    [] Support AMD and/or advance care planning process      [] Support grieving process   [] Coordinate Rites and/or Rituals    [] Coordination with community clergy   [] No spiritual needs identified at this time   [] Detailed Plan of Care below (See Comments)  [] Make referral to Music Therapy  [] Make referral to Pet Therapy     [] Make referral to Addiction services  [] Make referral to [facility]  [] Make referral to Spiritual Care Partner  [] No future visits requested        [x] Follow up upon further referrals     Comments: Visited with Ms. Simeon for initial spiritual assessment. Pt shared some about her medical condition. She is hoping to go home soon and indicates that she has good support from her daughter. Emotional support offered and assurance of prayers. No additional needs expressed at this time.     Chaplain Erica Martinez MDiv, [facility]- [address] [place] [address] Ne

## 2021-03-22 NOTE — PROGRESS NOTES
Gastroenterology Daily Progress Note      Admit Date: 3/19/2021       Subjective: Tolerating mechanical soft diet without nausea, vomiting, abdominal pain. Current Facility-Administered Medications   Medication Dose Route Frequency    metoclopramide HCl (REGLAN) injection 5 mg  5 mg IntraVENous Q6H    polyethylene glycol (MIRALAX) packet 17 g  17 g Oral DAILY    dextrose 5% and 0.9% NaCl infusion  75 mL/hr IntraVENous CONTINUOUS    sodium chloride (NS) flush 5-40 mL  5-40 mL IntraVENous Q8H    sodium chloride (NS) flush 5-40 mL  5-40 mL IntraVENous PRN    pantoprazole (PROTONIX) 40 mg in 0.9% sodium chloride 10 mL injection  40 mg IntraVENous Q12H    sodium chloride (NS) flush 5-40 mL  5-40 mL IntraVENous Q8H    sodium chloride (NS) flush 5-40 mL  5-40 mL IntraVENous PRN    acetaminophen (TYLENOL) tablet 650 mg  650 mg Oral Q6H PRN    Or    acetaminophen (TYLENOL) suppository 650 mg  650 mg Rectal Q6H PRN    polyethylene glycol (MIRALAX) packet 17 g  17 g Oral DAILY PRN    promethazine (PHENERGAN) tablet 12.5 mg  12.5 mg Oral Q6H PRN    Or    ondansetron (ZOFRAN) injection 4 mg  4 mg IntraVENous Q6H PRN    heparin (porcine) injection 5,000 Units  5,000 Units SubCUTAneous Q8H    L.acidophilus-paracasei-S.thermophil-bifidobacter (RISAQUAD) 8 billion cell capsule  1 Cap Oral DAILY    piperacillin-tazobactam (ZOSYN) 3.375 g in 0.9% sodium chloride (MBP/ADV) 100 mL MBP  3.375 g IntraVENous Q8H        Objective:     Visit Vitals  BP (!) 165/85 (BP 1 Location: Right upper arm, BP Patient Position: At rest)   Pulse 69   Temp 97.6 °F (36.4 °C)   Resp 18   Ht 5' 4\" (1.626 m)   Wt 58.6 kg (129 lb 3 oz)   SpO2 99%   BMI 22.18 kg/m²   Blood pressure (!) 165/85, pulse 69, temperature 97.6 °F (36.4 °C), resp. rate 18, height 5' 4\" (1.626 m), weight 58.6 kg (129 lb 3 oz), SpO2 99 %. No intake/output data recorded. No intake/output data recorded.     No intake or output data in the 24 hours ending 03/22/21 1403     Physical Exam:     General appearance: cooperative, no distress  Skin: Extremities and face reveal no rashes, jaundice or pallor  GI: Abdomen nondistended, soft, and normal active bowel sounds. Neurological:  alert and oriented. Very hard of hearing. Psychiatric: No anxiety or agitation. Labs:     Recent Results (from the past 24 hour(s))   GLUCOSE, POC    Collection Time: 03/21/21 10:44 PM   Result Value Ref Range    Glucose (POC) 121 (H) 65 - 100 mg/dL    Performed by Esteban Terry  PCT    METABOLIC PANEL, BASIC    Collection Time: 03/22/21  2:17 AM   Result Value Ref Range    Sodium 145 136 - 145 mmol/L    Potassium 3.7 3.5 - 5.1 mmol/L    Chloride 117 (H) 97 - 108 mmol/L    CO2 22 21 - 32 mmol/L    Anion gap 6 5 - 15 mmol/L    Glucose 105 (H) 65 - 100 mg/dL    BUN 17 6 - 20 MG/DL    Creatinine 0.83 0.55 - 1.02 MG/DL    BUN/Creatinine ratio 20 12 - 20      GFR est AA >60 >60 ml/min/1.73m2    GFR est non-AA >60 >60 ml/min/1.73m2    Calcium 7.9 (L) 8.5 - 10.1 MG/DL   CBC WITH AUTOMATED DIFF    Collection Time: 03/22/21  2:17 AM   Result Value Ref Range    WBC 4.7 3.6 - 11.0 K/uL    RBC 3.30 (L) 3.80 - 5.20 M/uL    HGB 10.5 (L) 11.5 - 16.0 g/dL    HCT 32.6 (L) 35.0 - 47.0 %    MCV 98.8 80.0 - 99.0 FL    MCH 31.8 26.0 - 34.0 PG    MCHC 32.2 30.0 - 36.5 g/dL    RDW 13.6 11.5 - 14.5 %    PLATELET 231 (L) 448 - 400 K/uL    MPV 10.8 8.9 - 12.9 FL    NRBC 0.0 0  WBC    ABSOLUTE NRBC 0.00 0.00 - 0.01 K/uL    NEUTROPHILS 55 32 - 75 %    LYMPHOCYTES 32 12 - 49 %    MONOCYTES 10 5 - 13 %    EOSINOPHILS 1 0 - 7 %    BASOPHILS 1 0 - 1 %    IMMATURE GRANULOCYTES 1 (H) 0.0 - 0.5 %    ABS. NEUTROPHILS 2.6 1.8 - 8.0 K/UL    ABS. LYMPHOCYTES 1.5 0.8 - 3.5 K/UL    ABS. MONOCYTES 0.5 0.0 - 1.0 K/UL    ABS. EOSINOPHILS 0.1 0.0 - 0.4 K/UL    ABS. BASOPHILS 0.0 0.0 - 0.1 K/UL    ABS. IMM.  GRANS. 0.0 0.00 - 0.04 K/UL    DF AUTOMATED     LABRCNT(wbc:2,hgb:2,hct:2,plt:2,)  Recent Labs     03/22/21  1139 03/21/21  0116 03/20/21  0306    147* 144   K 3.7 3.7 4.0   * 118* 115*   CO2 22 24 21   BUN 17 24* 33*   CREA 0.83 0.85 0.95   * 87 42*   CA 7.9* 8.1* 8.8     Recent Labs     03/20/21  0306   *   TP 6.4   ALB 2.7*   GLOB 3.7     No results for input(s): CPK, CKNDX, TROIQ in the last 72 hours. No lab exists for component: CPKMB     IMPRESSION / PLAN:  Nausea and vomiting - Resolved   - HIDA cancelled, doubt cholecystitis  - Continue PPI BID  - Tolerating mech soft diet    Gastroparesis  - Delayed emptying on 4-hr GES 2/2021  - Doubt her recent illness was a true exacerbation - no plan for Reglan at this time    GI is signing off at this time. Please call if needed again during this admission.      Pneumonia, sepsis, UTI  - Main issues - defer to hospitalist management    H/O esophageal stenosis  - Last dilated 12/2020

## 2021-03-23 ENCOUNTER — PATIENT OUTREACH (OUTPATIENT)
Dept: CASE MANAGEMENT | Age: 76
End: 2021-03-23

## 2021-03-23 NOTE — ACP (ADVANCE CARE PLANNING)
CTN spoke with daughter, Martina Trevino. There are two AMD documents on file- one dated on 1- and second 9/30/2014. She confirmed that most recent dated is current per mother's wishes: primary decision maker is Ariana Sandhu and secondary is family friend- Tyrell Pickard. Updated healthcare decision makers.

## 2021-03-23 NOTE — PROGRESS NOTES
Care Transitions Initial Follow Up Call    Call within 2 business days of discharge: Yes     Patient: Nina Valdez Patient : 1945 MRN: [id number]    Last Discharge [address]       Complaint Diagnosis Description Type Department Provider    3/19/21 vomiting Sepsis without acute organ dysfunction, due to unspecified organism ([facility] [address] . .. ED to Hosp-Admission (Discharged) (ADMIT) Adelfo Amaya MD; aMtti Juárez,... Was this an external facility discharge? No Discharge Facility: NA    Challenges to be reviewed by the provider   Additional needs identified to be addressed with provider yes  Sepsis- 2/2 Aspiration PNA. Discharged on augmentin BID for 5 doses. Has follow up with PCP on 3/24/21. Gastroparesis not acute episode- GI consulted. Has follow up with GI on 3/26/21- Dr. Elly Ag. Daughter will discuss plan for failed attempt to dilate esophagus and resumption of ASA 81 mg- discontinued. H&H = 10.5 / 32.6 on 3/22/21. Method of communication with provider : phone    Discussed [phone] related testing which was not done at this time. Test results were not done. Patient informed of results, if available? NA     Advance Care Planning:   Does patient have an Advance Directive: spoke with daughter, Mei Gonzalez. There are two AMD documents on file- one dated on 2009 and second 2014. She confirmed that most recent dated is current per mother's wishes: primary decision maker is Stephanie Andrade and secondary is family friend- Felisa Robertson. Updated healthcare decision makers. Inpatient Readmission Risk score: Unplanned Readmit Risk Score: 15    Was this a readmission?  no   Patient stated reason for the admission: NA    Patients top risk factors for readmission: ineffective coping, medical condition, support system and caregiver stress pneumonia and sepsis  Interventions to address risk factors: Communication with specialists who will assume or re-assume care of the patient's system-specific problems-GI about discharge medication Reglan and Assessment and support for treatment adherence and medication management-Gastroparesis    Care Transition Nurse (CTN) contacted the patient by telephone to perform post hospital discharge assessment. Verified name and  with daughterDana as identifiers. Provided introduction to self, and explanation of the CTN role. CTN reviewed discharge instructions, medical action plan and red flags with family who verbalized understanding. Were discharge instructions available to patient? yes. Reviewed appropriate site of care based on symptoms and resources available to patient including: Specialist and [facility]. Family given an opportunity to ask questions and does not have any further questions or concerns at this time. The family agrees to contact the PCP office for questions related to their healthcare. Medication reconciliation was performed with family, who verbalizes understanding of administration of home medications. Advised obtaining a 90-day supply of all daily and as-needed medications. Referral to Pharm D needed: no     Home Health/Outpatient orders at discharge: family declined    [address] ordered at discharge: None    Covid Risk Education    Patient has following risk factors of: pneumonia and sepsis. Education provided regarding infection prevention, and signs and symptoms of COVID-19 and when to seek medical attention with family who verbalized understanding. Discussed exposure protocols and quarantine From CDC: Are you at higher risk for severe illness?  and given an opportunity for questions and concerns. The family agrees to contact the COVID-19 hotline [phone] or PCP office for questions related to COVID-19. For more information on steps you can take to protect yourself, see CDC's How to Protect Yourself     Was patient discharged with a pulse oximeter?  NA     Discussed follow-up appointments. If no appointment was previously scheduled, appointment scheduling offered: not needed Is follow up appointment scheduled within 7 days of discharge? yes   [facility] follow up appointment(s):   Future Appointments   Date Time Provider Macario Taylori   8/31/2021  5:00 PM BJ Tamayo [address]     [facility] follow up appointment(s):   PCP- DEVORA Soriano- 3/24 at 1:40 pm- daughter called office to reschedule- she will not be home at this time and would like in office follow up vs. VV- office to call back with new appt time, etc.      GI- Dr. Colton Hirsch- VV on 3/26/21    Plan for follow-up call in 10-14 days based on severity of symptoms and risk factors. Plan for next call: follow up appointment-with PCP and GI and medication management-for Gastroparesis  CTN provided contact information for future needs. Goals Addressed                 This Visit's Progress       General     Reduce Risk of Hospitalization        3/23/21- spoke with daughter, Dia Chahal. She relays that mom is alert and oriented but she will \"eat foods that she knows she should not eat\" and will \"not tell daughter if she is vomiting\". Daughter has ordered new Nanny-Cam to be able to watch mom when she is in her room. She has one in place in the kitchen/den area. 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 Yoanna's spouse has disabilities- ADHD, Austistic, etc.  His mother helps. Svitlana Diana states she has a decreased immune system. Her son lives with them- but he does work. Mom has been eating without incidence. Daughter was also confused about orders for reglan- CTN contacted GI office- office will page team to answer. Return call from Irene Hillman with GI- they want her not to take reglan at this time. CTN spoke later with daughter, Svitlana Diana. She will stop administering.     She has a VV with Dr. Ninoska Redding on 3/26 to determine plan since procedure attempted on 3/11 was not successful- mom did not tolerate scope being passed down throat.       [employer]

## 2021-03-24 LAB
BACTERIA SPEC CULT: NORMAL
BACTERIA SPEC CULT: NORMAL
SERVICE CMNT-IMP: NORMAL
SERVICE CMNT-IMP: NORMAL

## 2021-03-29 ENCOUNTER — APPOINTMENT (OUTPATIENT)
Dept: GENERAL RADIOLOGY | Age: 76
DRG: 389 | End: 2021-03-29
Attending: EMERGENCY MEDICINE
Payer: MEDICARE

## 2021-03-29 ENCOUNTER — APPOINTMENT (OUTPATIENT)
Dept: CT IMAGING | Age: 76
DRG: 389 | End: 2021-03-29
Attending: EMERGENCY MEDICINE
Payer: MEDICARE

## 2021-03-29 ENCOUNTER — TRANSCRIBE ORDER (OUTPATIENT)
Dept: SCHEDULING | Age: 76
End: 2021-03-29

## 2021-03-29 ENCOUNTER — HOSPITAL ENCOUNTER (INPATIENT)
Age: 76
LOS: 7 days | Discharge: HOME | DRG: 389 | End: 2021-04-07
Attending: EMERGENCY MEDICINE | Admitting: HOSPITALIST
Payer: MEDICARE

## 2021-03-29 DIAGNOSIS — R11.10 VOMITING, INTRACTABILITY OF VOMITING NOT SPECIFIED, PRESENCE OF NAUSEA NOT SPECIFIED, UNSPECIFIED VOMITING TYPE: Primary | ICD-10-CM

## 2021-03-29 DIAGNOSIS — K56.41 FECAL IMPACTION (HCC): ICD-10-CM

## 2021-03-29 DIAGNOSIS — R11.2 NAUSEA & VOMITING: Primary | ICD-10-CM

## 2021-03-29 DIAGNOSIS — R05.9 COUGH: ICD-10-CM

## 2021-03-29 LAB
ALBUMIN SERPL-MCNC: 3.2 G/DL (ref 3.5–5)
ALBUMIN/GLOB SERPL: 0.9 {RATIO} (ref 1.1–2.2)
ALP SERPL-CCNC: 313 U/L (ref 45–117)
ALT SERPL-CCNC: 37 U/L (ref 12–78)
ANION GAP SERPL CALC-SCNC: 10 MMOL/L (ref 5–15)
APTT PPP: 24.5 SEC (ref 22.1–31)
AST SERPL-CCNC: 35 U/L (ref 15–37)
BASOPHILS # BLD: 0 K/UL (ref 0–0.1)
BASOPHILS NFR BLD: 1 % (ref 0–1)
BILIRUB SERPL-MCNC: 0.6 MG/DL (ref 0.2–1)
BUN SERPL-MCNC: 13 MG/DL (ref 6–20)
BUN/CREAT SERPL: 14 (ref 12–20)
CALCIUM SERPL-MCNC: 9.6 MG/DL (ref 8.5–10.1)
CHLORIDE SERPL-SCNC: 106 MMOL/L (ref 97–108)
CO2 SERPL-SCNC: 26 MMOL/L (ref 21–32)
COMMENT, HOLDF: NORMAL
CREAT SERPL-MCNC: 0.93 MG/DL (ref 0.55–1.02)
DIFFERENTIAL METHOD BLD: ABNORMAL
EOSINOPHIL # BLD: 0 K/UL (ref 0–0.4)
EOSINOPHIL NFR BLD: 0 % (ref 0–7)
ERYTHROCYTE [DISTWIDTH] IN BLOOD BY AUTOMATED COUNT: 14.4 % (ref 11.5–14.5)
GLOBULIN SER CALC-MCNC: 3.7 G/DL (ref 2–4)
GLUCOSE SERPL-MCNC: 107 MG/DL (ref 65–100)
HCT VFR BLD AUTO: 39 % (ref 35–47)
HGB BLD-MCNC: 13 G/DL (ref 11.5–16)
IMM GRANULOCYTES # BLD AUTO: 0 K/UL (ref 0–0.04)
IMM GRANULOCYTES NFR BLD AUTO: 1 % (ref 0–0.5)
INR PPP: 1.1 (ref 0.9–1.1)
LYMPHOCYTES # BLD: 1.2 K/UL (ref 0.8–3.5)
LYMPHOCYTES NFR BLD: 14 % (ref 12–49)
MCH RBC QN AUTO: 31.8 PG (ref 26–34)
MCHC RBC AUTO-ENTMCNC: 33.3 G/DL (ref 30–36.5)
MCV RBC AUTO: 95.4 FL (ref 80–99)
MONOCYTES # BLD: 0.6 K/UL (ref 0–1)
MONOCYTES NFR BLD: 7 % (ref 5–13)
NEUTS SEG # BLD: 6.7 K/UL (ref 1.8–8)
NEUTS SEG NFR BLD: 77 % (ref 32–75)
NRBC # BLD: 0 K/UL (ref 0–0.01)
NRBC BLD-RTO: 0 PER 100 WBC
PLATELET # BLD AUTO: 239 K/UL (ref 150–400)
PMV BLD AUTO: 10.4 FL (ref 8.9–12.9)
POTASSIUM SERPL-SCNC: 3.4 MMOL/L (ref 3.5–5.1)
PROT SERPL-MCNC: 6.9 G/DL (ref 6.4–8.2)
PROTHROMBIN TIME: 11.5 SEC (ref 9–11.1)
RBC # BLD AUTO: 4.09 M/UL (ref 3.8–5.2)
SAMPLES BEING HELD,HOLD: NORMAL
SODIUM SERPL-SCNC: 142 MMOL/L (ref 136–145)
THERAPEUTIC RANGE,PTTT: NORMAL SECS (ref 58–77)
WBC # BLD AUTO: 8.6 K/UL (ref 3.6–11)

## 2021-03-29 PROCEDURE — 71045 X-RAY EXAM CHEST 1 VIEW: CPT

## 2021-03-29 PROCEDURE — 80053 COMPREHEN METABOLIC PANEL: CPT

## 2021-03-29 PROCEDURE — 99285 EMERGENCY DEPT VISIT HI MDM: CPT

## 2021-03-29 PROCEDURE — 74177 CT ABD & PELVIS W/CONTRAST: CPT

## 2021-03-29 PROCEDURE — 74011250636 HC RX REV CODE- 250/636: Performed by: EMERGENCY MEDICINE

## 2021-03-29 PROCEDURE — 85730 THROMBOPLASTIN TIME PARTIAL: CPT

## 2021-03-29 PROCEDURE — 74011000636 HC RX REV CODE- 636: Performed by: EMERGENCY MEDICINE

## 2021-03-29 PROCEDURE — 99218 HC RM OBSERVATION: CPT

## 2021-03-29 PROCEDURE — 85025 COMPLETE CBC W/AUTO DIFF WBC: CPT

## 2021-03-29 PROCEDURE — 36415 COLL VENOUS BLD VENIPUNCTURE: CPT

## 2021-03-29 PROCEDURE — 85610 PROTHROMBIN TIME: CPT

## 2021-03-29 RX ORDER — ACETAMINOPHEN 650 MG/1
650 SUPPOSITORY RECTAL
Status: DISCONTINUED | OUTPATIENT
Start: 2021-03-29 | End: 2021-04-07 | Stop reason: HOSPADM

## 2021-03-29 RX ORDER — ONDANSETRON 2 MG/ML
4 INJECTION INTRAMUSCULAR; INTRAVENOUS
Status: DISCONTINUED | OUTPATIENT
Start: 2021-03-29 | End: 2021-04-07 | Stop reason: HOSPADM

## 2021-03-29 RX ORDER — SODIUM CHLORIDE 0.9 % (FLUSH) 0.9 %
5-40 SYRINGE (ML) INJECTION EVERY 8 HOURS
Status: DISCONTINUED | OUTPATIENT
Start: 2021-03-29 | End: 2021-04-07 | Stop reason: HOSPADM

## 2021-03-29 RX ORDER — SODIUM CHLORIDE 0.9 % (FLUSH) 0.9 %
5-40 SYRINGE (ML) INJECTION AS NEEDED
Status: DISCONTINUED | OUTPATIENT
Start: 2021-03-29 | End: 2021-04-07 | Stop reason: HOSPADM

## 2021-03-29 RX ORDER — POLYETHYLENE GLYCOL 3350 17 G/17G
17 POWDER, FOR SOLUTION ORAL DAILY
Status: DISCONTINUED | OUTPATIENT
Start: 2021-03-30 | End: 2021-03-31

## 2021-03-29 RX ORDER — FACIAL-BODY WIPES
10 EACH TOPICAL DAILY PRN
Status: DISCONTINUED | OUTPATIENT
Start: 2021-03-29 | End: 2021-04-07 | Stop reason: HOSPADM

## 2021-03-29 RX ORDER — PROMETHAZINE HYDROCHLORIDE 25 MG/1
12.5 TABLET ORAL
Status: DISCONTINUED | OUTPATIENT
Start: 2021-03-29 | End: 2021-04-07 | Stop reason: HOSPADM

## 2021-03-29 RX ORDER — ACETAMINOPHEN 325 MG/1
650 TABLET ORAL
Status: DISCONTINUED | OUTPATIENT
Start: 2021-03-29 | End: 2021-04-07 | Stop reason: HOSPADM

## 2021-03-29 RX ADMIN — SODIUM CHLORIDE 1000 ML: 9 INJECTION, SOLUTION INTRAVENOUS at 19:32

## 2021-03-29 RX ADMIN — IOPAMIDOL 100 ML: 755 INJECTION, SOLUTION INTRAVENOUS at 19:32

## 2021-03-29 NOTE — ED PROVIDER NOTES
HPI the patient was discharged 1 week ago after admission for sepsis, left-sided pneumonia, persistent vomiting and CHATO. She comes in today with continued vomiting and her daughter says that this morning she vomited up brown material that could have been coffee grounds. She also has a history of intermittent abdominal pain. The daughter says that the patient has had recent constipation and low back pain. There is no history of fever, shortness of breath or chest pain. Past Medical History:   Diagnosis Date    Arthritis     Deaf, nonspeaking     High cholesterol     Inner ear dysfunction        Past Surgical History:   Procedure Laterality Date    HX HYSTERECTOMY           History reviewed. No pertinent family history.     Social History     Socioeconomic History    Marital status: SINGLE     Spouse name: Not on file    Number of children: Not on file    Years of education: Not on file    Highest education level: Not on file   Occupational History    Not on file   Social Needs    Financial resource strain: Not on file    Food insecurity     Worry: Not on file     Inability: Not on file    Transportation needs     Medical: Not on file     Non-medical: Not on file   Tobacco Use    Smoking status: Never Smoker    Smokeless tobacco: Never Used   Substance and Sexual Activity    Alcohol use: No    Drug use: No    Sexual activity: Not on file   Lifestyle    Physical activity     Days per week: Not on file     Minutes per session: Not on file    Stress: Not on file   Relationships    Social connections     Talks on phone: Not on file     Gets together: Not on file     Attends [employer] service: Not on file     Active member of club or organization: Not on file     Attends meetings of clubs or organizations: Not on file     Relationship status: Not on file    Intimate partner violence     Fear of current or ex partner: Not on file     Emotionally abused: Not on file     Physically abused: Not on file     Forced sexual activity: Not on file   Other Topics Concern    Not on file   Social History Narrative    Not on file         ALLERGIES: Patient has no known allergies. Review of Systems   Unable to perform ROS: Other       Vitals:    03/29/21 1528 03/29/21 1901 03/29/21 1902   BP: 109/71 131/69 131/69   Pulse: 99  79   Resp: 16  18   Temp: 97 °F (36.1 °C)  98.6 °F (37 °C)   SpO2: 98% 97% 99%   Weight: 59 kg (130 lb)     Height: 5' 4\" (1.626 m)              Physical Exam  Constitutional:       General: She is not in acute distress. Appearance: She is well-developed. HENT:      Head: Normocephalic. Neck:      Musculoskeletal: Normal range of motion. Cardiovascular:      Rate and Rhythm: Normal rate. Heart sounds: No murmur. Pulmonary:      Effort: Pulmonary effort is normal.      Breath sounds: Normal breath sounds. Abdominal:      General: There is distension. Palpations: Abdomen is soft. Tenderness: There is no abdominal tenderness. Musculoskeletal: Normal range of motion. Skin:     General: Skin is warm and dry. Capillary Refill: Capillary refill takes less than 2 seconds. Neurological:      Mental Status: She is alert. Psychiatric:         Behavior: Behavior normal.          MDM       Procedures    Perfect Serve Consult for Admission  8:25 PM    ED Room Number: ER09/09  Patient Name and age:  Benita Chery 67 y.o.  female  Working Diagnosis:   1. Vomiting, intractability of vomiting not specified, presence of nausea not specified, unspecified vomiting type    2. Fecal impaction ([address]        COVID-19 Suspicion:  no  Sepsis present:  no  Reassessment needed: no  Code Status:  Full Code  Readmission: yes  Isolation Requirements:  no  Recommended Level of Care:  med/surg  Department:[facility] Adult ED - 21   Other: Patient discharged 1 week ago for pneumonia, sepsis and vomiting. Now with continued persistent vomiting and constipation.   CT shows massive fecal impaction throughout the entire colon.

## 2021-03-29 NOTE — ED NOTES
Bedside and Verbal shift change report given to Mayra Troy (oncoming nurse) by Venu Hughes (offgoing nurse). Report included the following information ED Summary.

## 2021-03-29 NOTE — ED TRIAGE NOTES
Patient was discharged from here one week ago. Per patients daughter patients has continued to vomit and this morning her emesis looked like coffee ground emesis.  Patient is also c/o right lower back pain

## 2021-03-30 LAB
GLUCOSE BLD STRIP.AUTO-MCNC: 67 MG/DL (ref 65–100)
SERVICE CMNT-IMP: NORMAL

## 2021-03-30 PROCEDURE — 74011000250 HC RX REV CODE- 250: Performed by: FAMILY MEDICINE

## 2021-03-30 PROCEDURE — 97161 PT EVAL LOW COMPLEX 20 MIN: CPT

## 2021-03-30 PROCEDURE — 74011250637 HC RX REV CODE- 250/637: Performed by: FAMILY MEDICINE

## 2021-03-30 PROCEDURE — 96374 THER/PROPH/DIAG INJ IV PUSH: CPT

## 2021-03-30 PROCEDURE — 74011250636 HC RX REV CODE- 250/636: Performed by: FAMILY MEDICINE

## 2021-03-30 PROCEDURE — 82962 GLUCOSE BLOOD TEST: CPT

## 2021-03-30 PROCEDURE — 96375 TX/PRO/DX INJ NEW DRUG ADDON: CPT

## 2021-03-30 PROCEDURE — 74011250637 HC RX REV CODE- 250/637: Performed by: HOSPITALIST

## 2021-03-30 PROCEDURE — 97535 SELF CARE MNGMENT TRAINING: CPT

## 2021-03-30 PROCEDURE — 97165 OT EVAL LOW COMPLEX 30 MIN: CPT

## 2021-03-30 PROCEDURE — C9113 INJ PANTOPRAZOLE SODIUM, VIA: HCPCS | Performed by: FAMILY MEDICINE

## 2021-03-30 PROCEDURE — 74011250636 HC RX REV CODE- 250/636: Performed by: HOSPITALIST

## 2021-03-30 PROCEDURE — 99218 HC RM OBSERVATION: CPT

## 2021-03-30 PROCEDURE — 97116 GAIT TRAINING THERAPY: CPT

## 2021-03-30 PROCEDURE — 96376 TX/PRO/DX INJ SAME DRUG ADON: CPT

## 2021-03-30 RX ORDER — SODIUM CHLORIDE AND POTASSIUM CHLORIDE .9; .15 G/100ML; G/100ML
SOLUTION INTRAVENOUS CONTINUOUS
Status: DISCONTINUED | OUTPATIENT
Start: 2021-03-30 | End: 2021-03-31

## 2021-03-30 RX ORDER — AMOXICILLIN 250 MG
2 CAPSULE ORAL DAILY
Status: DISCONTINUED | OUTPATIENT
Start: 2021-03-30 | End: 2021-04-07 | Stop reason: HOSPADM

## 2021-03-30 RX ORDER — MAGNESIUM CITRATE
296 SOLUTION, ORAL ORAL
Status: COMPLETED | OUTPATIENT
Start: 2021-03-30 | End: 2021-03-30

## 2021-03-30 RX ADMIN — MAGNESIUM CITRATE 296 ML: 1.75 LIQUID ORAL at 12:39

## 2021-03-30 RX ADMIN — SODIUM CHLORIDE 40 MG: 9 INJECTION INTRAMUSCULAR; INTRAVENOUS; SUBCUTANEOUS at 00:59

## 2021-03-30 RX ADMIN — POLYETHYLENE GLYCOL 3350 17 G: 17 POWDER, FOR SOLUTION ORAL at 09:06

## 2021-03-30 RX ADMIN — Medication 10 ML: at 00:59

## 2021-03-30 RX ADMIN — ONDANSETRON 4 MG: 2 INJECTION INTRAMUSCULAR; INTRAVENOUS at 13:20

## 2021-03-30 RX ADMIN — SODIUM CHLORIDE 40 MG: 9 INJECTION INTRAMUSCULAR; INTRAVENOUS; SUBCUTANEOUS at 09:06

## 2021-03-30 RX ADMIN — Medication 10 ML: at 13:49

## 2021-03-30 RX ADMIN — SODIUM CHLORIDE 40 MG: 9 INJECTION INTRAMUSCULAR; INTRAVENOUS; SUBCUTANEOUS at 20:42

## 2021-03-30 RX ADMIN — POTASSIUM CHLORIDE AND SODIUM CHLORIDE: 900; 150 INJECTION, SOLUTION INTRAVENOUS at 09:08

## 2021-03-30 RX ADMIN — DOCUSATE SODIUM 50 MG AND SENNOSIDES 8.6 MG 2 TABLET: 8.6; 5 TABLET, FILM COATED ORAL at 12:38

## 2021-03-30 RX ADMIN — BISACODYL 10 MG: 10 SUPPOSITORY RECTAL at 09:07

## 2021-03-30 NOTE — PROGRESS NOTES
Occupational Therapy:  03/30/21    Orders received and appreciated, chart reviewed, spoke to RN. Pt currently getting a suppository at bedside due to fecal impaction. Will defer and follow up later this AM vs. This afternoon.      Thank you,  Donny Gallagher, OTR/L

## 2021-03-30 NOTE — ED NOTES
Pt soap enema delayed due to Dr Candace Wagoner to perform Fecal decompaction. Provider contacted RN at this time.

## 2021-03-30 NOTE — H&P
[address] [facility] Adult  Hospitalist Group  History and Physical    Primary Care Provider: Onesimo Anthony  Date of Service:  3/29/2021    Subjective:     Jennifer Hood is a 67 y.o. female with hx deafness, dyslipidemia, and arthritis who presents with   . She was just hospitalized for left-sided pneumonia, intractable vomiting, and CHATO. She presents with continued vomiting of possible coffee ground material.  Sx are associated with intermittent abdominal pain, and obstipation. Per daughter, she was giving patient increased doses of miralax with no BM. Patient had a prior episode of     In the ED, VSS. Labs were grossly unremarkable. CT abdomen/pelvis showed rectal fecal impaction which was unchanged from 3/19/21, stercoral proctitis, small volume ascites, and mild right hydronephrosis        In the ED, she rec'd 1Lns     Review of Systems:    Review of systems not obtained due to patient factors. Past Medical History:   Diagnosis Date    Arthritis     Deaf, nonspeaking     High cholesterol     Inner ear dysfunction       Past Surgical History:   Procedure Laterality Date    HX HYSTERECTOMY       Prior to Admission medications    Medication Sig Start Date End Date Taking? Authorizing Provider   amoxicillin-clavulanate (Augmentin) 500-125 mg per tablet Take 1 Tab by mouth two (2) times a day. 3/22/21   Darleen Pearson MD   metoclopramide HCl (Reglan) 5 mg tablet Take 1 Tab by mouth Before breakfast, lunch, dinner and at bedtime for 10 days. 3/22/21 4/1/21  Darleen Pearson MD   ondansetron (Zofran ODT) 4 mg disintegrating tablet Take 1 Tab by mouth every eight (8) hours as needed for Nausea. 12/16/20   Toma Horton MD   pantoprazole (PROTONIX) 40 mg tablet Take 1 tablet by mouth twice daily before breakfast and dinner for 1 month.  After 1 month, please reduce dosage to once daily before breakfast. 1/26/16   Aria Kruger MD   atorvastatin (LIPITOR) 80 mg tablet Take 80 mg by mouth nightly. Other, MD Myriam     No Known Allergies   History reviewed. No pertinent family history. SOCIAL HISTORY:  Patient resides at Home. Patient ambulates with cane  Smoking history: none  Alcohol history: none    Objective:       Physical Exam:   Visit Vitals  BP (!) 144/77   Pulse 79   Temp 98.6 °F (37 °C)   Resp 18   Ht 5' 4\" (1.626 m)   Wt 59 kg (130 lb)   SpO2 99%   BMI 22.31 kg/m²     General:  Alert, cooperative, no distress, appears stated age. Head:  Normocephalic, without obvious abnormality, atraumatic. Eyes:  Conjunctivae/corneas clear. EOMs intact. Ears:  Hearing Loss   Nose: Nares normal. Septum midline. Mucosa normal. No drainage or sinus tenderness. Throat: Lips, mucosa, and tongue normal.    Neck: Supple, symmetrical, trachea midline   Back:   Symmetric, no curvature. ROM normal.    Lungs:   Clear to auscultation bilaterally. Chest wall:  No tenderness or deformity. Heart:  Regular rate and rhythm, S1, S2 normal, no murmur, click, rub or gallop. Abdomen:   Soft, non-tender. Bowel sounds normal.        Rectal:  Normal tone,  no masses or tenderness  Guaiac negative stool. Extremities: Extremities normal, atraumatic, no cyanosis or edema. Pulses: 2+ radial pulses. Skin: Skin color, texture, turgor normal. No rashes or lesions. Data Review: All diagnostic labs and studies have been reviewed. Assessment:     Active Problems:    * No active hospital problems. *      Plan:     #Fecal Impaction  -rectal fecal impaction with diffuse constipation noted on CT  -disimpaction followed by soap suds enema, miralax  #GIB  -c/o coffee ground emesis after multiple vomiting in hospital and post discharge, ? Ken Deck  -hemoccult    #GERD  -continue home PPI    #Dyslipidemia  -continue home lipitor, and ASA    FEN: NPO  dvt ppx: SCD  MPOA Em Stoll (daughter)> [phone]  Code: Full    FUNCTIONAL STATUS PRIOR TO HOSPITALIZATION Ambulatory with Use of Assistive Devices     Signed By: Jeremi Martins MD     March 29, 2021

## 2021-03-30 NOTE — PROGRESS NOTES
[address] [facility] Adult  Hospitalist Group                                                                                          Hospitalist Progress Note  Vitaly Restrepo MD  Answering service: [phone] OR 2024 from in house phone        Date of Service:  3/30/2021  NAME:  Jordyn Cole  :  1945  MRN:  [id number]    This documentation was facilitated by a Voice Recognition software and may contain inadvertent typographical errors. Admission Summary:   Patient was brought to the ED with nausea, vomiting and lower abdominal pain. She was recently hospitalized here from 3/193/22 when she was treated for sepsis secondary to pneumonia and CHATO. Interval history / Subjective:        I have seen and examined patient along with RN at the bedside. Patient complains of abdominal discomfort. Patient with severe constipation. Digital rectal disimpaction with small soft stool but unsuccessful as patient became very uncomfortable. Assessment & Plan:   Severe constipation. · CAT scan of the abdomen pelvis showed rectal fecal impaction, diffuse constipation with associated stercoral proctitis and a small volume of ascites, mild right hydronephrosis secondary to rectal fecal impaction. · Rectal digital disimpaction with small soft stool but unsuccessful since patient became very uncomfortable  · Intensify bowel regimen: MiraLAX, Colace, bisacodyl suppository, citrate of magnesia  · Enema. · GI consult, she may need endoscopic disimpaction before fails    Mild obstructive right hydronephrosis due to constipation. · We will reassess with renal ultrasound once constipation resolves    ? Coffee-ground emesis: Monitor hemoglobin daily, IV PPI. Mild hypokalemia: Continue to replace and monitor  History of dysphagia, esophageal stenosis status post dilatation in 2020. GI recommended n.p.o. and speech eval once she starts taking p.o. Recent sepsis secondary to pneumonia.   Chest x-ray on admission showed improvement of the airspace disease      Code status: Full code  DVT prophylaxis: SCD  Care Plan discussed with: Patient/Family and Nurse   Care plan discussed with daughter on the phone  Anticipated Disposition: Home w/Family  Anticipated Discharge: Greater than 48 hours  Hospital Problems  Date Reviewed: 3/19/2021          Codes Class Noted POA    Intractable vomiting ICD-10-CM: R11.10  ICD-9-CM: 536.2  3/29/2021 Unknown        Fecal impaction ([address] ICD-10-CM: K56.41  ICD-9-CM: 560.32  3/29/2021 Unknown                Review of Systems:   A comprehensive review of systems was negative except for that written in the HPI. Vital Signs:    Last 24hrs VS reviewed since prior progress note. Most recent are:  Visit Vitals  BP (!) 158/75 (BP 1 Location: Right arm, BP Patient Position: At rest)   Pulse 85   Temp 98.1 °F (36.7 °C)   Resp 16   Ht 5' 4\" (1.626 m)   Wt 59 kg (130 lb)   SpO2 98%   BMI 22.31 kg/m²       No intake or output data in the 24 hours ending 03/30/21 1624     Physical Examination:     I had a face to face encounter with this patient and independently examined them on3/30/2021 as outlined below:      I was chaperoned by nurse Emmanuelle Schultz during this physical exam  Constitutional:  Appears uncomfortable. Not in distress   HEENT:  Atraumatic. Oral mucosa moist,. Non icteric sclera. No pallor. Resp:  CTA bilaterally. No wheezing/rhonchi/rales. No accessory muscle use   Chest Wall: No deformity   CV:  Regular rhythm, normal rate, no murmurs, gallops, rubs    GI:  Abdomen soft, normoactive, nontender. Digital rectal exam with the rectum full of soft stool, with small blood at the end of the exam, patient had external hemorrhoids, sphincters are intact.     :  No CVA or suprapubic tenderness    Musculoskeletal:  No edema, warm, 2+ pulses throughout    Neurologic:  Mental status:AAOx3,   Cranial nerves II-XII : WNL  Motor exam:Moves all extremities symmetrically              Data Review: Review and/or order of clinical lab test  Review and/or order of tests in the radiology section of CPT  Review and/or order of tests in the medicine section of CPT      Labs:     Recent Labs     03/29/21  1540   WBC 8.6   HGB 13.0   HCT 39.0        Recent Labs     03/29/21  1540      K 3.4*      CO2 26   BUN 13   CREA 0.93   *   CA 9.6     Recent Labs     03/29/21  1540   ALT 37   *   TBILI 0.6   TP 6.9   ALB 3.2*   GLOB 3.7     Recent Labs     03/29/21  1540   INR 1.1   PTP 11.5*   APTT 24.5      No results for input(s): FE, TIBC, PSAT, FERR in the last 72 hours. No results found for: FOL, RBCF   No results for input(s): PH, PCO2, PO2 in the last 72 hours. No results for input(s): CPK, CKNDX, TROIQ in the last 72 hours.     No lab exists for component: CPKMB  Lab Results   Component Value Date/Time    Cholesterol, total 134 09/30/2014 05:00 AM    HDL Cholesterol 74 09/30/2014 05:00 AM    LDL, calculated 54.2 09/30/2014 05:00 AM    Triglyceride 29 09/30/2014 05:00 AM    CHOL/HDL Ratio 1.8 09/30/2014 05:00 AM     Lab Results   Component Value Date/Time    Glucose (POC) 67 03/30/2021 11:13 AM    Glucose (POC) 121 (H) 03/21/2021 10:44 PM    Glucose (POC) 92 03/20/2021 09:08 PM    Glucose (POC) 70 03/20/2021 05:29 AM    Glucose (POC) 51 (L) 03/20/2021 05:10 AM     Lab Results   Component Value Date/Time    Color YELLOW/STRAW 03/19/2021 02:12 AM    Appearance CLEAR 03/19/2021 02:12 AM    Specific gravity <1.005 03/19/2021 02:12 AM    Specific gravity 1.021 12/16/2020 01:48 PM    pH (UA) 6.0 03/19/2021 02:12 AM    Protein Negative 03/19/2021 02:12 AM    Glucose Negative 03/19/2021 02:12 AM    Ketone Negative 03/19/2021 02:12 AM    Bilirubin Negative 03/19/2021 02:12 AM    Urobilinogen 1.0 03/19/2021 02:12 AM    Nitrites Negative 03/19/2021 02:12 AM    Leukocyte Esterase SMALL (A) 03/19/2021 02:12 AM    Epithelial cells FEW 03/19/2021 02:12 AM    Bacteria 1+ (A) 03/19/2021 02:12 AM    WBC 20-50 03/19/2021 02:12 AM    RBC 10-20 03/19/2021 02:12 AM         Medications Reviewed:     Current Facility-Administered Medications   Medication Dose Route Frequency    0.9% sodium chloride with KCl 20 mEq/L infusion   IntraVENous CONTINUOUS    senna-docusate (PERICOLACE) 8.6-50 mg per tablet 2 Tab  2 Tab Oral DAILY    sodium chloride (NS) flush 5-40 mL  5-40 mL IntraVENous Q8H    sodium chloride (NS) flush 5-40 mL  5-40 mL IntraVENous PRN    acetaminophen (TYLENOL) tablet 650 mg  650 mg Oral Q6H PRN    Or    acetaminophen (TYLENOL) suppository 650 mg  650 mg Rectal Q6H PRN    polyethylene glycol (MIRALAX) packet 17 g  17 g Oral DAILY    promethazine (PHENERGAN) tablet 12.5 mg  12.5 mg Oral Q6H PRN    Or    ondansetron (ZOFRAN) injection 4 mg  4 mg IntraVENous Q6H PRN    pantoprazole (PROTONIX) 40 mg in 0.9% sodium chloride 10 mL injection  40 mg IntraVENous Q12H    bisacodyL (DULCOLAX) suppository 10 mg  10 mg Rectal DAILY PRN     ______________________________________________________________________  EXPECTED LENGTH OF STAY: - - -  ACTUAL LENGTH OF STAY:          0                 Evie Castleman, MD

## 2021-03-30 NOTE — PROGRESS NOTES
Problem: Self Care Deficits Care Plan (Adult)  Goal: *Acute Goals and Plan of Care (Insert Text)  Description:   FUNCTIONAL STATUS PRIOR TO ADMISSION: Patient was modified independent using a single point cane for functional mobility, or furniture walking in the home. Patient was modified independent for basic and instrumental ADLs. HOME SUPPORT: The patient lived with dtr and JALEESA but did not require assist.    Occupational Therapy Goals  Initiated 3/30/2021  1. Patient will perform grooming, standing at sink, with modified independence within 7 day(s). 2.  Patient will perform lower body dressing with modified independence within 7 day(s). 3.  Patient will perform simple home management task with supervision/set-up within 7 day(s). 4.  Patient will perform toilet transfers and all aspects of toileting with modified independence within 7 day(s). 5.  Patient will participate in upper extremity therapeutic exercise/activities with modified independence for 5 minutes within 7 day(s). 6.  Patient will utilize energy conservation techniques during functional activities with verbal cues within 7 day(s). Outcome: Progressing Towards Goal     OCCUPATIONAL THERAPY EVALUATION  Patient: Henrietta Duane (45 y.o. female)  Date: 3/30/2021  Primary Diagnosis: Intractable vomiting [R11.10]  Fecal impaction (HCC) [K56.41]        Precautions:  Fall(very [place])    ASSESSMENT  Based on the objective data described below, the patient presents with generally decreased strength impacting ADL performance and mobility following admission for vomiting and fecal impaction. Pt with recent hospitalization for sepsis and was able to return home with family support. Pt is pleasant and most limited by severe [place], but is alert and oriented. She offers excellent efforts and is able to perform serial ADL tasks with overall CGA.  Pt requires increased cues/assistance for safety with RW in bathroom and tends to leave it off to the side to furniture walk. She demonstrates excellent ROM to manage distal LB dressing tasks and performs toileting hygiene with supervision/SBA. Overall, anticipate pt is near her baseline and will only require a few additional OT sessions in acute setting. Recommend return home with continued family support. Current Level of Function Impacting Discharge (ADLs/self-care): overall set up to [address] for ADLs    Functional Outcome Measure: The patient scored Total: 55/100 on the Barthel Index outcome measure which is indicative of 45% impaired ability to care for basic self needs/dependency on others; inferred 50% dependency on others for instrumental ADLs. Other factors to consider for discharge: lives with family; very [address]; L knee OA (but not limiting to participation)     Patient will benefit from skilled therapy intervention to address the above noted impairments. PLAN :  Recommendations and Planned Interventions: self care training, functional mobility training, therapeutic exercise, balance training, therapeutic activities, endurance activities, patient education, home safety training and family training/education    Frequency/Duration: Patient will be followed by occupational therapy 3 times a week to address goals. Recommendation for discharge: (in order for the patient to meet his/her long term goals)  No skilled occupational therapy/ follow up rehabilitation needs identified at this time. This discharge recommendation:  Has been made in collaboration with the attending provider and/or case management    IF patient discharges home will need the following DME: pt owns needed DME; may benefit from shower chair       SUBJECTIVE:   Patient stated I even get down into the tub still.     OBJECTIVE DATA SUMMARY:   HISTORY:   Past Medical History:   Diagnosis Date    Arthritis     Deaf, nonspeaking     High cholesterol     Inner ear dysfunction      Past Surgical History:   Procedure Laterality Date    HX HYSTERECTOMY         Expanded or extensive additional review of patient history:     Home Situation  Home Environment: Private residence  Wheelchair Ramp: Yes  One/Two Story Residence: One story  Living Alone: No  Support Systems: Family member(s)(lives with daughter and son-in-law)  Patient Expects to be Discharged to[de-identified] Private residence  Current DME Used/Available at Home: [place], straight  Tub or Shower Type: Tub/Shower combination    Hand dominance: Right    EXAMINATION OF PERFORMANCE DEFICITS:  Cognitive/Behavioral Status:  Neurologic State: Alert  Orientation Level: Oriented X4  Cognition: Appropriate for age attention/concentration; Follows commands(limited by severe [place]  Perception: Appears intact(very [place]  Perseveration: No perseveration noted  Safety/Judgement: Awareness of environment; Fall prevention; Insight into deficits    Range of Motion:  AROM: Within functional limits    Strength:  Strength: Generally decreased, functional    Coordination:  Coordination: Within functional limits  Fine Motor Skills-Upper: Left Intact; Right Intact    Gross Motor Skills-Upper: Left Intact; Right Intact    Tone & Sensation:  Tone: Normal  Sensation: Intact    Balance:  Sitting: Intact  Standing: Intact; With support    Functional Mobility and Transfers for ADLs:  Bed Mobility:  Supine to Sit: Supervision  Sit to Supine: (pt seated up in chair at end of session)  Scooting: Supervision    Transfers:  Sit to Stand: Contact guard assistance;Assist x1  Stand to Sit: Contact guard assistance;Assist x1  Toilet Transfer : Contact guard assistance    ADL Assessment:  Feeding: Independent    Oral Facial Hygiene/Grooming: Contact guard assistance(for standing tasks at sink)    Bathing: Contact guard assistance(infer for standing tasks)    Upper Body Dressing: Setup    Lower Body Dressing: Stand-by assistance;Contact guard assistance(able to tailor sit)    Toileting: Supervision;Stand by assistance    ADL Intervention and task modifications:  Grooming  Grooming Assistance: Contact guard assistance  Position Performed: Standing(at sink with rolling walker; cues for safety with RW)  Washing Hands: Stand-by assistance;Contact guard assistance    Lower Body Dressing Assistance  Underpants: Contact guard assistance  Socks: Supervision  Slip on Shoes with Back: Supervision  Leg Crossed Method Used: Yes  Position Performed: Seated edge of bed  Cues: Don;Verbal cues provided    Toileting  Bladder Hygiene: Supervision(seated)  Bowel Hygiene: Stand-by assistance(seated)  Clothing Management: Contact guard assistance  Cues: Tactile cues provided;Verbal cues provided  Adaptive Equipment: Grab bars; Walker    Cognitive Retraining  Safety/Judgement: Awareness of environment; Fall prevention; Insight into deficits    Instructed patient to increase time sitting OOB in chair for pulmonary hygiene, skin integrity, and to increase endurance in preparation for ADLs. Instructed patient to sit OOB for all meals. Patient verbalized understanding of same. Educated patient about importance of keeping hands free at all times when using rolling walker for fall prevention. Instructed patient on safe and appropriate hand placement during transfers (push up from sitting surface when standing up, reach back for sitting surface when sitting down, use grab bars, etc.), including never to pull up on rolling walker for fall prevention and safety. Instructed patient to push rolling walker up to surface (countertop, sink, etc.) and  it as opposed to abandoning rolling walker on the side for safety. Patient verbalized understanding of same. Functional Measure:  Barthel Index:    Bathin  Bladder: 5  Bowels: 5  Groomin  Dressin  Feeding: 10  Mobility: 10  Stairs: 0  Toilet Use: 5  Transfer (Bed to Chair and Back): 10  Total: 55/100        The Barthel ADL Index: Guidelines  1.  The index should be used as a record of what a patient does, not as a record of what a patient could do. 2. The main aim is to establish degree of independence from any help, physical or verbal, however minor and for whatever reason. 3. The need for supervision renders the patient not independent. 4. A patient's performance should be established using the best available evidence. Asking the patient, friends/relatives and nurses are the usual sources, but direct observation and common sense are also important. However direct testing is not needed. 5. Usually the patient's performance over the preceding 24-48 hours is important, but occasionally longer periods will be relevant. 6. Middle categories imply that the patient supplies over 50 per cent of the effort. 7. Use of aids to be independent is allowed. Charly Truong., Barthel, D.W. ([id number]). Functional evaluation: the Barthel Index. [address] [facility] (14)2. FRED Dale, Thomas Almonte., Juanjo Root., [place], [address] [place] [address] (1999). Measuring the change indisability after inpatient rehabilitation; comparison of the responsiveness of the Barthel Index and Functional [place] Measure. Journal of Neurology, Neurosurgery, and Psychiatry, 66(4), 522-118. Jake Nguyễn, N.J.A, JUDY Quinonez, & Sophie Patel M.A. (2004.) Assessment of post-stroke quality of life in cost-effectiveness studies: The usefulness of the Barthel Index and the EuroQoL-5D. Quality of Life Research, 15, 426-65     Occupational Therapy Evaluation Charge Determination   History Examination Decision-Making   LOW Complexity : Brief history review  LOW Complexity : 1-3 performance deficits relating to physical, cognitive , or psychosocial skils that result in activity limitations and / or participation restrictions  MEDIUM Complexity : Patient may present with comorbidities that affect occupational performnce.  Miniml to moderate modification of tasks or assistance (eg, physical or verbal ) with assesment(s) is necessary to enable patient to complete evaluation Based on the above components, the patient evaluation is determined to be of the following complexity level: LOW   Pain Rating:  Pt reporting minimal pain    Activity Tolerance:   Good and SpO2 stable on RA    After treatment patient left in no apparent distress:    Sitting in chair and Call bell within reach    COMMUNICATION/EDUCATION:   The patients plan of care was discussed with: Physical therapist and Registered nurse. Home safety education was provided and the patient/caregiver indicated understanding., Patient/family have participated as able in goal setting and plan of care. and Patient/family agree to work toward stated goals and plan of care. This patients plan of care is appropriate for delegation to [facility].     Thank you for this referral.  Jammie Esteban OT  Time Calculation: 22 mins

## 2021-03-30 NOTE — PROGRESS NOTES
Primary Nurse Lorie Doherty RN and [facility] RN performed a dual skin assessment on this patient No impairment noted  Osmin score is 21.

## 2021-03-30 NOTE — CONSULTS
[facility].  [address] [facility] [address] Yony Saenz, [address]  [phone]                     GI CONSULTATION NOTE      NAME:  Ester Palm   :   1945   MRN:   [id number]     Consult Date: 3/30/2021     Chief Complaint: constipation, N/V    History of Present Illness:  Patient is a 67 y.o. WF who is seen in consultation at the request of Dr. Mason Bee for the above-mentioned problems. She was recently discharged 3/22 after hospitalization for N/V, PNA and sepsis. Since discharge, it appears she has not had full BM. Reports urge to defecate but unable to pass stools. Attempted to get it out but was unsuccessful due to perianal discomfort. She was initially tolerating soft foods and fluids (Ensure) but found it progressively more difficult given abdominal discomfort and nausea. She denies difficulty swallowing. She could not confirm daughters report on admission of dark emesis but agreed she had some vomiting. C/o intermittent sharp mid-abdominal pain lasting but also states she is coming along pretty good.  She was given SSE x 2 in ED without success. She had only few sips of magnesium citrate before c/o nausea. RN confirms small amount clear vomit after magnesium citrate. Zofran given  Of note, pt hard of hearing with bilateral hearing aids, reading lips. PMH:  Past Medical History:   Diagnosis Date    Arthritis     Deaf, nonspeaking     High cholesterol     Inner ear dysfunction        PSH:  Past Surgical History:   Procedure Laterality Date    HX HYSTERECTOMY         Allergies:  No Known Allergies    Home Medications:  Prior to Admission Medications   Prescriptions Last Dose Informant Patient Reported? Taking?   amoxicillin-clavulanate (Augmentin) 500-125 mg per tablet   No No   Sig: Take 1 Tab by mouth two (2) times a day. atorvastatin (LIPITOR) 80 mg tablet   Yes No   Sig: Take 80 mg by mouth nightly.    metoclopramide HCl (Reglan) 5 mg tablet   No No   Sig: Take 1 Tab by mouth Before breakfast, lunch, dinner and at bedtime for 10 days. ondansetron (Zofran ODT) 4 mg disintegrating tablet   No No   Sig: Take 1 Tab by mouth every eight (8) hours as needed for Nausea. pantoprazole (PROTONIX) 40 mg tablet   No No   Sig: Take 1 tablet by mouth twice daily before breakfast and dinner for 1 month. After 1 month, please reduce dosage to once daily before breakfast.      Facility-Administered Medications: None       Hospital Medications:  Current Facility-Administered Medications   Medication Dose Route Frequency    0.9% sodium chloride with KCl 20 mEq/L infusion   IntraVENous CONTINUOUS    senna-docusate (PERICOLACE) 8.6-50 mg per tablet 2 Tab  2 Tab Oral DAILY    sodium chloride (NS) flush 5-40 mL  5-40 mL IntraVENous Q8H    sodium chloride (NS) flush 5-40 mL  5-40 mL IntraVENous PRN    acetaminophen (TYLENOL) tablet 650 mg  650 mg Oral Q6H PRN    Or    acetaminophen (TYLENOL) suppository 650 mg  650 mg Rectal Q6H PRN    polyethylene glycol (MIRALAX) packet 17 g  17 g Oral DAILY    promethazine (PHENERGAN) tablet 12.5 mg  12.5 mg Oral Q6H PRN    Or    ondansetron (ZOFRAN) injection 4 mg  4 mg IntraVENous Q6H PRN    pantoprazole (PROTONIX) 40 mg in 0.9% sodium chloride 10 mL injection  40 mg IntraVENous Q12H    bisacodyL (DULCOLAX) suppository 10 mg  10 mg Rectal DAILY PRN       Social History:  Social History     Tobacco Use    Smoking status: Never Smoker    Smokeless tobacco: Never Used   Substance Use Topics    Alcohol use: No       Family History:  History reviewed. No pertinent family history.     Review of Systems:    Constitutional: negative fever, negative chills, negative weight loss  Eyes:   negative visual changes  ENT:   negative sore throat, tongue or lip swelling  Respiratory:  negative cough, negative dyspnea  Cards:  negative for chest pain, palpitations, lower extremity edema  GI:   See HPI  Musculoskel: negative for myalgias,  back pain and muscle weakness  Neuro: negative for headaches, dizziness, vertigo  Psych:  negative for feelings of anxiety, depression      Objective:     Patient Vitals for the past 8 hrs:   BP Temp Pulse Resp SpO2   03/30/21 1351 (!) 158/75 98.1 °F (36.7 °C) 85 16 98 %   03/30/21 0859 121/67 97.4 °F (36.3 °C) 73 18 99 %     No intake/output data recorded. No intake/output data recorded. PHYSICAL EXAM:  General appearance: cooperative, no distress,   Skin:  No rashes. HEENT: Sclerae anicteric. Bilateral HA, [place]   Cardiovascular: Regular rate and rhythm. Respiratory: Comfortable breathing,  no wheezes, rales, or rhonchi. GI: Abdomen slightly distended, soft, mild TTP. Hypoactive bowel sounds. Rectal: Deferred   Neurological:  Patient is alert and oriented. Psychiatric: Mood appropriate. No anxiety or agitation. Data Review     Recent Labs     03/29/21  1540   WBC 8.6   HGB 13.0   HCT 39.0        Recent Labs     03/29/21  1540      K 3.4*      CO2 26   BUN 13   CREA 0.93   *   CA 9.6     Recent Labs     03/29/21  1540   *   TP 6.9   ALB 3.2*   GLOB 3.7     Recent Labs     03/29/21  1540   INR 1.1   PTP 11.5*   APTT 24.5        Imaging studies reviewed    Assessment / Plan :     Fecal impaction:   CT A/P with contrast 3/29: fecal impaction persistent from 3/19. 9.5 x 9.5 x 14cm stool ball in rectum; diffuse constipation; associated stercoral procitis and small volume ascites. New finding of right hydronephrosis related to fecal impaction. Attempt at manual disimpaction this am unsuccessful  only small amount soft stool per RN. One dose of Miralax also ineffective.    She will need vigorous bowel regimen per rectum to relieve this significant impaction.    - Soap suds enema every 4 hours   - Manual disimpaction one hour after administration of enema  - Can restart magnesium citrate if nausea improves with Zofran; hold if not tolerated    N/V:   Reportedly vomited brown colored material (?CGE) prior to admission. Per RN small volume clear emesis after starting mag citrate. H/o LA grade C esophagitis on EGD 12/2020 - on BID PPI. Today Hgb 13.0 (3/22 10.5). H/O gastroparesis on GES 02/15/21 but less likely cause of sx; suspect related to significant constipation  - Continue ondansetron prn. Consider prior to laxative   - Continue NPO   - Continue BID PPI    H/O esophageal stenosis:   s/p dilation 12/2020. Currently denying dysphagia but seen in our office 03/26 with c/o coughing during eating  plan for outpatient MBS. Has had PEG in the past.  - NPO  - Can consider SLP evaluation as inpatient if necessary once taking po     Patient Active Hospital Problem List:   Active Problems:    Intractable vomiting (3/29/2021)      Fecal impaction ([facility] [address] (3/29/2021)    I have personally reviewed the history and independently examined the patient. I have reviewed the chart and agree with the documentation recorded by the Mid Level Provider, including the assessment, treatment plan, and disposition.       Damaris Loving MD

## 2021-03-30 NOTE — ED NOTES
RN Henok Garcia contacted pt daughter Austin Quintana contacted for pt Room number on 5W.     Austin Quintana number  [phone]

## 2021-03-30 NOTE — ROUTINE PROCESS
TRANSFER - OUT REPORT: 
 
Verbal report given to RN Araceli(name) on Christina Patiño  being transferred to [facility](unit) for routine progression of care Report consisted of patients Situation, Background, Assessment and  
Recommendations(SBAR). Information from the following report(s) SBAR, Kardex and ED Summary was reviewed with the receiving nurse. Lines:  
Peripheral IV 03/29/21 Left Antecubital (Active) Site Assessment Clean, dry, & intact 03/29/21 1904 Phlebitis Assessment 0 03/29/21 1904 Infiltration Assessment 0 03/29/21 1904 Dressing Status Clean, dry, & intact 03/29/21 1904 Opportunity for questions and clarification was provided. Patient transported with: 
 [place]

## 2021-03-30 NOTE — PROGRESS NOTES
Problem: Mobility Impaired (Adult and Pediatric)  Goal: *Acute Goals and Plan of Care (Insert Text)  Description: FUNCTIONAL STATUS PRIOR TO ADMISSION: Patient was modified independent using a single point cane when out in the community or furniture surfer at home for functional mobility. HOME SUPPORT PRIOR TO ADMISSION: The patient lived with her daughter and son-in-law with no assistance needed for mobility. Physical Therapy Goals  Initiated 3/30/2021  1. Patient will move from supine to sit and sit to supine  in bed with independence within 7 day(s). 2.  Patient will transfer from bed to chair and chair to bed with modified independence using the least restrictive device within 7 day(s). 3.  Patient will perform sit to stand with modified independence within 7 day(s). 4.  Patient will ambulate with modified independence for 150 feet with the least restrictive device within 7 day(s). Outcome: Progressing Towards Goal   PHYSICAL THERAPY EVALUATION  Patient: Jordyn Cole (23 y.o. female)  Date: 3/30/2021  Primary Diagnosis: Intractable vomiting [R11.10]  Fecal impaction (HCC) [K56.41]        Precautions: fall         ASSESSMENT  Based on the objective data described below, the patient presents with decreased generalized strength, balance, and minimally impaired functional mobility below her baseline. Pt was recently hospitalized 1 week ago for sepsis, L sided pneumonia, CHATO, and vomiting. Pt readmitted with persistent vomiting and fecal impaction. Pt is [place] and had difficulty obtaining detailed information from pt. She is alert and oriented and reports she uses a single point cane when out in the community but holds onto furniture, etc within her home. Pt did well with mobility overall demonstrating bed mobility with supervision, transfers with CGA, and ambulation with a rolling walker for a short distance with CGA.   Pt with no complaints however pointed out that she has swelling of her L knee due to OA but no report of pain at this time. Pt remained up in chair at session end. Anticipate pt will progress well and is likely near her baseline. Recommend trying [facility] tomorrow. Current Level of Function Impacting Discharge (mobility/balance): bed mobility supervision, transfers CGA, ambulation with rolling walker x 80 feet with CGA    Functional Outcome Measure: The patient scored Total Score: 25/28 on the Tinetti outcome measure which is indicative of low fall risk. Other factors to consider for discharge: lives with family     Patient will benefit from skilled therapy intervention to address the above noted impairments. PLAN :  Recommendations and Planned Interventions: bed mobility training, transfer training, gait training, and patient and family training/education      Frequency/Duration: Patient will be followed by physical therapy:  5 times a week to address goals. Recommendation for discharge: (in order for the patient to meet his/her long term goals)  To be determined: pending progress     This discharge recommendation:  Has not yet been discussed the attending provider and/or case management    IF patient discharges home will need the following DME: none likely, will continue to assess pending progress          SUBJECTIVE:   Patient stated Let me show you something(L knee).     OBJECTIVE DATA SUMMARY:   HISTORY:    Past Medical History:   Diagnosis Date    Arthritis     Deaf, nonspeaking     High cholesterol     Inner ear dysfunction      Past Surgical History:   Procedure Laterality Date    HX HYSTERECTOMY         Personal factors and/or comorbidities impacting plan of care: [facility]    Home Situation  Home Environment: Private residence  Wheelchair Ramp: Yes  One/Two Story Residence: One story  Living Alone: No  Support Systems: Family member(s)(lives with daughter and son-in-law)  Patient Expects to be Discharged to[de-identified] Private residence  Current DME Used/Available at Home: Cane, straight  Tub or Shower Type: Tub/Shower combination    EXAMINATION/PRESENTATION/DECISION MAKING:   Critical Behavior:     Orientation Level: Oriented X4        Hearing:   [place]      Range Of Motion:  AROM: Within functional limits                       Strength:    Strength: Generally decreased, functional                    Tone & Sensation:   Tone: Normal              Sensation: Intact               Coordination:  Coordination: Within functional limits       Functional Mobility:  Bed Mobility:     Supine to Sit: Supervision     Scooting: Supervision  Transfers:  Sit to Stand: Contact guard assistance;Assist x1  Stand to Sit: Contact guard assistance;Assist x1                       Balance:   Sitting: Intact  Standing: Intact; With support  Ambulation/Gait Training:  Distance (ft): 80 Feet (ft)  Assistive Device: Gait belt;Walker, rolling  Ambulation - Level of Assistance: Assist x1;Contact guard assistance        Gait Abnormalities: Decreased step clearance              Speed/Yadira: Slow                     Functional Measure:  Tinetti test:    Sitting Balance: 1  Arises: 2  Attempts to Rise: 2  Immediate Standing Balance: 2  Standing Balance: 2  Nudged: 1  Eyes Closed: 1  Turn 360 Degrees - Continuous/Discontinuous: 1  Turn 360 Degrees - Steady/Unsteady: 1  Sitting Down: 2  Balance Score: 15 Balance total score  Indication of Gait: 1  R Step Length/Height: 1  L Step Length/Height: 1  R Foot Clearance: 1  L Foot Clearance: 1  Step Symmetry: 1  Step Continuity: 1  Path: 1  Trunk: 1  Walking Time: 1  Gait Score: 10 Gait total score  Total Score: 25/28 Overall total score         Tinetti Tool Score Risk of Falls  <19 = High Fall Risk  19-24 = Moderate Fall Risk  25-28 = Low Fall Risk  Tinetti ME. Performance-Oriented Assessment of Mobility Problems in Elderly Patients. Lopez 65; [id number].  (Scoring Description: PT Bulletin Feb. 10, 1993)    Older adults: Cristiano Wan et al, 2009; n = [address] elderly evaluated with ABC, JOHN, ADL, and IADL)  · Mean JOHN score for males aged 69-68 years = 26.21(3.40)  · Mean JOHN score for females age 69-68 years = 25.16(4.30)  · Mean JOHN score for males over 80 years = 23.29(6.02)  · Mean JOHN score for females over 80 years = 17.20(8.32)        Physical Therapy Evaluation Charge Determination   History Examination Presentation Decision-Making   MEDIUM  Complexity : 1-2 comorbidities / personal factors will impact the outcome/ POC  LOW Complexity : 1-2 Standardized tests and measures addressing body structure, function, activity limitation and / or participation in recreation  LOW Complexity : Stable, uncomplicated  LOW Complexity : FOTO score of       Based on the above components, the patient evaluation is determined to be of the following complexity level: LOW     Pain Rating:  Verbal: no complaints     Activity Tolerance:   Good      After treatment patient left in no apparent distress:   Sitting in chair and Call bell within reach    COMMUNICATION/EDUCATION:   The patients plan of care was discussed with: Registered nurse. Fall prevention education was provided and the patient/caregiver indicated understanding. and Patient/family agree to work toward stated goals and plan of care.     Thank you for this referral.  Erica Vela   Time Calculation: 24 mins

## 2021-03-30 NOTE — PROGRESS NOTES
MARITZA PLAN:  RUR-Observation:  Disposition-Home with daughter  Transportation-Family  F/U-PCP/Specialist as appropriate     Patient is [facility]    Observation notice provided in writing to patient and/or caregiver as well as verbal explanation of the policy. Patients who are in outpatient status also receive the Observation notice. Patient awaiting PT/OT Evaluations    Reason for Admission:  Fecal Impaction, constipation, coffee ground emesis,                      RUR Score:  Obs- Patient was discharged on 3/22/21 from [address] [place] [address] for utilizing home health:    TBD      PCP: First and Last name:  Sussy Navarro PA-C     Name of Practice:    Are you a current patient: Yes/No: Yes   Approximate date of last visit:  Last year. Appt for 4/12/21 for yearly physical   Can you participate in a virtual visit with your PCP: Yes with daughter                    Current Advanced Directive/Advance Care Plan: Full Code      Healthcare Decision Maker: Ian Goldstein  Click here to complete Userscout including selection of the Userscout Relationship (ie \"Primary\")             Primary Decision MakerGlpaige Lopez - Daughter - [phone]    Secondary Decision Maker: Madelaine Calzada - [phone]                  Transition of Care Plan:    CM met with patient to discuss discharge planning and Obs status. Patient did not have any questions and signed the Medicare Obs Letters. [facility] but answered most of my questions. CM also called daughter Lxeie Calhoun to verify information. Patient lives with her daughter in their apartment with one step. She is independent with her ADLs and uses a cane for safe ambulation when she takes her walk outside of the apt. Daughter transports patient to her appointments and did not voice any discharge barriers. CM will continue to follow for any discharge needs that may arise.    Lily Simon, VINNY, RN, CRM  Care Management Interventions  PCP Verified by CM: Yes  Palliative Care Criteria Met (RRAT>21 & CHF Dx)?: No  Mode of Transport at Discharge:  Other (see comment)  Transition of Care Consult (CM Consult): Discharge Planning  MyChart Signup: No  Discharge Durable Medical Equipment: No  Health Maintenance Reviewed: Yes  Physical Therapy Consult: Yes  Speech Therapy Consult: No  Confirm Follow Up Transport: Family  Discharge Location  Discharge Placement: Home with family assistance

## 2021-03-31 ENCOUNTER — APPOINTMENT (OUTPATIENT)
Dept: GENERAL RADIOLOGY | Age: 76
DRG: 389 | End: 2021-03-31
Attending: PHYSICIAN ASSISTANT
Payer: MEDICARE

## 2021-03-31 LAB
ANION GAP SERPL CALC-SCNC: 8 MMOL/L (ref 5–15)
BASOPHILS # BLD: 0.1 K/UL (ref 0–0.1)
BASOPHILS NFR BLD: 1 % (ref 0–1)
BUN SERPL-MCNC: 15 MG/DL (ref 6–20)
BUN/CREAT SERPL: 21 (ref 12–20)
CALCIUM SERPL-MCNC: 8 MG/DL (ref 8.5–10.1)
CHLORIDE SERPL-SCNC: 111 MMOL/L (ref 97–108)
CO2 SERPL-SCNC: 24 MMOL/L (ref 21–32)
CREAT SERPL-MCNC: 0.71 MG/DL (ref 0.55–1.02)
DIFFERENTIAL METHOD BLD: ABNORMAL
EOSINOPHIL # BLD: 0 K/UL (ref 0–0.4)
EOSINOPHIL NFR BLD: 0 % (ref 0–7)
ERYTHROCYTE [DISTWIDTH] IN BLOOD BY AUTOMATED COUNT: 14.5 % (ref 11.5–14.5)
GLUCOSE BLD STRIP.AUTO-MCNC: 118 MG/DL (ref 65–100)
GLUCOSE BLD STRIP.AUTO-MCNC: 125 MG/DL (ref 65–100)
GLUCOSE BLD STRIP.AUTO-MCNC: 172 MG/DL (ref 65–100)
GLUCOSE BLD STRIP.AUTO-MCNC: 44 MG/DL (ref 65–100)
GLUCOSE BLD STRIP.AUTO-MCNC: 60 MG/DL (ref 65–100)
GLUCOSE BLD STRIP.AUTO-MCNC: 75 MG/DL (ref 65–100)
GLUCOSE BLD STRIP.AUTO-MCNC: 85 MG/DL (ref 65–100)
GLUCOSE SERPL-MCNC: 47 MG/DL (ref 65–100)
HCT VFR BLD AUTO: 36.5 % (ref 35–47)
HGB BLD-MCNC: 11.6 G/DL (ref 11.5–16)
IMM GRANULOCYTES # BLD AUTO: 0 K/UL (ref 0–0.04)
IMM GRANULOCYTES NFR BLD AUTO: 0 % (ref 0–0.5)
LYMPHOCYTES # BLD: 1.2 K/UL (ref 0.8–3.5)
LYMPHOCYTES NFR BLD: 17 % (ref 12–49)
MCH RBC QN AUTO: 31.6 PG (ref 26–34)
MCHC RBC AUTO-ENTMCNC: 31.8 G/DL (ref 30–36.5)
MCV RBC AUTO: 99.5 FL (ref 80–99)
MONOCYTES # BLD: 0.5 K/UL (ref 0–1)
MONOCYTES NFR BLD: 6 % (ref 5–13)
NEUTS SEG # BLD: 5.5 K/UL (ref 1.8–8)
NEUTS SEG NFR BLD: 75 % (ref 32–75)
NRBC # BLD: 0 K/UL (ref 0–0.01)
NRBC BLD-RTO: 0 PER 100 WBC
PLATELET # BLD AUTO: 149 K/UL (ref 150–400)
PMV BLD AUTO: 10.7 FL (ref 8.9–12.9)
POTASSIUM SERPL-SCNC: 4.5 MMOL/L (ref 3.5–5.1)
RBC # BLD AUTO: 3.67 M/UL (ref 3.8–5.2)
SERVICE CMNT-IMP: ABNORMAL
SERVICE CMNT-IMP: NORMAL
SERVICE CMNT-IMP: NORMAL
SODIUM SERPL-SCNC: 143 MMOL/L (ref 136–145)
WBC # BLD AUTO: 7.3 K/UL (ref 3.6–11)

## 2021-03-31 PROCEDURE — 99218 HC RM OBSERVATION: CPT

## 2021-03-31 PROCEDURE — 74011250637 HC RX REV CODE- 250/637: Performed by: FAMILY MEDICINE

## 2021-03-31 PROCEDURE — 82962 GLUCOSE BLOOD TEST: CPT

## 2021-03-31 PROCEDURE — 74011250637 HC RX REV CODE- 250/637: Performed by: PHYSICIAN ASSISTANT

## 2021-03-31 PROCEDURE — 74018 RADEX ABDOMEN 1 VIEW: CPT

## 2021-03-31 PROCEDURE — 74011250636 HC RX REV CODE- 250/636: Performed by: FAMILY MEDICINE

## 2021-03-31 PROCEDURE — 74011000250 HC RX REV CODE- 250: Performed by: NURSE PRACTITIONER

## 2021-03-31 PROCEDURE — 96375 TX/PRO/DX INJ NEW DRUG ADDON: CPT

## 2021-03-31 PROCEDURE — 96376 TX/PRO/DX INJ SAME DRUG ADON: CPT

## 2021-03-31 PROCEDURE — 97530 THERAPEUTIC ACTIVITIES: CPT

## 2021-03-31 PROCEDURE — C9113 INJ PANTOPRAZOLE SODIUM, VIA: HCPCS | Performed by: FAMILY MEDICINE

## 2021-03-31 PROCEDURE — 74011000250 HC RX REV CODE- 250: Performed by: FAMILY MEDICINE

## 2021-03-31 PROCEDURE — 74011250636 HC RX REV CODE- 250/636: Performed by: HOSPITALIST

## 2021-03-31 PROCEDURE — 74011000258 HC RX REV CODE- 258: Performed by: HOSPITALIST

## 2021-03-31 PROCEDURE — 97116 GAIT TRAINING THERAPY: CPT

## 2021-03-31 PROCEDURE — 80048 BASIC METABOLIC PNL TOTAL CA: CPT

## 2021-03-31 PROCEDURE — 85025 COMPLETE CBC W/AUTO DIFF WBC: CPT

## 2021-03-31 PROCEDURE — 65270000032 HC RM SEMIPRIVATE

## 2021-03-31 PROCEDURE — 36415 COLL VENOUS BLD VENIPUNCTURE: CPT

## 2021-03-31 PROCEDURE — 74011250637 HC RX REV CODE- 250/637: Performed by: HOSPITALIST

## 2021-03-31 RX ORDER — MAGNESIUM CITRATE
296 SOLUTION, ORAL ORAL
Status: COMPLETED | OUTPATIENT
Start: 2021-03-31 | End: 2021-03-31

## 2021-03-31 RX ORDER — MAGNESIUM SULFATE 100 %
4 CRYSTALS MISCELLANEOUS AS NEEDED
Status: DISCONTINUED | OUTPATIENT
Start: 2021-03-31 | End: 2021-04-07 | Stop reason: HOSPADM

## 2021-03-31 RX ORDER — DEXTROSE MONOHYDRATE AND SODIUM CHLORIDE 5; .9 G/100ML; G/100ML
75 INJECTION, SOLUTION INTRAVENOUS CONTINUOUS
Status: DISPENSED | OUTPATIENT
Start: 2021-03-31 | End: 2021-04-01

## 2021-03-31 RX ORDER — POLYETHYLENE GLYCOL 3350 17 G/17G
17 POWDER, FOR SOLUTION ORAL 2 TIMES DAILY
Status: DISCONTINUED | OUTPATIENT
Start: 2021-03-31 | End: 2021-04-07 | Stop reason: HOSPADM

## 2021-03-31 RX ORDER — DEXTROSE 50 % IN WATER (D50W) INTRAVENOUS SYRINGE
12.5-25 AS NEEDED
Status: DISCONTINUED | OUTPATIENT
Start: 2021-03-31 | End: 2021-04-07 | Stop reason: HOSPADM

## 2021-03-31 RX ADMIN — Medication 10 ML: at 02:08

## 2021-03-31 RX ADMIN — POLYETHYLENE GLYCOL 3350 17 G: 17 POWDER, FOR SOLUTION ORAL at 08:38

## 2021-03-31 RX ADMIN — DEXTROSE MONOHYDRATE 25 G: 25 INJECTION, SOLUTION INTRAVENOUS at 05:00

## 2021-03-31 RX ADMIN — MAGNESIUM CITRATE 296 ML: 1.75 LIQUID ORAL at 13:59

## 2021-03-31 RX ADMIN — SODIUM CHLORIDE 40 MG: 9 INJECTION INTRAMUSCULAR; INTRAVENOUS; SUBCUTANEOUS at 08:39

## 2021-03-31 RX ADMIN — SODIUM CHLORIDE 40 MG: 9 INJECTION INTRAMUSCULAR; INTRAVENOUS; SUBCUTANEOUS at 22:08

## 2021-03-31 RX ADMIN — DOCUSATE SODIUM 50 MG AND SENNOSIDES 8.6 MG 2 TABLET: 8.6; 5 TABLET, FILM COATED ORAL at 08:38

## 2021-03-31 RX ADMIN — DEXTROSE MONOHYDRATE 25 G: 25 INJECTION, SOLUTION INTRAVENOUS at 11:16

## 2021-03-31 RX ADMIN — DEXTROSE AND SODIUM CHLORIDE 75 ML/HR: 5; 900 INJECTION, SOLUTION INTRAVENOUS at 23:18

## 2021-03-31 RX ADMIN — Medication 10 ML: at 22:08

## 2021-03-31 RX ADMIN — POTASSIUM CHLORIDE AND SODIUM CHLORIDE: 900; 150 INJECTION, SOLUTION INTRAVENOUS at 01:54

## 2021-03-31 RX ADMIN — POLYETHYLENE GLYCOL 3350 17 G: 17 POWDER, FOR SOLUTION ORAL at 20:13

## 2021-03-31 RX ADMIN — DEXTROSE AND SODIUM CHLORIDE 75 ML/HR: 5; 900 INJECTION, SOLUTION INTRAVENOUS at 08:51

## 2021-03-31 NOTE — PROGRESS NOTES
[address]  [address]  E Kristie Bauer, [address]  [phone]                     GI PROGRESS NOTE    Patient Name: Bobbi Smith      : 1945      MRN: [id number]  Admit Date: 3/29/2021  Today's Date: 3/31/2021    Subjective:     She had repeated enemas followed by manual disimpaction yesterday and overnight, and today states that she passed stool \"all night\" and is feeling much better. She denies nausea and vomiting and abdominal pain. Her rectum is sore - stercoral proctitis on CT and then repeated manipulation, so this is expected. Objective:     Blood pressure 136/71, pulse 66, temperature 98 °F (36.7 °C), resp. rate 16, height 5' 4\" (1.626 m), weight 59 kg (130 lb), SpO2 98 %. Physical Exam:  General appearance: cooperative, no distress  HEENT: Sclerae anicteric. Extra-occular muscles are intact. GI: Abdomen nondistended, soft  Musculoskeletal: No edema of the lower legs. Neurological:  alert and oriented. Psychiatric: No anxiety or agitation. Data Review:    Recent Results (from the past 24 hour(s))   CBC WITH AUTOMATED DIFF    Collection Time: 21  3:11 AM   Result Value Ref Range    WBC 7.3 3.6 - 11.0 K/uL    RBC 3.67 (L) 3.80 - 5.20 M/uL    HGB 11.6 11.5 - 16.0 g/dL    HCT 36.5 35.0 - 47.0 %    MCV 99.5 (H) 80.0 - 99.0 FL    MCH 31.6 26.0 - 34.0 PG    MCHC 31.8 30.0 - 36.5 g/dL    RDW 14.5 11.5 - 14.5 %    PLATELET 825 (L) 022 - 400 K/uL    MPV 10.7 8.9 - 12.9 FL    NRBC 0.0 0  WBC    ABSOLUTE NRBC 0.00 0.00 - 0.01 K/uL    NEUTROPHILS 75 32 - 75 %    LYMPHOCYTES 17 12 - 49 %    MONOCYTES 6 5 - 13 %    EOSINOPHILS 0 0 - 7 %    BASOPHILS 1 0 - 1 %    IMMATURE GRANULOCYTES 0 0.0 - 0.5 %    ABS. NEUTROPHILS 5.5 1.8 - 8.0 K/UL    ABS. LYMPHOCYTES 1.2 0.8 - 3.5 K/UL    ABS. MONOCYTES 0.5 0.0 - 1.0 K/UL    ABS. EOSINOPHILS 0.0 0.0 - 0.4 K/UL    ABS. BASOPHILS 0.1 0.0 - 0.1 K/UL    ABS. IMM.  GRANS. 0.0 0.00 - 0.04 K/UL    DF AUTOMATED METABOLIC PANEL, BASIC    Collection Time: 03/31/21  3:11 AM   Result Value Ref Range    Sodium 143 136 - 145 mmol/L    Potassium 4.5 3.5 - 5.1 mmol/L    Chloride 111 (H) 97 - 108 mmol/L    CO2 24 21 - 32 mmol/L    Anion gap 8 5 - 15 mmol/L    Glucose 47 (LL) 65 - 100 mg/dL    BUN 15 6 - 20 MG/DL    Creatinine 0.71 0.55 - 1.02 MG/DL    BUN/Creatinine ratio 21 (H) 12 - 20      GFR est AA >60 >60 ml/min/1.73m2    GFR est non-AA >60 >60 ml/min/1.73m2    Calcium 8.0 (L) 8.5 - 10.1 MG/DL   GLUCOSE, POC    Collection Time: 03/31/21  4:43 AM   Result Value Ref Range    Glucose (POC) 44 (LL) 65 - 100 mg/dL    Performed by Dorys Juárez, POC    Collection Time: 03/31/21  5:27 AM   Result Value Ref Range    Glucose (POC) 172 (H) 65 - 100 mg/dL    Performed by Dorys Juárez, POC    Collection Time: 03/31/21  7:27 AM   Result Value Ref Range    Glucose (POC) 85 65 - 100 mg/dL    Performed by Dorys Juárez, POC    Collection Time: 03/31/21 11:10 AM   Result Value Ref Range    Glucose (POC) 60 (L) 65 - 100 mg/dL    Performed by Sade Henriquez    GLUCOSE, POC    Collection Time: 03/31/21 11:37 AM   Result Value Ref Range    Glucose (POC) 118 (H) 65 - 100 mg/dL    Performed by Sade Henriquez        Assessment / Plan :     Fecal impaction:   - CT A/P with contrast 3/29: fecal impaction persistent from 3/19. 9.5 x 9.5 x 14cm stool ball in rectum; diffuse constipation; associated stercoral procitis and small volume ascites.  New finding of right hydronephrosis related to fecal impaction.   - Feeling better post Q4 enema + disimpaction  - KUB 3/31 - Decreased volume of stool within the rectosigmoid with persistent large  volume of stool within the right and transverse colon  - Increase Miralax to BID  - Bottle of Mag Citrate today  - Allow clears, advance once her bowels are cleaned out      N/V prior to admission:   - suspect related to significant constipation  - H/o LA grade C esophagitis on EGD 12/2020 - on BID PPI. Today Hgb 13.0 (3/22 10.5). - H/o gastroparesis on GES 02/15/21 but less likely cause of sx;   - Continue ondansetron prn     H/o esophageal stenosis:   - s/p dilation 12/2020. Currently denying dysphagia  - Can consider SLP evaluation as inpatient if necessary once taking po     Patient Active Hospital Problem List:   Active Problems:    Intractable vomiting (3/29/2021)      Fecal impaction ([facility] [address] (3/29/2021)      I have personally reviewed the history and independently examined the patient. I have reviewed the chart and agree with the documentation recorded by the Mid Level Provider, including the assessment, treatment plan, and disposition.     Sravani Guillaume MD

## 2021-03-31 NOTE — PROGRESS NOTES
[facility] Adult  Hospitalist Group                                                                                          Hospitalist Progress Note  Lemon Holter, MD  Answering service: [phone] OR [phone] from in house phone        Date of Service:  3/31/2021  NAME:  Taran Barragan  :  1945  MRN:  [id number]    This documentation was facilitated by a Voice Recognition software and may contain inadvertent typographical errors. Admission Summary:   Patient was brought to the ED with nausea, vomiting and lower abdominal pain. She was recently hospitalized here from 3/193/22 when she was treated for sepsis secondary to pneumonia and CHATO. Interval history / Subjective:        , I saw her earlier, she denied nausea or abdominal vomiting. She has had frequent BMs last night, she told me she has been going through the night. Assessment & Plan:   Severe constipation. · CAT scan of the abdomen pelvis showed rectal fecal impaction, diffuse constipation with associated stercoral proctitis and a small volume of ascites, mild right hydronephrosis secondary to rectal fecal impaction. · Rectal digital disimpaction with small soft stool but unsuccessful since patient became very uncomfortable  · Intensify bowel regimen: MiraLAX, Colace, bisacodyl suppository, citrate of magnesia  · Enema. · She is now moving her bowels. KUB showed decreased volume of stool within the rectal sigmoid with persistent large volume of stool within the right and transverse colon  · Start full liquid diet, added Ensure    Mild obstructive right hydronephrosis due to constipation. · We will reassess with renal ultrasound once constipation resolves    ? Coffee-ground emesis: Monitor hemoglobin daily, IV PPI.     Hypoglycemia earlier as she was n.p.o.  Start full liquid diet, added Ensure    Mild hypokalemia: Continue to replace and monitor  History of dysphagia, esophageal stenosis status post dilatation in 12/2020, per daughter, she has only been eating liquid/soft foods at home.  -Full liquid diet. Add Ensure      Recent sepsis secondary to pneumonia. Chest x-ray on admission showed improvement of the airspace disease      Code status: Full code  DVT prophylaxis: SCD  Care Plan discussed with: Patient/Family and Nurse   Care plan discussed with daughter on the phone  Anticipated Disposition: Home w/Family  Anticipated Discharge: Greater than 48 hours  Hospital Problems  Date Reviewed: 3/19/2021          Codes Class Noted POA    Intractable vomiting ICD-10-CM: R11.10  ICD-9-CM: 536.2  3/29/2021 Unknown        Fecal impaction ([facility] [address] ICD-10-CM: K56.41  ICD-9-CM: 560.32  3/29/2021 Unknown                Review of Systems:   A comprehensive review of systems was negative except for that written in the HPI. Vital Signs:    Last 24hrs VS reviewed since prior progress note. Most recent are:  Visit Vitals  /80 (BP 1 Location: Left arm, BP Patient Position: At rest)   Pulse 92   Temp 98 °F (36.7 °C)   Resp 18   Ht 5' 4\" (1.626 m)   Wt 59 kg (130 lb)   SpO2 98%   BMI 22.31 kg/m²       No intake or output data in the 24 hours ending 03/31/21 1414     Physical Examination:     I had a face to face encounter with this patient and independently examined them on3/31/2021 as outlined below:        Constitutional:  Appears uncomfortable. Not in distress   HEENT:  Atraumatic. Oral mucosa moist,. Non icteric sclera. No pallor. Resp:  CTA bilaterally. No wheezing/rhonchi/rales. No accessory muscle use   Chest Wall: No deformity   CV:  Regular rhythm, normal rate, no murmurs, gallops, rubs    GI:  Abdomen soft, normoactive, nontender. Digital rectal exam with the rectum full of soft stool, with small blood at the end of the exam, patient had external hemorrhoids, sphincters are intact.     :  No CVA or suprapubic tenderness    Musculoskeletal:  No edema, warm, 2+ pulses throughout    Neurologic:  Mental status:AAOx3, Cranial nerves II-XII : WNL  Motor exam:Moves all extremities symmetrically              Data Review:    Review and/or order of clinical lab test  Review and/or order of tests in the radiology section of CPT  Review and/or order of tests in the medicine section of CPT      Labs:     Recent Labs     03/31/21  0311 03/29/21  1540   WBC 7.3 8.6   HGB 11.6 13.0   HCT 36.5 39.0   * 239     Recent Labs     03/31/21  0311 03/29/21  1540    142   K 4.5 3.4*   * 106   CO2 24 26   BUN 15 13   CREA 0.71 0.93   GLU 47* 107*   CA 8.0* 9.6     Recent Labs     03/29/21  1540   ALT 37   *   TBILI 0.6   TP 6.9   ALB 3.2*   GLOB 3.7     Recent Labs     03/29/21  1540   INR 1.1   PTP 11.5*   APTT 24.5      No results for input(s): FE, TIBC, PSAT, FERR in the last 72 hours. No results found for: FOL, RBCF   No results for input(s): PH, PCO2, PO2 in the last 72 hours. No results for input(s): CPK, CKNDX, TROIQ in the last 72 hours.     No lab exists for component: CPKMB  Lab Results   Component Value Date/Time    Cholesterol, total 134 09/30/2014 05:00 AM    HDL Cholesterol 74 09/30/2014 05:00 AM    LDL, calculated 54.2 09/30/2014 05:00 AM    Triglyceride 29 09/30/2014 05:00 AM    CHOL/HDL Ratio 1.8 09/30/2014 05:00 AM     Lab Results   Component Value Date/Time    Glucose (POC) 118 (H) 03/31/2021 11:37 AM    Glucose (POC) 60 (L) 03/31/2021 11:10 AM    Glucose (POC) 85 03/31/2021 07:27 AM    Glucose (POC) 172 (H) 03/31/2021 05:27 AM    Glucose (POC) 44 (LL) 03/31/2021 04:43 AM     Lab Results   Component Value Date/Time    Color YELLOW/STRAW 03/19/2021 02:12 AM    Appearance CLEAR 03/19/2021 02:12 AM    Specific gravity <1.005 03/19/2021 02:12 AM    Specific gravity 1.021 12/16/2020 01:48 PM    pH (UA) 6.0 03/19/2021 02:12 AM    Protein Negative 03/19/2021 02:12 AM    Glucose Negative 03/19/2021 02:12 AM    Ketone Negative 03/19/2021 02:12 AM    Bilirubin Negative 03/19/2021 02:12 AM    Urobilinogen 1.0 03/19/2021 02:12 AM    Nitrites Negative 03/19/2021 02:12 AM    Leukocyte Esterase SMALL (A) 03/19/2021 02:12 AM    Epithelial cells FEW 03/19/2021 02:12 AM    Bacteria 1+ (A) 03/19/2021 02:12 AM    WBC 20-50 03/19/2021 02:12 AM    RBC 10-20 03/19/2021 02:12 AM         Medications Reviewed:     Current Facility-Administered Medications   Medication Dose Route Frequency    glucose chewable tablet 16 g  4 Tab Oral PRN    dextrose (D50W) injection syrg 12.5-25 g  12.5-25 g IntraVENous PRN    glucagon (GLUCAGEN) injection 1 mg  1 mg IntraMUSCular PRN    dextrose 5% and 0.9% NaCl infusion  75 mL/hr IntraVENous CONTINUOUS    polyethylene glycol (MIRALAX) packet 17 g  17 g Oral BID    senna-docusate (PERICOLACE) 8.6-50 mg per tablet 2 Tab  2 Tab Oral DAILY    sodium chloride (NS) flush 5-40 mL  5-40 mL IntraVENous Q8H    sodium chloride (NS) flush 5-40 mL  5-40 mL IntraVENous PRN    acetaminophen (TYLENOL) tablet 650 mg  650 mg Oral Q6H PRN    Or    acetaminophen (TYLENOL) suppository 650 mg  650 mg Rectal Q6H PRN    promethazine (PHENERGAN) tablet 12.5 mg  12.5 mg Oral Q6H PRN    Or    ondansetron (ZOFRAN) injection 4 mg  4 mg IntraVENous Q6H PRN    pantoprazole (PROTONIX) 40 mg in 0.9% sodium chloride 10 mL injection  40 mg IntraVENous Q12H    bisacodyL (DULCOLAX) suppository 10 mg  10 mg Rectal DAILY PRN     ______________________________________________________________________  EXPECTED LENGTH OF STAY: 3d 4h  ACTUAL LENGTH OF STAY:          0                 Heather Gottlieb MD

## 2021-03-31 NOTE — PROGRESS NOTES
The daughter wishes to speak with the doctor. Please all Wil Hernandez at [phone]. She will be at an appointment between 11:30 and 12:30 today.

## 2021-03-31 NOTE — PROGRESS NOTES
TRANSFER - OUT REPORT:    Verbal report given to 5E on Leena Valdez for  routine progression of care       Report consisted of patients Situation, Background, Assessment and   Recommendations(SBAR). Information from the following report(s) SBAR, Intake/Output, MAR and Med Rec Status was reviewed with the receiving nurse. Lines:   Peripheral IV 03/29/21 Left Antecubital (Active)   Site Assessment Clean, dry, & intact 03/30/21 2040   Phlebitis Assessment 0 03/30/21 2040   Infiltration Assessment 0 03/30/21 2040   Dressing Status Clean, dry, & intact 03/30/21 2040   Dressing Type Transparent 03/30/21 2040   Hub Color/Line Status Infusing 03/30/21 2040   Action Taken Open ports on tubing capped 03/30/21 2040   Alcohol Cap Used Yes 03/30/21 2040        Opportunity for questions and clarification was provided.       Patient transported with:   Registered Nurse

## 2021-03-31 NOTE — PROGRESS NOTES
Problem: Mobility Impaired (Adult and Pediatric)  Goal: *Acute Goals and Plan of Care (Insert Text)  Description: FUNCTIONAL STATUS PRIOR TO ADMISSION: Patient was modified independent using a single point cane when out in the community or furniture surfer at home for functional mobility. HOME SUPPORT PRIOR TO ADMISSION: The patient lived with her daughter and son-in-law with no assistance needed for mobility. Physical Therapy Goals  Initiated 3/30/2021  1. Patient will move from supine to sit and sit to supine  in bed with independence within 7 day(s). 2.  Patient will transfer from bed to chair and chair to bed with modified independence using the least restrictive device within 7 day(s). 3.  Patient will perform sit to stand with modified independence within 7 day(s). 4.  Patient will ambulate with modified independence for 150 feet with the least restrictive device within 7 day(s). Outcome: Progressing Towards Goal   PHYSICAL THERAPY TREATMENT  Patient: Benita Chery (36 y.o. female)  Date: 3/31/2021  Diagnosis: Intractable vomiting [R11.10]  Fecal impaction (HCC) [K56.41] <principal problem not specified>       Precautions: Fall(very [place])  Chart, physical therapy assessment, plan of care and goals were reviewed. ASSESSMENT  Patient continues with skilled PT services and is progressing towards goals. Pt presents with decreased strength, balance, and impaired functional mobility below her baseline level. She is [place] and is wearing a hearing aide in her L ear. Pt transferred to sit EOB with supervision and stood with CGA. She ambulated with a single point cane however using her opposite hand to reach out for rail or counters for additional support. Pt returned to room and had incontinence of loose stool and immediately assisted to bedside commode. She was assisted with hygiene, changing gown, socks, and donned a brief after having a second episode of incontinence upon standing.   Pt remained up in chair at session end. Current Level of Function Impacting Discharge (mobility/balance): bed mobility supervision, transfers with CGA, ambulation with SPC x 150 feet with CGA, reaching out for additional support with opposite hand    Other factors to consider for discharge: fall risk, lives with family          PLAN :  Patient continues to benefit from skilled intervention to address the above impairments. Continue treatment per established plan of care. to address goals. Recommendation for discharge: (in order for the patient to meet his/her long term goals)  Physical therapy at least 2 days/week in the home     This discharge recommendation:  Has not yet been discussed the attending provider and/or case management    IF patient discharges home will need the following DME: rolling walker possibly       SUBJECTIVE:   Patient stated It is a good thing I did not do that when I was in the deleon.     OBJECTIVE DATA SUMMARY:   Critical Behavior:  Neurologic State: Alert, [place]  Orientation Level: Oriented X4  Cognition: Appropriate decision making  Safety/Judgement: Awareness of environment, Fall prevention, Insight into deficits  Functional Mobility Training:  Bed Mobility:     Supine to Sit: Supervision     Scooting: Supervision        Transfers:  Sit to Stand: Contact guard assistance  Stand to Sit: Contact guard assistance                             Balance:  Sitting: Intact  Standing: Impaired; With support  Standing - Static: Good  Standing - Dynamic : Fair  Ambulation/Gait Training:  Distance (ft): (150)  Assistive Device: Cane, straight;Gait belt  Ambulation - Level of Assistance: Contact guard assistance;Assist x1        Gait Abnormalities: Decreased step clearance              Speed/Yadira: Slow                Pain Rating:  No complaints     Activity Tolerance:   Good    After treatment patient left in no apparent distress:   Sitting in chair and Call bell within reach    COMMUNICATION/COLLABORATION:   The patients plan of care was discussed with: Registered nurse.      Erica Call   Time Calculation: 29 mins

## 2021-04-01 ENCOUNTER — APPOINTMENT (OUTPATIENT)
Dept: GENERAL RADIOLOGY | Age: 76
DRG: 389 | End: 2021-04-01
Attending: PHYSICIAN ASSISTANT
Payer: MEDICARE

## 2021-04-01 LAB
GLUCOSE BLD STRIP.AUTO-MCNC: 100 MG/DL (ref 65–100)
SERVICE CMNT-IMP: NORMAL

## 2021-04-01 PROCEDURE — 65270000032 HC RM SEMIPRIVATE

## 2021-04-01 PROCEDURE — C9113 INJ PANTOPRAZOLE SODIUM, VIA: HCPCS | Performed by: FAMILY MEDICINE

## 2021-04-01 PROCEDURE — 74011000250 HC RX REV CODE- 250: Performed by: FAMILY MEDICINE

## 2021-04-01 PROCEDURE — 74011250636 HC RX REV CODE- 250/636: Performed by: FAMILY MEDICINE

## 2021-04-01 PROCEDURE — 82962 GLUCOSE BLOOD TEST: CPT

## 2021-04-01 PROCEDURE — 74011250637 HC RX REV CODE- 250/637: Performed by: PHYSICIAN ASSISTANT

## 2021-04-01 PROCEDURE — 74018 RADEX ABDOMEN 1 VIEW: CPT

## 2021-04-01 PROCEDURE — 74011250637 HC RX REV CODE- 250/637: Performed by: HOSPITALIST

## 2021-04-01 PROCEDURE — 97535 SELF CARE MNGMENT TRAINING: CPT

## 2021-04-01 RX ADMIN — POLYETHYLENE GLYCOL 3350 17 G: 17 POWDER, FOR SOLUTION ORAL at 18:16

## 2021-04-01 RX ADMIN — POLYETHYLENE GLYCOL 3350 17 G: 17 POWDER, FOR SOLUTION ORAL at 11:24

## 2021-04-01 RX ADMIN — Medication 10 ML: at 14:11

## 2021-04-01 RX ADMIN — SODIUM CHLORIDE 40 MG: 9 INJECTION INTRAMUSCULAR; INTRAVENOUS; SUBCUTANEOUS at 11:23

## 2021-04-01 RX ADMIN — SODIUM CHLORIDE 40 MG: 9 INJECTION INTRAMUSCULAR; INTRAVENOUS; SUBCUTANEOUS at 21:58

## 2021-04-01 RX ADMIN — Medication 10 ML: at 21:59

## 2021-04-01 RX ADMIN — DOCUSATE SODIUM 50 MG AND SENNOSIDES 8.6 MG 2 TABLET: 8.6; 5 TABLET, FILM COATED ORAL at 11:23

## 2021-04-01 NOTE — PROGRESS NOTES
Bedside and Verbal shift change report given to Monica Gifford (oncoming nurse) by Richi Medrano (offgoing nurse). Report included the following information SBAR, Kardex, MAR and Recent Results.

## 2021-04-01 NOTE — PROGRESS NOTES
Bedside shift change report given to [address] [place] [address] (oncoming nurse) by Benja Blanca RN (offgoing nurse). Report included the following information SBAR, Procedure Summary, Intake/Output and Recent Results.

## 2021-04-01 NOTE — PROGRESS NOTES
Problem: Self Care Deficits Care Plan (Adult)  Goal: *Acute Goals and Plan of Care (Insert Text)  Description:   FUNCTIONAL STATUS PRIOR TO ADMISSION: Patient was modified independent using a single point cane for functional mobility, or furniture walking in the home. Patient was modified independent for basic and instrumental ADLs. HOME SUPPORT: The patient lived with dtr and JALEESA but did not require assist.    Occupational Therapy Goals  Initiated 3/30/2021  1. Patient will perform grooming, standing at sink, with modified independence within 7 day(s). 2.  Patient will perform lower body dressing with modified independence within 7 day(s). 3.  Patient will perform simple home management task with supervision/set-up within 7 day(s). 4.  Patient will perform toilet transfers and all aspects of toileting with modified independence within 7 day(s). 5.  Patient will participate in upper extremity therapeutic exercise/activities with modified independence for 5 minutes within 7 day(s). 6.  Patient will utilize energy conservation techniques during functional activities with verbal cues within 7 day(s). Outcome: Progressing Towards Goal    OCCUPATIONAL THERAPY TREATMENT  Patient: Pantera Krishnamurthy (74 y.o. female)  Date: 4/1/2021  Diagnosis: Intractable vomiting [R11.10]  [facility] [K56.41] <principal problem not specified>       Precautions: Fall(very [place])  Chart, occupational therapy assessment, plan of care, and goals were reviewed. ASSESSMENT  Patient continues with skilled OT services and is progressing towards goals. AAOx4, overall set/up assistance to supervision for standing grooming, bathroom mobility, and toileting with exception of second BM when returning to bed requiring mod A to help manage clothing and hygiene for speed. Pt had decent standing tolerance and no LOB, however needed cues to assist with not reaching outside of LAY.  She did state that she furniture walks a bit at home. Overall the patient is making progress and do not anticipate needs at DC depending on stability and length of hospital stay. She may benefit from [facility] if she returns home below her baseline and/or with a RW which she is unfamiliar with for ADLs and IADLs. Acute care OT will continue to see while in hospital.      Current Level of Function Impacting Discharge (ADLs): min A to supervision     Other factors to consider for discharge: level of assistance needed, new AD? PLAN :  Patient continues to benefit from skilled intervention to address the above impairments. Continue treatment per established plan of care to address goals. Recommend with staff: OOB for meals, encourage independence with self-care as appropriate     Recommend next OT session: HEP    Recommendation for discharge: (in order for the patient to meet his/her long term goals)  Occupational therapy at least 2 days/week in the home versus no needs    This discharge recommendation:  Has not yet been discussed the attending provider and/or case management    IF patient discharges home will need the following DME: none       SUBJECTIVE:   Patient stated do you think it is unsafe when I reach for things when I walk? Genevive Begun    OBJECTIVE DATA SUMMARY:   Cognitive/Behavioral Status:  Neurologic State: Alert  Orientation Level: Oriented X4                Functional Mobility and Transfers for ADLs:  Bed Mobility:  Supine to Sit: Supervision    Transfers:  Sit to Stand: Supervision  Functional Transfers  Bathroom Mobility: Supervision/set up  Toilet Transfer : Supervision       Balance:  Sitting: Intact  Standing: Intact; With support  Standing - Static: Good  Standing - Dynamic : Good    ADL Intervention:     Progressed to the bathroom for standing grooming, toileting (urgency with BM with ANY movement) and progressed back to bed where second toileting occured    Grooming  Grooming Assistance: Supervision  Position Performed: Standing  Brushing Teeth: Set-up      Toileting  Toileting Assistance: Minimum assistance  Bladder Hygiene: Minimum assistance  Bowel Hygiene: Minimum assistance  Clothing Management: Minimum assistance  Adaptive Equipment: Grab bars           Pain:  None rated    Activity Tolerance:   Good    After treatment patient left in no apparent distress:   Supine in bed and x-ray in room    COMMUNICATION/COLLABORATION:   The patients plan of care was discussed with: Physical therapist and Registered nurse.      Judith Dahl  Time Calculation: 29 mins

## 2021-04-01 NOTE — PROGRESS NOTES
[facility].  [address] [place] [address] E Kristie Bauer, [address]  [phone]                     GI PROGRESS NOTE    Patient Name: Nelson Waterman      : 1945      MRN: [id number]  Admit Date: 3/29/2021  Today's Date: 2021    Subjective:     She reports having had a large BM this morning and denies abdominal pain. Her nurse reports two large BMs overnight/morning and patient has finished her full liquid diet trays without c/o nausea or abdominal pain. Objective:     Blood pressure 126/74, pulse 71, temperature 97.8 °F (36.6 °C), resp. rate 18, height 5' 4\" (1.626 m), weight 59 kg (130 lb), SpO2 99 %. Physical Exam:  General appearance: cooperative, no distress  HEENT: Sclerae anicteric. Extra-occular muscles are intact. GI: Abdomen nondistended, soft, non-tender  Neurological:  alert and oriented. Psychiatric: No anxiety or agitation. Data Review:    Recent Results (from the past 24 hour(s))   GLUCOSE, POC    Collection Time: 21 11:37 AM   Result Value Ref Range    Glucose (POC) 118 (H) 65 - 100 mg/dL    Performed by [address] [place] [address], POC    Collection Time: 21  3:28 PM   Result Value Ref Range    Glucose (POC) 125 (H) 65 - 100 mg/dL    Performed by Sherice Valiente    GLUCOSE, POC    Collection Time: 21 11:15 PM   Result Value Ref Range    Glucose (POC) 75 65 - 100 mg/dL    Performed by Radha Michele, POC    Collection Time: 21  5:37 AM   Result Value Ref Range    Glucose (POC) 100 65 - 100 mg/dL    Performed by Celina Dense / Plan :     Fecal impaction:   - CT A/P with contrast 3/29: fecal impaction persistent from 3/19. 9.5 x 9.5 x 14cm stool ball in rectum; diffuse constipation; associated stercoral procitis and small volume ascites.  New finding of right hydronephrosis related to fecal impaction.   - Feeling better post Q4 enema + disimpaction  - KUB 3/31 - Decreased volume of stool within the rectosigmoid with persistent large  volume of stool within the right and transverse colon  - Has been having productive BMs and has no complaints. - Advance to GI lite diet  - Will recheck KUB today  - Continue BID Miralax and once daily Pericolace -- recommend this regimen continue after discharge   - GI signing off, please call if needed again prior to discharge.      N/V prior to admission:   - suspect related to significant constipation  - H/o LA grade C esophagitis on EGD 12/2020 - on BID PPI. Today Hgb 13.0 (3/22 10.5). - H/o gastroparesis on GES 02/15/21 but less likely cause of sx;   - Continue ondansetron prn     H/o esophageal stenosis:   - s/p dilation 12/2020. Currently denying dysphagia  - Can consider SLP evaluation as inpatient if necessary once taking po     Patient Active Hospital Problem List:   Active Problems:    Intractable vomiting (3/29/2021)      Fecal impaction ([address] (3/29/2021)    I have personally reviewed the history and independently examined the patient. I have reviewed the chart and agree with the documentation recorded by the Mid Level Provider, including the assessment, treatment plan, and disposition.     Alvina Moyer MD

## 2021-04-01 NOTE — PROGRESS NOTES
[facility] [facility] Adult  Hospitalist Group                                                                                          Hospitalist Progress Note  Sussy Feldman MD  Answering service: [phone] from in house phone        Date of Service:  2021  NAME:  Christina Patiño  :  1945  MRN:  [id number]    This documentation was facilitated by a Voice Recognition software and may contain inadvertent typographical errors. Admission Summary:   Patient was brought to the ED with nausea, vomiting and lower abdominal pain. She was recently hospitalized here from 3/193/22 when she was treated for sepsis secondary to pneumonia and CHATO. Interval history / Subjective:        Patient was  on the toilet this morning and is having good BMs      Assessment & Plan:   Severe constipation. · CAT scan of the abdomen pelvis showed rectal fecal impaction, diffuse constipation with associated stercoral proctitis and a small volume of ascites, mild right hydronephrosis secondary to rectal fecal impaction.         : MiraLAX, Colace, bisacodyl suppository, citrate of magnesia  · Enema. · She is now moving her bowels. KUB showed decreased volume of stool within the rectal sigmoid with persistent large volume of stool within the right and transverse colon  · Advance to GI light diet . Mild obstructive right hydronephrosis due to constipation. · We will reassess with renal ultrasound once constipation resolves. Outpatient    ? Coffee-ground emesis  : Monitor hemoglobin daily, IV PPI. H/o LA grade C esophagitis on EGD 2020 - on BID PPI.       Hypoglycemia earlier as she was n.p.o. Added diet    Mild hypokalemia:   Continue to replace and monitor      History of dysphagia, esophageal stenosis status post dilatation in 2020, per daughter, she has only been eating liquid/soft foods at home. Gastroenterology on board  plan for outpatient MBS      Recent sepsis secondary to pneumonia. Chest x-ray on admission showed improvement of the airspace disease      Code status: Full code  DVT prophylaxis: SCD  Care Plan discussed with: Patient/Family and Nurse   Care plan discussed with daughter on the phone  Anticipated Disposition: Home w/Family  Anticipated Discharge: Greater than 48 hours  Hospital Problems  Date Reviewed: 3/19/2021          Codes Class Noted POA    Intractable vomiting ICD-10-CM: R11.10  ICD-9-CM: 536.2  3/29/2021 Unknown        Fecal impaction ([address] ICD-10-CM: K56.41  ICD-9-CM: 560.32  3/29/2021 Unknown                Review of Systems:   A comprehensive review of systems was negative except for that written in the HPI. Vital Signs:    Last 24hrs VS reviewed since prior progress note. Most recent are:  Visit Vitals  /74   Pulse 71   Temp 97.8 °F (36.6 °C)   Resp 18   Ht 5' 4\" (1.626 m)   Wt 59 kg (130 lb)   SpO2 99%   BMI 22.31 kg/m²       No intake or output data in the 24 hours ending 04/01/21 1346     Physical Examination:     I had a face to face encounter with this patient and independently examined them on4/1/2021 as outlined below:        Constitutional:  Appears uncomfortable. Not in distress   HEENT:  Atraumatic. Oral mucosa moist,. Non icteric sclera. No pallor. Resp:  CTA bilaterally. No wheezing/rhonchi/rales. No accessory muscle use   Chest Wall: No deformity   CV:  Regular rhythm, normal rate, no murmurs, gallops, rubs    GI:  Abdomen soft, normoactive, nontender. Digital rectal exam with the rectum full of soft stool, with small blood at the end of the exam, patient had external hemorrhoids, sphincters are intact.     :  No CVA or suprapubic tenderness    Musculoskeletal:  No edema, warm, 2+ pulses throughout    Neurologic:  Mental status:AAOx3,   Cranial nerves II-XII : WNL  Motor exam:Moves all extremities symmetrically              Data Review:    Review and/or order of clinical lab test  Review and/or order of tests in the radiology section of CPT  Review and/or order of tests in the medicine section of CPT      Labs:     Recent Labs     03/31/21  0311 03/29/21  1540   WBC 7.3 8.6   HGB 11.6 13.0   HCT 36.5 39.0   * 239     Recent Labs     03/31/21  0311 03/29/21  1540    142   K 4.5 3.4*   * 106   CO2 24 26   BUN 15 13   CREA 0.71 0.93   GLU 47* 107*   CA 8.0* 9.6     Recent Labs     03/29/21  1540   ALT 37   *   TBILI 0.6   TP 6.9   ALB 3.2*   GLOB 3.7     Recent Labs     03/29/21  1540   INR 1.1   PTP 11.5*   APTT 24.5      No results for input(s): FE, TIBC, PSAT, FERR in the last 72 hours. No results found for: FOL, RBCF   No results for input(s): PH, PCO2, PO2 in the last 72 hours. No results for input(s): CPK, CKNDX, TROIQ in the last 72 hours.     No lab exists for component: CPKMB  Lab Results   Component Value Date/Time    Cholesterol, total 134 09/30/2014 05:00 AM    HDL Cholesterol 74 09/30/2014 05:00 AM    LDL, calculated 54.2 09/30/2014 05:00 AM    Triglyceride 29 09/30/2014 05:00 AM    CHOL/HDL Ratio 1.8 09/30/2014 05:00 AM     Lab Results   Component Value Date/Time    Glucose (POC) 100 04/01/2021 05:37 AM    Glucose (POC) 75 03/31/2021 11:15 PM    Glucose (POC) 125 (H) 03/31/2021 03:28 PM    Glucose (POC) 118 (H) 03/31/2021 11:37 AM    Glucose (POC) 60 (L) 03/31/2021 11:10 AM     Lab Results   Component Value Date/Time    Color YELLOW/STRAW 03/19/2021 02:12 AM    Appearance CLEAR 03/19/2021 02:12 AM    Specific gravity <1.005 03/19/2021 02:12 AM    Specific gravity 1.021 12/16/2020 01:48 PM    pH (UA) 6.0 03/19/2021 02:12 AM    Protein Negative 03/19/2021 02:12 AM    Glucose Negative 03/19/2021 02:12 AM    Ketone Negative 03/19/2021 02:12 AM    Bilirubin Negative 03/19/2021 02:12 AM    Urobilinogen 1.0 03/19/2021 02:12 AM    Nitrites Negative 03/19/2021 02:12 AM    Leukocyte Esterase SMALL (A) 03/19/2021 02:12 AM    Epithelial cells FEW 03/19/2021 02:12 AM    Bacteria 1+ (A) 03/19/2021 02:12 AM    WBC 20-50 03/19/2021 02:12 AM    RBC 10-20 03/19/2021 02:12 AM         Medications Reviewed:     Current Facility-Administered Medications   Medication Dose Route Frequency    glucose chewable tablet 16 g  4 Tab Oral PRN    dextrose (D50W) injection syrg 12.5-25 g  12.5-25 g IntraVENous PRN    glucagon (GLUCAGEN) injection 1 mg  1 mg IntraMUSCular PRN    polyethylene glycol (MIRALAX) packet 17 g  17 g Oral BID    senna-docusate (PERICOLACE) 8.6-50 mg per tablet 2 Tab  2 Tab Oral DAILY    sodium chloride (NS) flush 5-40 mL  5-40 mL IntraVENous Q8H    sodium chloride (NS) flush 5-40 mL  5-40 mL IntraVENous PRN    acetaminophen (TYLENOL) tablet 650 mg  650 mg Oral Q6H PRN    Or    acetaminophen (TYLENOL) suppository 650 mg  650 mg Rectal Q6H PRN    promethazine (PHENERGAN) tablet 12.5 mg  12.5 mg Oral Q6H PRN    Or    ondansetron (ZOFRAN) injection 4 mg  4 mg IntraVENous Q6H PRN    pantoprazole (PROTONIX) 40 mg in 0.9% sodium chloride 10 mL injection  40 mg IntraVENous Q12H    bisacodyL (DULCOLAX) suppository 10 mg  10 mg Rectal DAILY PRN     ______________________________________________________________________  EXPECTED LENGTH OF STAY: 3d 4h  ACTUAL LENGTH OF STAY:          1                 Tammi Ness MD

## 2021-04-02 LAB
ANION GAP SERPL CALC-SCNC: 5 MMOL/L (ref 5–15)
BUN SERPL-MCNC: 6 MG/DL (ref 6–20)
BUN/CREAT SERPL: 8 (ref 12–20)
CALCIUM SERPL-MCNC: 7.3 MG/DL (ref 8.5–10.1)
CHLORIDE SERPL-SCNC: 115 MMOL/L (ref 97–108)
CO2 SERPL-SCNC: 23 MMOL/L (ref 21–32)
CREAT SERPL-MCNC: 0.72 MG/DL (ref 0.55–1.02)
GLUCOSE BLD STRIP.AUTO-MCNC: 111 MG/DL (ref 65–100)
GLUCOSE BLD STRIP.AUTO-MCNC: 118 MG/DL (ref 65–100)
GLUCOSE BLD STRIP.AUTO-MCNC: 143 MG/DL (ref 65–100)
GLUCOSE BLD STRIP.AUTO-MCNC: 95 MG/DL (ref 65–100)
GLUCOSE SERPL-MCNC: 111 MG/DL (ref 65–100)
POTASSIUM SERPL-SCNC: 3.6 MMOL/L (ref 3.5–5.1)
SERVICE CMNT-IMP: ABNORMAL
SERVICE CMNT-IMP: NORMAL
SODIUM SERPL-SCNC: 143 MMOL/L (ref 136–145)

## 2021-04-02 PROCEDURE — 65270000032 HC RM SEMIPRIVATE

## 2021-04-02 PROCEDURE — 97116 GAIT TRAINING THERAPY: CPT | Performed by: PHYSICAL THERAPIST

## 2021-04-02 PROCEDURE — 36415 COLL VENOUS BLD VENIPUNCTURE: CPT

## 2021-04-02 PROCEDURE — 74011250636 HC RX REV CODE- 250/636: Performed by: FAMILY MEDICINE

## 2021-04-02 PROCEDURE — C9113 INJ PANTOPRAZOLE SODIUM, VIA: HCPCS | Performed by: FAMILY MEDICINE

## 2021-04-02 PROCEDURE — 74011250637 HC RX REV CODE- 250/637: Performed by: PHYSICIAN ASSISTANT

## 2021-04-02 PROCEDURE — 82962 GLUCOSE BLOOD TEST: CPT

## 2021-04-02 PROCEDURE — 74011250637 HC RX REV CODE- 250/637: Performed by: HOSPITALIST

## 2021-04-02 PROCEDURE — 80048 BASIC METABOLIC PNL TOTAL CA: CPT

## 2021-04-02 PROCEDURE — 74011000250 HC RX REV CODE- 250: Performed by: FAMILY MEDICINE

## 2021-04-02 RX ADMIN — Medication 10 ML: at 14:16

## 2021-04-02 RX ADMIN — SODIUM CHLORIDE 40 MG: 9 INJECTION INTRAMUSCULAR; INTRAVENOUS; SUBCUTANEOUS at 20:30

## 2021-04-02 RX ADMIN — SODIUM CHLORIDE 40 MG: 9 INJECTION INTRAMUSCULAR; INTRAVENOUS; SUBCUTANEOUS at 10:00

## 2021-04-02 RX ADMIN — POLYETHYLENE GLYCOL 3350 17 G: 17 POWDER, FOR SOLUTION ORAL at 18:35

## 2021-04-02 RX ADMIN — DOCUSATE SODIUM 50 MG AND SENNOSIDES 8.6 MG 2 TABLET: 8.6; 5 TABLET, FILM COATED ORAL at 09:59

## 2021-04-02 RX ADMIN — POLYETHYLENE GLYCOL 3350 17 G: 17 POWDER, FOR SOLUTION ORAL at 09:59

## 2021-04-02 NOTE — PROGRESS NOTES
SLP Contact Note    Consult received and appreciated for MBS. Discussed with Dr. Carin Mukherjee. Radiology unable to add on MBS vs esophagram today. Dr. Carin Mukherjee states it can be done on Monday. However, given pt history of GI deficits including esophageal stenosis, would consider an esophagram/barium swallow/upper GI over a modified barium swallow study. Dr. Carin Mukherjee states that she will ask GI and get back with SLP. If MBS wanted, will plan to complete Monday.       Thank you,  BRANDIE Go.Ed, [address] [facility]  Speech-Language Pathologist

## 2021-04-02 NOTE — PROGRESS NOTES
Problem: Mobility Impaired (Adult and Pediatric)  Goal: *Acute Goals and Plan of Care (Insert Text)  Description: FUNCTIONAL STATUS PRIOR TO ADMISSION: Patient was modified independent using a single point cane when out in the community or furniture surfer at home for functional mobility. HOME SUPPORT PRIOR TO ADMISSION: The patient lived with her daughter and son-in-law with no assistance needed for mobility. Physical Therapy Goals  Initiated 3/30/2021  1. Patient will move from supine to sit and sit to supine  in bed with independence within 7 day(s). 2.  Patient will transfer from bed to chair and chair to bed with modified independence using the least restrictive device within 7 day(s). 3.  Patient will perform sit to stand with modified independence within 7 day(s). 4.  Patient will ambulate with modified independence for 150 feet with the least restrictive device within 7 day(s). Outcome: Progressing Towards Goal   PHYSICAL THERAPY TREATMENT  Patient: Bobbi Smith (16 y.o. female)  Date: 4/2/2021  Diagnosis: Intractable vomiting [R11.10]  Fecal impaction (HCC) [K56.41] <principal problem not specified>       Precautions: Fall(very [place])  Chart, physical therapy assessment, plan of care and goals were reviewed. ASSESSMENT  Patient continues with skilled PT services and is progressing towards goals. Overall limited by impaired balance, gait instability and decreased activity tolerance. Patient supervision for bed mobility and CGA for transfers. Able to amb approx 110 feet with gait belt and HHA with no overt LOB but is generally unstable. Recommend  PT follow up at AL to progress mobility. Other factors to consider for discharge: at risk for falls, below baseline         PLAN :  Patient continues to benefit from skilled intervention to address the above impairments. Continue treatment per established plan of care. to address goals.     Recommendation for discharge: (in order for the patient to meet his/her long term goals)  Physical therapy at least 2 days/week in the home       IF patient discharges home will need the following DME: patient owns DME required for discharge       SUBJECTIVE:   Patient stated I like a cane better than  a walker.     OBJECTIVE DATA SUMMARY:   Critical Behavior:  Neurologic State: Alert, Appropriate for age  Orientation Level: Oriented X4  Cognition: Appropriate decision making  Safety/Judgement: Awareness of environment, Fall prevention, Insight into deficits  Functional Mobility Training:  Bed Mobility:     Supine to Sit: Supervision              Transfers:  Sit to Stand: Contact guard assistance  Stand to Sit: Contact guard assistance                             Balance:  Sitting: Intact  Standing: Intact; With support  Standing - Static: Good  Standing - Dynamic : Good  Ambulation/Gait Training:  Distance (ft): 110 Feet (ft)  Assistive Device: Gait belt(HHA on the L)  Ambulation - Level of Assistance: Minimal assistance;Assist x1        Gait Abnormalities: Decreased step clearance              Speed/Yadira: Pace decreased (<100 feet/min); Slow       Pain Rating:  No c/o pain    Activity Tolerance:   Fair and requires rest breaks    After treatment patient left in no apparent distress:   Supine in bed and Call bell within reach    COMMUNICATION/COLLABORATION:   The patients plan of care was discussed with: Physical therapist, Occupational therapist, and Registered nurse.      Tova Sun PT, DPT   Time Calculation: 10 mins

## 2021-04-02 NOTE — PROGRESS NOTES
[facility] [facility] Adult  Hospitalist Group                                                                                          Hospitalist Progress Note  Barrett Lee MD  Answering service: [phone] from in house phone        Date of Service:  2021  NAME:  Pantera Krishnamurthy  :  1945  MRN:  [id number]    This documentation was facilitated by a Voice Recognition software and may contain inadvertent typographical errors. Admission Summary:   Patient was brought to the ED with nausea, vomiting and lower abdominal pain. She was recently hospitalized here from 3/193/22 when she was treated for sepsis secondary to pneumonia and CHATO. Interval history / Subjective:        Patient  Was seen and examined and she is not able to tell me anything about how she is doing       Assessment & Plan:   Severe constipation. · CAT scan of the abdomen pelvis showed rectal fecal impaction, diffuse constipation with associated stercoral proctitis and a small volume of ascites, mild right hydronephrosis secondary to rectal fecal impaction.         : MiraLAX, Colace, bisacodyl suppository, citrate of magnesia  · Enema. · She is now moving her bowels. KUB showed decreased volume of stool within the rectal sigmoid with persistent large volume of stool within the right and transverse colon  · Advance to GI light diet . Mild obstructive right hydronephrosis due to constipation. · We will reassess with renal ultrasound once constipation resolves. Outpatient    ? Coffee-ground emesis  : Monitor hemoglobin daily, IV PPI. H/o LA grade C esophagitis on EGD 2020 - on BID PPI.       Hypoglycemia earlier as she was n.p.o. Added diet    Mild hypokalemia:   Continue to replace and monitor      History of dysphagia, esophageal stenosis status post dilatation in 2020, per daughter, she has only been eating liquid/soft foods at home.   Gastroenterology on board  plan for outpatient MBS       Recent sepsis secondary to pneumonia. Chest x-ray on admission showed improvement of the airspace disease        Talked in details with daughter Leah Isaac , she wants her to be totally cleaned before discharged       Code status: Full code  DVT prophylaxis: SCD  Care Plan discussed with: Patient/Family and Nurse   Care plan discussed with daughter on the phone  Anticipated Disposition: Home w/Family  Anticipated Discharge: Greater than 48 hours  Hospital Problems  Date Reviewed: 3/19/2021          Codes Class Noted POA    Intractable vomiting ICD-10-CM: R11.10  ICD-9-CM: 536.2  3/29/2021 Unknown        Fecal impaction ([facility] [address] ICD-10-CM: K56.41  ICD-9-CM: 560.32  3/29/2021 Unknown                Review of Systems:   A comprehensive review of systems was negative except for that written in the HPI. Vital Signs:    Last 24hrs VS reviewed since prior progress note. Most recent are:  Visit Vitals  BP (!) 147/70   Pulse 80   Temp 98.2 °F (36.8 °C)   Resp 22   Ht 5' 4\" (1.626 m)   Wt 59 kg (130 lb)   SpO2 97%   BMI 22.31 kg/m²       No intake or output data in the 24 hours ending 04/02/21 1429     Physical Examination:     I had a face to face encounter with this patient and independently examined them on4/2/2021 as outlined below:        Constitutional:  Appears uncomfortable. Not in distress   HEENT:  Atraumatic. Oral mucosa moist,. Non icteric sclera. No pallor. Resp:  CTA bilaterally. No wheezing/rhonchi/rales. No accessory muscle use   Chest Wall: No deformity   CV:  Regular rhythm, normal rate, no murmurs, gallops, rubs    GI:  Abdomen soft, normoactive, nontender. Digital rectal exam with the rectum full of soft stool, with small blood at the end of the exam, patient had external hemorrhoids, sphincters are intact.     :  No CVA or suprapubic tenderness    Musculoskeletal:  No edema, warm, 2+ pulses throughout    Neurologic:  Mental status:AAOx3,   Cranial nerves II-XII : WNL  Motor exam:Moves all extremities symmetrically              Data Review:    Review and/or order of clinical lab test  Review and/or order of tests in the radiology section of CPT  Review and/or order of tests in the medicine section of CPT      Labs:     Recent Labs     03/31/21 0311   WBC 7.3   HGB 11.6   HCT 36.5   *     Recent Labs     04/02/21  0459 03/31/21 0311    143   K 3.6 4.5   * 111*   CO2 23 24   BUN 6 15   CREA 0.72 0.71   * 47*   CA 7.3* 8.0*     No results for input(s): ALT, AP, TBIL, TBILI, TP, ALB, GLOB, GGT, AML, LPSE in the last 72 hours. No lab exists for component: SGOT, GPT, AMYP, HLPSE  No results for input(s): INR, PTP, APTT, INREXT, INREXT in the last 72 hours. No results for input(s): FE, TIBC, PSAT, FERR in the last 72 hours. No results found for: FOL, RBCF   No results for input(s): PH, PCO2, PO2 in the last 72 hours. No results for input(s): CPK, CKNDX, TROIQ in the last 72 hours.     No lab exists for component: CPKMB  Lab Results   Component Value Date/Time    Cholesterol, total 134 09/30/2014 05:00 AM    HDL Cholesterol 74 09/30/2014 05:00 AM    LDL, calculated 54.2 09/30/2014 05:00 AM    Triglyceride 29 09/30/2014 05:00 AM    CHOL/HDL Ratio 1.8 09/30/2014 05:00 AM     Lab Results   Component Value Date/Time    Glucose (POC) 111 (H) 04/02/2021 05:15 AM    Glucose (POC) 143 (H) 04/02/2021 12:44 AM    Glucose (POC) 100 04/01/2021 05:37 AM    Glucose (POC) 75 03/31/2021 11:15 PM    Glucose (POC) 125 (H) 03/31/2021 03:28 PM     Lab Results   Component Value Date/Time    Color YELLOW/STRAW 03/19/2021 02:12 AM    Appearance CLEAR 03/19/2021 02:12 AM    Specific gravity <1.005 03/19/2021 02:12 AM    Specific gravity 1.021 12/16/2020 01:48 PM    pH (UA) 6.0 03/19/2021 02:12 AM    Protein Negative 03/19/2021 02:12 AM    Glucose Negative 03/19/2021 02:12 AM    Ketone Negative 03/19/2021 02:12 AM    Bilirubin Negative 03/19/2021 02:12 AM    Urobilinogen 1.0 03/19/2021 02:12 AM    Nitrites Negative 03/19/2021 02:12 AM    Leukocyte Esterase SMALL (A) 03/19/2021 02:12 AM    Epithelial cells FEW 03/19/2021 02:12 AM    Bacteria 1+ (A) 03/19/2021 02:12 AM    WBC 20-50 03/19/2021 02:12 AM    RBC 10-20 03/19/2021 02:12 AM         Medications Reviewed:     Current Facility-Administered Medications   Medication Dose Route Frequency    glucose chewable tablet 16 g  4 Tab Oral PRN    dextrose (D50W) injection syrg 12.5-25 g  12.5-25 g IntraVENous PRN    glucagon (GLUCAGEN) injection 1 mg  1 mg IntraMUSCular PRN    polyethylene glycol (MIRALAX) packet 17 g  17 g Oral BID    senna-docusate (PERICOLACE) 8.6-50 mg per tablet 2 Tab  2 Tab Oral DAILY    sodium chloride (NS) flush 5-40 mL  5-40 mL IntraVENous Q8H    sodium chloride (NS) flush 5-40 mL  5-40 mL IntraVENous PRN    acetaminophen (TYLENOL) tablet 650 mg  650 mg Oral Q6H PRN    Or    acetaminophen (TYLENOL) suppository 650 mg  650 mg Rectal Q6H PRN    promethazine (PHENERGAN) tablet 12.5 mg  12.5 mg Oral Q6H PRN    Or    ondansetron (ZOFRAN) injection 4 mg  4 mg IntraVENous Q6H PRN    pantoprazole (PROTONIX) 40 mg in 0.9% sodium chloride 10 mL injection  40 mg IntraVENous Q12H    bisacodyL (DULCOLAX) suppository 10 mg  10 mg Rectal DAILY PRN     ______________________________________________________________________  EXPECTED LENGTH OF STAY: 3d 4h  ACTUAL LENGTH OF STAY:          2                 Coretta Maya MD

## 2021-04-02 NOTE — PROGRESS NOTES
Pt had several loose bowel movements today. Bedside shift change report given to Rubi Beckett (oncoming nurse) by Quentin Osuna RN (offgoing nurse). Report included the following information SBAR, Kardex, Intake/Output, MAR and Recent Results.

## 2021-04-02 NOTE — PROGRESS NOTES
Bedside shift change report given to Leopoldo Righter, RN (oncoming nurse) by Karan James RN (offgoing nurse). Report included the following information SBAR, Kardex, Intake/Output, MAR and Recent Results.

## 2021-04-03 LAB
GLUCOSE BLD STRIP.AUTO-MCNC: 113 MG/DL (ref 65–100)
GLUCOSE BLD STRIP.AUTO-MCNC: 81 MG/DL (ref 65–100)
GLUCOSE BLD STRIP.AUTO-MCNC: 95 MG/DL (ref 65–100)
SERVICE CMNT-IMP: ABNORMAL
SERVICE CMNT-IMP: NORMAL
SERVICE CMNT-IMP: NORMAL

## 2021-04-03 PROCEDURE — C9113 INJ PANTOPRAZOLE SODIUM, VIA: HCPCS | Performed by: FAMILY MEDICINE

## 2021-04-03 PROCEDURE — 74011250637 HC RX REV CODE- 250/637: Performed by: HOSPITALIST

## 2021-04-03 PROCEDURE — 74011000250 HC RX REV CODE- 250: Performed by: FAMILY MEDICINE

## 2021-04-03 PROCEDURE — 65270000032 HC RM SEMIPRIVATE

## 2021-04-03 PROCEDURE — 74011250637 HC RX REV CODE- 250/637: Performed by: PHYSICIAN ASSISTANT

## 2021-04-03 PROCEDURE — 74011250636 HC RX REV CODE- 250/636: Performed by: FAMILY MEDICINE

## 2021-04-03 PROCEDURE — 82962 GLUCOSE BLOOD TEST: CPT

## 2021-04-03 RX ORDER — PANTOPRAZOLE SODIUM 40 MG/1
40 TABLET, DELAYED RELEASE ORAL 2 TIMES DAILY
Status: DISCONTINUED | OUTPATIENT
Start: 2021-04-03 | End: 2021-04-07 | Stop reason: HOSPADM

## 2021-04-03 RX ADMIN — POLYETHYLENE GLYCOL 3350 17 G: 17 POWDER, FOR SOLUTION ORAL at 11:47

## 2021-04-03 RX ADMIN — SODIUM CHLORIDE 40 MG: 9 INJECTION INTRAMUSCULAR; INTRAVENOUS; SUBCUTANEOUS at 11:46

## 2021-04-03 RX ADMIN — PANTOPRAZOLE SODIUM 40 MG: 40 TABLET, DELAYED RELEASE ORAL at 17:54

## 2021-04-03 RX ADMIN — Medication 10 ML: at 17:54

## 2021-04-03 RX ADMIN — DOCUSATE SODIUM 50 MG AND SENNOSIDES 8.6 MG 2 TABLET: 8.6; 5 TABLET, FILM COATED ORAL at 11:47

## 2021-04-03 NOTE — PROGRESS NOTES
I have reviewed and agree with the charting of senior nursing student, [facility]. Bedside shift change report given to Denny Martin RN (oncoming nurse) by Ant Kahn RN (offgoing nurse).  Report included the following information SBAR, Kardex, Intake/Output, MAR and Recent Results. '

## 2021-04-03 NOTE — PROGRESS NOTES
Bedside and Verbal shift change report given to [address] [facility] Brittney and chava  (oncoming nurse) by Laine Wesley (offgoing nurse). Report included the following information SBAR and Kardex.

## 2021-04-03 NOTE — PROGRESS NOTES
[address] [facility] Adult  Hospitalist Group                                                                                          Hospitalist Progress Note  April Rodriguez MD  Answering service: [phone] OR 2016 from in house phone        Date of Service:  4/3/2021  NAME:  Benita Chery  :  1945  MRN:  [id number]    This documentation was facilitated by a Voice Recognition software and may contain inadvertent typographical errors. Admission Summary:   Patient was brought to the ED with nausea, vomiting and lower abdominal pain. She was recently hospitalized here from 3/193/22 when she was treated for sepsis secondary to pneumonia and CHATO. Interval history / Subjective:        F/u for severe constipation   There is no new complaints  She is a poor historian. She had 2 episodes of BM recorded     Assessment & Plan:   Severe constipation/Obstipation  · CAT scan of the abdomen pelvis showed rectal fecal impaction, diffuse constipation with associated stercoral proctitis and a small volume of ascites, mild right hydronephrosis secondary to rectal fecal impaction.    -  Continue miralax 17 gm twice daily, pericolace and other stool regimen  · Enema. · She is now moving her bowels. KUB showed decreased volume of stool within the rectal sigmoid with persistent large volume of stool within the right and transverse colon  - Xray barium swallow with video ordered. - F/u with GI  - Continue GI lite diet     Mild obstructive right hydronephrosis due to constipation. · We will reassess with renal ultrasound once constipation resolves. Outpatient    ? Coffee-ground emesis  : Monitor hemoglobin daily, IV PPI. H/o LA grade C esophagitis on EGD 2020 - on BID PPI.         Mild hypokalemia:   Continue to replace and monitor      History of dysphagia, esophageal stenosis status post dilatation in 2020, per daughter, she has only been eating liquid/soft foods at home.   Gastroenterology on board  plan for outpatient MBS     Recent sepsis secondary to pneumonia. Chest x-ray on admission showed improvement of the airspace disease      Code status: Full code  DVT prophylaxis: SCD  Care Plan discussed with: Patient/Family and Nurse   Care plan discussed with daughter on the phone  Anticipated Disposition: Home w/Family  Anticipated Discharge: Greater than 48 hours  Hospital Problems  Date Reviewed: 3/19/2021          Codes Class Noted POA    Intractable vomiting ICD-10-CM: R11.10  ICD-9-CM: 536.2  3/29/2021 Unknown        Fecal impaction ([facility] [address] ICD-10-CM: K56.41  ICD-9-CM: 560.32  3/29/2021 Unknown                Review of Systems:   A comprehensive review of systems was negative except for that written in the HPI. Vital Signs:    Last 24hrs VS reviewed since prior progress note. Most recent are:  Visit Vitals  BP (!) 145/69 (BP 1 Location: Left arm, BP Patient Position: At rest)   Pulse 74   Temp 98.5 °F (36.9 °C)   Resp 16   Ht 5' 4\" (1.626 m)   Wt 59 kg (130 lb)   SpO2 97%   BMI 22.31 kg/m²         Intake/Output Summary (Last 24 hours) at 4/3/2021 1715  Last data filed at 4/3/2021 1200  Gross per 24 hour   Intake    Output 100 ml   Net -100 ml        Physical Examination:     I had a face to face encounter with this patient and independently examined them on4/3/2021 as outlined below:        Constitutional:  Appears uncomfortable. Not in distress   HEENT:  Atraumatic. Oral mucosa moist,. Non icteric sclera. No pallor. Resp:  CTA bilaterally. No wheezing/rhonchi/rales. No accessory muscle use   Chest Wall: No deformity   CV:  Regular rhythm, normal rate, no murmurs, gallops, rubs    GI:  Abdomen soft, normoactive, nontender. Digital rectal exam with the rectum full of soft stool, with small blood at the end of the exam, patient had external hemorrhoids, sphincters are intact.     :  No CVA or suprapubic tenderness    Musculoskeletal:  No edema, warm, 2+ pulses throughout    Neurologic: Mental status:AAOx3,   Cranial nerves II-XII : WNL  Motor exam:Moves all extremities symmetrically              Data Review:    Review and/or order of clinical lab test  Review and/or order of tests in the radiology section of CPT  Review and/or order of tests in the medicine section of CPT      Labs:     No results for input(s): WBC, HGB, HCT, PLT, HGBEXT, HCTEXT, PLTEXT, HGBEXT, HCTEXT, PLTEXT in the last 72 hours. Recent Labs     04/02/21  0459      K 3.6   *   CO2 23   BUN 6   CREA 0.72   *   CA 7.3*     No results for input(s): ALT, AP, TBIL, TBILI, TP, ALB, GLOB, GGT, AML, LPSE in the last 72 hours. No lab exists for component: SGOT, GPT, AMYP, HLPSE  No results for input(s): INR, PTP, APTT, INREXT, INREXT in the last 72 hours. No results for input(s): FE, TIBC, PSAT, FERR in the last 72 hours. No results found for: FOL, RBCF   No results for input(s): PH, PCO2, PO2 in the last 72 hours. No results for input(s): CPK, CKNDX, TROIQ in the last 72 hours.     No lab exists for component: CPKMB  Lab Results   Component Value Date/Time    Cholesterol, total 134 09/30/2014 05:00 AM    HDL Cholesterol 74 09/30/2014 05:00 AM    LDL, calculated 54.2 09/30/2014 05:00 AM    Triglyceride 29 09/30/2014 05:00 AM    CHOL/HDL Ratio 1.8 09/30/2014 05:00 AM     Lab Results   Component Value Date/Time    Glucose (POC) 81 04/03/2021 04:03 PM    Glucose (POC) 113 (H) 04/03/2021 11:37 AM    Glucose (POC) 118 (H) 04/02/2021 06:24 PM    Glucose (POC) 95 04/02/2021 04:17 PM    Glucose (POC) 111 (H) 04/02/2021 05:15 AM     Lab Results   Component Value Date/Time    Color YELLOW/STRAW 03/19/2021 02:12 AM    Appearance CLEAR 03/19/2021 02:12 AM    Specific gravity <1.005 03/19/2021 02:12 AM    Specific gravity 1.021 12/16/2020 01:48 PM    pH (UA) 6.0 03/19/2021 02:12 AM    Protein Negative 03/19/2021 02:12 AM    Glucose Negative 03/19/2021 02:12 AM    Ketone Negative 03/19/2021 02:12 AM    Bilirubin Negative 03/19/2021 02:12 AM    Urobilinogen 1.0 03/19/2021 02:12 AM    Nitrites Negative 03/19/2021 02:12 AM    Leukocyte Esterase SMALL (A) 03/19/2021 02:12 AM    Epithelial cells FEW 03/19/2021 02:12 AM    Bacteria 1+ (A) 03/19/2021 02:12 AM    WBC 20-50 03/19/2021 02:12 AM    RBC 10-20 03/19/2021 02:12 AM         Medications Reviewed:     Current Facility-Administered Medications   Medication Dose Route Frequency    pantoprazole (PROTONIX) tablet 40 mg  40 mg Oral BID    glucose chewable tablet 16 g  4 Tab Oral PRN    dextrose (D50W) injection syrg 12.5-25 g  12.5-25 g IntraVENous PRN    glucagon (GLUCAGEN) injection 1 mg  1 mg IntraMUSCular PRN    polyethylene glycol (MIRALAX) packet 17 g  17 g Oral BID    senna-docusate (PERICOLACE) 8.6-50 mg per tablet 2 Tab  2 Tab Oral DAILY    sodium chloride (NS) flush 5-40 mL  5-40 mL IntraVENous Q8H    sodium chloride (NS) flush 5-40 mL  5-40 mL IntraVENous PRN    acetaminophen (TYLENOL) tablet 650 mg  650 mg Oral Q6H PRN    Or    acetaminophen (TYLENOL) suppository 650 mg  650 mg Rectal Q6H PRN    promethazine (PHENERGAN) tablet 12.5 mg  12.5 mg Oral Q6H PRN    Or    ondansetron (ZOFRAN) injection 4 mg  4 mg IntraVENous Q6H PRN    bisacodyL (DULCOLAX) suppository 10 mg  10 mg Rectal DAILY PRN     ______________________________________________________________________  EXPECTED LENGTH OF STAY: 3d 4h  ACTUAL LENGTH OF STAY:          3                 Pedro Luis Potter MD

## 2021-04-04 LAB
ANION GAP SERPL CALC-SCNC: 4 MMOL/L (ref 5–15)
BUN SERPL-MCNC: 10 MG/DL (ref 6–20)
BUN/CREAT SERPL: 14 (ref 12–20)
CALCIUM SERPL-MCNC: 7.8 MG/DL (ref 8.5–10.1)
CHLORIDE SERPL-SCNC: 112 MMOL/L (ref 97–108)
CO2 SERPL-SCNC: 24 MMOL/L (ref 21–32)
COMMENT, HOLDF: NORMAL
CREAT SERPL-MCNC: 0.69 MG/DL (ref 0.55–1.02)
GLUCOSE BLD STRIP.AUTO-MCNC: 117 MG/DL (ref 65–100)
GLUCOSE BLD STRIP.AUTO-MCNC: 70 MG/DL (ref 65–100)
GLUCOSE BLD STRIP.AUTO-MCNC: 80 MG/DL (ref 65–100)
GLUCOSE SERPL-MCNC: 81 MG/DL (ref 65–100)
POTASSIUM SERPL-SCNC: 3.4 MMOL/L (ref 3.5–5.1)
SAMPLES BEING HELD,HOLD: NORMAL
SERVICE CMNT-IMP: ABNORMAL
SERVICE CMNT-IMP: NORMAL
SERVICE CMNT-IMP: NORMAL
SODIUM SERPL-SCNC: 140 MMOL/L (ref 136–145)

## 2021-04-04 PROCEDURE — 82962 GLUCOSE BLOOD TEST: CPT

## 2021-04-04 PROCEDURE — 74011250637 HC RX REV CODE- 250/637: Performed by: FAMILY MEDICINE

## 2021-04-04 PROCEDURE — 74011250637 HC RX REV CODE- 250/637: Performed by: HOSPITALIST

## 2021-04-04 PROCEDURE — 74011250636 HC RX REV CODE- 250/636: Performed by: HOSPITALIST

## 2021-04-04 PROCEDURE — 80048 BASIC METABOLIC PNL TOTAL CA: CPT

## 2021-04-04 PROCEDURE — 74011250637 HC RX REV CODE- 250/637: Performed by: PHYSICIAN ASSISTANT

## 2021-04-04 PROCEDURE — 65270000032 HC RM SEMIPRIVATE

## 2021-04-04 PROCEDURE — 36415 COLL VENOUS BLD VENIPUNCTURE: CPT

## 2021-04-04 RX ORDER — POTASSIUM CHLORIDE 7.45 MG/ML
10 INJECTION INTRAVENOUS ONCE
Status: COMPLETED | OUTPATIENT
Start: 2021-04-04 | End: 2021-04-04

## 2021-04-04 RX ADMIN — PANTOPRAZOLE SODIUM 40 MG: 40 TABLET, DELAYED RELEASE ORAL at 08:53

## 2021-04-04 RX ADMIN — PANTOPRAZOLE SODIUM 40 MG: 40 TABLET, DELAYED RELEASE ORAL at 19:36

## 2021-04-04 RX ADMIN — Medication 10 ML: at 19:37

## 2021-04-04 RX ADMIN — DOCUSATE SODIUM 50 MG AND SENNOSIDES 8.6 MG 2 TABLET: 8.6; 5 TABLET, FILM COATED ORAL at 08:53

## 2021-04-04 RX ADMIN — POLYETHYLENE GLYCOL 3350 17 G: 17 POWDER, FOR SOLUTION ORAL at 19:35

## 2021-04-04 RX ADMIN — ACETAMINOPHEN 650 MG: 325 TABLET ORAL at 19:40

## 2021-04-04 RX ADMIN — POTASSIUM CHLORIDE 10 MEQ: 7.46 INJECTION, SOLUTION INTRAVENOUS at 08:22

## 2021-04-04 RX ADMIN — POLYETHYLENE GLYCOL 3350 17 G: 17 POWDER, FOR SOLUTION ORAL at 08:53

## 2021-04-04 NOTE — PROGRESS NOTES
[address] [facility] Adult  Hospitalist Group                                                                                          Hospitalist Progress Note  Lucero Strauss MD  Answering service: [phone] from in house phone        Date of Service:  2021  NAME:  Benita Chery  :  1945  MRN:  [id number]      Admission Summary:     Patient was brought to the ED with nausea, vomiting and lower abdominal pain. She was recently hospitalized here from 3/193/22 when she was treated for sepsis secondary to pneumonia and CHATO. Interval history / Subjective:     Patient is poor historian, hearing impairment, she said she had bowel movement today     Assessment & Plan:     Severe constipation/Obstipation  -CAT scan of the abdomen pelvis showed rectal fecal impaction, diffuse constipation with associated stercoral proctitis and a small volume of ascites, mild right hydronephrosis secondary to rectal fecal impaction.  -Continue miralax 17 gm twice daily, pericolace and other stool regimen  -Enema.  -She is now moving her bowels. KUB showed decreased volume of stool within the rectal sigmoid with persistent large volume of stool within the right and transverse colon  - Xray barium swallow with video ordered. - F/u with GI  - Continue GI lite diet     Mild obstructive right hydronephrosis due to constipation.  -creatinine normal   -continue pericolace  -monitor urine output     Coffee-ground emesis  -on iv protonix  -Hgb 11.6  -H/o LA grade C esophagitis on EGD 2020   -monitor H/H     Mild hypokalemia:  -replace with kcl and repeat k level in am    Hx of dysphagia, esophageal stenosis status post dilatation in 2020,   -per daughter, she has only been eating liquid/soft foods at home.  -plan for outpatient MBS   -GI on board     Recent sepsis secondary to pneumonia.     -Chest x-ray on admission showed improvement of the airspace disease         Code status: Full Code  DVT prophylaxis:SCD    Care Plan discussed with: Patient/Family and Nurse  Anticipated Disposition: Home w/Family  Anticipated Discharge: Greater than 48 hours     Hospital Problems  Date Reviewed: 3/19/2021          Codes Class Noted POA    Intractable vomiting ICD-10-CM: R11.10  ICD-9-CM: 536.2  3/29/2021 Unknown        Fecal impaction ([facility] [address] ICD-10-CM: K56.41  ICD-9-CM: 560.32  3/29/2021 Unknown                 Vital Signs:    Last 24hrs VS reviewed since prior progress note. Most recent are:  Visit Vitals  /69   Pulse 70   Temp 97.4 °F (36.3 °C)   Resp 18   Ht 5' 4\" (1.626 m)   Wt 59 kg (130 lb)   SpO2 96%   BMI 22.31 kg/m²       No intake or output data in the 24 hours ending 04/04/21 1453     Physical Examination:     I had a face to face encounter with this patient and independently examined them on 4/4/2021 as outlined below:          Constitutional:  No acute distress, cooperative, pleasant    ENT:  Oral mucosa moist, oropharynx benign. Resp:  CTA bilaterally. No wheezing/rhonchi/rales. No accessory muscle use   CV:  Regular rhythm, normal rate, no murmurs, gallops, rubs    GI:  Soft, non distended, non tender. normoactive bowel sounds, no hepatosplenomegaly     Musculoskeletal:  No edema,     Neurologic:  Conscious and alert, oriented to place, person and time, Moves all extremities, CN II-XII reviewed     Skin:  Good turgor, no rashes or ulcers       Data Review:    Review and/or order of clinical lab test  Review and/or order of tests in the radiology section of CPT  Review and/or order of tests in the medicine section of CPT      Labs:   No results for input(s): WBC, HGB, HCT, PLT, HGBEXT, HCTEXT, PLTEXT, HGBEXT, HCTEXT, PLTEXT in the last 72 hours.   Recent Labs     04/04/21  0500 04/02/21  0459    143   K 3.4* 3.6   * 115*   CO2 24 23   BUN 10 6   CREA 0.69 0.72   GLU 81 111*   CA 7.8* 7.3*     No results for input(s): ALT, AP, TBIL, TBILI, TP, ALB, GLOB, GGT, AML, LPSE in the last 72 hours.    No lab exists for component: SGOT, GPT, AMYP, HLPSE  No results for input(s): INR, PTP, APTT, INREXT, INREXT in the last 72 hours. No results for input(s): FE, TIBC, PSAT, FERR in the last 72 hours. No results found for: FOL, RBCF   No results for input(s): PH, PCO2, PO2 in the last 72 hours. No results for input(s): CPK, CKNDX, TROIQ in the last 72 hours.     No lab exists for component: CPKMB  Lab Results   Component Value Date/Time    Cholesterol, total 134 09/30/2014 05:00 AM    HDL Cholesterol 74 09/30/2014 05:00 AM    LDL, calculated 54.2 09/30/2014 05:00 AM    Triglyceride 29 09/30/2014 05:00 AM    CHOL/HDL Ratio 1.8 09/30/2014 05:00 AM     Lab Results   Component Value Date/Time    Glucose (POC) 70 04/04/2021 11:33 AM    Glucose (POC) 80 04/04/2021 05:15 AM    Glucose (POC) 95 04/03/2021 11:19 PM    Glucose (POC) 81 04/03/2021 04:03 PM    Glucose (POC) 113 (H) 04/03/2021 11:37 AM     Lab Results   Component Value Date/Time    Color YELLOW/STRAW 03/19/2021 02:12 AM    Appearance CLEAR 03/19/2021 02:12 AM    Specific gravity <1.005 03/19/2021 02:12 AM    Specific gravity 1.021 12/16/2020 01:48 PM    pH (UA) 6.0 03/19/2021 02:12 AM    Protein Negative 03/19/2021 02:12 AM    Glucose Negative 03/19/2021 02:12 AM    Ketone Negative 03/19/2021 02:12 AM    Bilirubin Negative 03/19/2021 02:12 AM    Urobilinogen 1.0 03/19/2021 02:12 AM    Nitrites Negative 03/19/2021 02:12 AM    Leukocyte Esterase SMALL (A) 03/19/2021 02:12 AM    Epithelial cells FEW 03/19/2021 02:12 AM    Bacteria 1+ (A) 03/19/2021 02:12 AM    WBC 20-50 03/19/2021 02:12 AM    RBC 10-20 03/19/2021 02:12 AM         Medications Reviewed:     Current Facility-Administered Medications   Medication Dose Route Frequency    pantoprazole (PROTONIX) tablet 40 mg  40 mg Oral BID    glucose chewable tablet 16 g  4 Tab Oral PRN    dextrose (D50W) injection syrg 12.5-25 g  12.5-25 g IntraVENous PRN    glucagon (GLUCAGEN) injection 1 mg  1 mg IntraMUSCular PRN    polyethylene glycol (MIRALAX) packet 17 g  17 g Oral BID    senna-docusate (PERICOLACE) 8.6-50 mg per tablet 2 Tab  2 Tab Oral DAILY    sodium chloride (NS) flush 5-40 mL  5-40 mL IntraVENous Q8H    sodium chloride (NS) flush 5-40 mL  5-40 mL IntraVENous PRN    acetaminophen (TYLENOL) tablet 650 mg  650 mg Oral Q6H PRN    Or    acetaminophen (TYLENOL) suppository 650 mg  650 mg Rectal Q6H PRN    promethazine (PHENERGAN) tablet 12.5 mg  12.5 mg Oral Q6H PRN    Or    ondansetron (ZOFRAN) injection 4 mg  4 mg IntraVENous Q6H PRN    bisacodyL (DULCOLAX) suppository 10 mg  10 mg Rectal DAILY PRN     ______________________________________________________________________  EXPECTED LENGTH OF STAY: 3d 4h  ACTUAL LENGTH OF STAY:          4                 Myron Bedolla MD

## 2021-04-04 NOTE — PROGRESS NOTES
Bedside shift change report given to Dionne Zuleta RN (oncoming nurse) by Amrik Gutierrez RN (offgoing nurse). Report included the following information SBAR, Kardex, Intake/Output, MAR and Recent Results.

## 2021-04-05 ENCOUNTER — APPOINTMENT (OUTPATIENT)
Dept: GENERAL RADIOLOGY | Age: 76
DRG: 389 | End: 2021-04-05
Attending: NURSE PRACTITIONER
Payer: MEDICARE

## 2021-04-05 ENCOUNTER — APPOINTMENT (OUTPATIENT)
Dept: GENERAL RADIOLOGY | Age: 76
DRG: 389 | End: 2021-04-05
Attending: INTERNAL MEDICINE
Payer: MEDICARE

## 2021-04-05 LAB
ALBUMIN SERPL-MCNC: 2.2 G/DL (ref 3.5–5)
ALBUMIN/GLOB SERPL: 0.6 {RATIO} (ref 1.1–2.2)
ALP SERPL-CCNC: 198 U/L (ref 45–117)
ALT SERPL-CCNC: 18 U/L (ref 12–78)
ANION GAP SERPL CALC-SCNC: 4 MMOL/L (ref 5–15)
AST SERPL-CCNC: 23 U/L (ref 15–37)
BILIRUB SERPL-MCNC: 0.4 MG/DL (ref 0.2–1)
BUN SERPL-MCNC: 14 MG/DL (ref 6–20)
BUN/CREAT SERPL: 17 (ref 12–20)
CALCIUM SERPL-MCNC: 8.7 MG/DL (ref 8.5–10.1)
CHLORIDE SERPL-SCNC: 110 MMOL/L (ref 97–108)
CO2 SERPL-SCNC: 26 MMOL/L (ref 21–32)
CREAT SERPL-MCNC: 0.83 MG/DL (ref 0.55–1.02)
ERYTHROCYTE [DISTWIDTH] IN BLOOD BY AUTOMATED COUNT: 14.2 % (ref 11.5–14.5)
GLOBULIN SER CALC-MCNC: 3.6 G/DL (ref 2–4)
GLUCOSE BLD STRIP.AUTO-MCNC: 84 MG/DL (ref 65–100)
GLUCOSE BLD STRIP.AUTO-MCNC: 99 MG/DL (ref 65–100)
GLUCOSE SERPL-MCNC: 90 MG/DL (ref 65–100)
HCT VFR BLD AUTO: 34.4 % (ref 35–47)
HGB BLD-MCNC: 11.3 G/DL (ref 11.5–16)
MCH RBC QN AUTO: 31.1 PG (ref 26–34)
MCHC RBC AUTO-ENTMCNC: 32.8 G/DL (ref 30–36.5)
MCV RBC AUTO: 94.8 FL (ref 80–99)
NRBC # BLD: 0 K/UL (ref 0–0.01)
NRBC BLD-RTO: 0 PER 100 WBC
PLATELET # BLD AUTO: 150 K/UL (ref 150–400)
PMV BLD AUTO: 10.7 FL (ref 8.9–12.9)
POTASSIUM SERPL-SCNC: 4.2 MMOL/L (ref 3.5–5.1)
PROT SERPL-MCNC: 5.8 G/DL (ref 6.4–8.2)
RBC # BLD AUTO: 3.63 M/UL (ref 3.8–5.2)
SERVICE CMNT-IMP: NORMAL
SERVICE CMNT-IMP: NORMAL
SODIUM SERPL-SCNC: 140 MMOL/L (ref 136–145)
WBC # BLD AUTO: 4.5 K/UL (ref 3.6–11)

## 2021-04-05 PROCEDURE — 85027 COMPLETE CBC AUTOMATED: CPT

## 2021-04-05 PROCEDURE — 74220 X-RAY XM ESOPHAGUS 1CNTRST: CPT

## 2021-04-05 PROCEDURE — 74011250637 HC RX REV CODE- 250/637: Performed by: HOSPITALIST

## 2021-04-05 PROCEDURE — 97116 GAIT TRAINING THERAPY: CPT

## 2021-04-05 PROCEDURE — 36415 COLL VENOUS BLD VENIPUNCTURE: CPT

## 2021-04-05 PROCEDURE — 82962 GLUCOSE BLOOD TEST: CPT

## 2021-04-05 PROCEDURE — 74011250637 HC RX REV CODE- 250/637: Performed by: PHYSICIAN ASSISTANT

## 2021-04-05 PROCEDURE — 80053 COMPREHEN METABOLIC PANEL: CPT

## 2021-04-05 PROCEDURE — 65270000032 HC RM SEMIPRIVATE

## 2021-04-05 RX ADMIN — DOCUSATE SODIUM 50 MG AND SENNOSIDES 8.6 MG 2 TABLET: 8.6; 5 TABLET, FILM COATED ORAL at 13:02

## 2021-04-05 RX ADMIN — Medication 10 ML: at 05:01

## 2021-04-05 RX ADMIN — POLYETHYLENE GLYCOL 3350 17 G: 17 POWDER, FOR SOLUTION ORAL at 13:02

## 2021-04-05 RX ADMIN — Medication 10 ML: at 13:02

## 2021-04-05 RX ADMIN — PANTOPRAZOLE SODIUM 40 MG: 40 TABLET, DELAYED RELEASE ORAL at 13:02

## 2021-04-05 NOTE — PROGRESS NOTES
I evaluated this patient, key findings were discussed with the NP, key components of medical care were performed. Please see NP note for details.  Agree with NP assessment and Plan

## 2021-04-05 NOTE — PROGRESS NOTES
SLP Contact Note    Discussed with NP. Will proceed with esophagram and not MBS to address pt's hx of esophageal dysphagia.       Thank you,  ALISSON WilderEd, [address] [facility]  Speech-Language Pathologist

## 2021-04-05 NOTE — PROGRESS NOTES
Bedside and Verbal shift change report given to Jillian Muhammad RN (oncoming nurse) by Mounika Metzger RN (offgoing nurse). Report included the following information SBAR, Kardex, ED Summary, Intake/Output, MAR and Recent Results.

## 2021-04-05 NOTE — PROGRESS NOTES
Problem: Mobility Impaired (Adult and Pediatric)  Goal: *Acute Goals and Plan of Care (Insert Text)  Description: FUNCTIONAL STATUS PRIOR TO ADMISSION: Patient was modified independent using a single point cane when out in the community or furniture surfer at home for functional mobility. HOME SUPPORT PRIOR TO ADMISSION: The patient lived with her daughter and son-in-law with no assistance needed for mobility. Physical Therapy Goals  Initiated 3/30/2021  1. Patient will move from supine to sit and sit to supine  in bed with independence within 7 day(s). 2.  Patient will transfer from bed to chair and chair to bed with modified independence using the least restrictive device within 7 day(s). 3.  Patient will perform sit to stand with modified independence within 7 day(s). 4.  Patient will ambulate with modified independence for 150 feet with the least restrictive device within 7 day(s). Outcome: Progressing Towards Goal     PHYSICAL THERAPY TREATMENT  Patient: Joan Arias (60 y.o. female)  Date: 4/5/2021  Diagnosis: Intractable vomiting [R11.10]  Fecal impaction (HCC) [K56.41] <principal problem not specified>       Precautions: Fall(very [place])  Chart, physical therapy assessment, plan of care and goals were reviewed. ASSESSMENT  Patient continues with skilled PT services and is progressing towards goals. Patient ambulating into the bathroom and then agreeable to ambulating in the hallway with HHA. Patient has a tendency to furniture walk and use railing in the hallway. Patient admits to having a small apartment where she can use furniture to get around safely. Educated on increased fall risk. Discussed using a RW, particularly if she goes out into the community. Patient does not seem interested in using a device in her home, however she is willing to trial one next treatment.  Suspect she is approaching functional baseline, however will follow up tomorrow as appropriate for RW vs rollator assessment. If Patient discharges before then, she can follow up for device prescription with HHPT     Current Level of Function Impacting Discharge (mobility/balance): HHA, CGA  for ambulating in unfamiliar environment    Other factors to consider for discharge: good family support         PLAN :  Patient continues to benefit from skilled intervention to address the above impairments. Continue treatment per established plan of care. to address goals. Recommendation for discharge: (in order for the patient to meet his/her long term goals)  Physical therapy at least 2 days/week in the home     This discharge recommendation:  Has not yet been discussed the attending provider and/or case management    IF patient discharges home will need the following DME: to be determined (TBD)       SUBJECTIVE:   Patient stated I did pretty well today? Mingo Saez    OBJECTIVE DATA SUMMARY:   Critical Behavior:  Neurologic State: Alert, Appropriate for age  Orientation Level: Oriented X4  Cognition: Follows commands  Safety/Judgement: Awareness of environment, Fall prevention, Insight into deficits  Functional Mobility Training:  Bed Mobility:  Supine to Sit: Supervision  Sit to Supine: Supervision    Transfers:  Sit to Stand: Stand-by assistance  Stand to Sit: Stand-by assistance    Balance:  Sitting: Intact  Standing: Impaired  Standing - Static: Good  Standing - Dynamic : Fair    Ambulation/Gait Training:  Distance (ft): 150 Feet (ft)  Assistive Device: Gait belt  Ambulation - Level of Assistance: Contact guard assistance(HHA)  Gait Abnormalities: Decreased step clearance  Speed/Yadira: Slow    Activity Tolerance:   Good and SpO2 stable on RA    After treatment patient left in no apparent distress:   Supine in bed and Call bell within reach    COMMUNICATION/COLLABORATION:   The patients plan of care was discussed with: Registered nurse.      René Trinidad, PT, DPT  Geriatric Clinical Specialist     Time Calculation: 14 mins

## 2021-04-05 NOTE — PROGRESS NOTES
[facility] Adult  Hospitalist Group                                                                                          Hospitalist Progress Note  Donnella Primrose, NP  Answering service: [phone] or [phone] from in house phone        Date of Service:  2021  NAME:  Henrietta Duane  :  1945  MRN:  [id number]      Admission Summary:   Patient was brought to the ED with nausea, vomiting and lower abdominal pain. She was recently hospitalized here from 3/193/22 when she was treated for sepsis secondary to pneumonia and CHATO. Interval history / Subjective:    Seen and examined patient sitting up in bed. Hard of hearing. States that she is ok. No overnight events noted.  No new complaints     Assessment & Plan:     Severe constipation/Obstipation  -CAT scan of the abdomen pelvis showed rectal fecal impaction, diffuse constipation with associated stercoral proctitis and a small volume of ascites, mild right hydronephrosis secondary to rectal fecal impaction.  -Continue miralax 17 gm twice daily, pericolace and other stool regimen  -Enema.  -She is now moving her bowels.  KUB showed decreased volume of stool within the rectal sigmoid with persistent large volume of stool within the right and transverse colon  - F/u with GI  - Continue GI lite diet     Mild obstructive right hydronephrosis due to constipation.  -creatinine normal   -continue pericolace  -monitor urine output     Coffee-ground emesis  -on iv protonix  -Hgb 11.6  -H/o LA grade C esophagitis on EGD 2020   -monitor H/H     Mild hypokalemia:  - Resolved  - Monitor labs     Hx of dysphagia, esophageal stenosis status post dilatation in 2020,   -per daughter, she has only been eating liquid/soft foods at home.  -Esophogram completed this AM- Discussed results with GI, appreciate recommendations.      Recent sepsis secondary to pneumonia.    -Chest x-ray on admission showed improvement of the airspace disease      Code status: Full   DVT prophylaxis: SCD    Care Plan discussed with: Patient/Family, Nurse and   Anticipated Disposition: Home w/Family  Anticipated Discharge: 24 hours to 48 hours     [id number]: Spoke with patient's daughter Austin Quintana ([phone]). Updated her on patient's condition and plan of care. Questions answered. Hospital Problems  Date Reviewed: 3/19/2021          Codes Class Noted POA    Intractable vomiting ICD-10-CM: R11.10  ICD-9-CM: 536.2  3/29/2021 Unknown        Fecal impaction [facility]) ICD-10-CM: K56.41  ICD-9-CM: 560.32  3/29/2021 Unknown                Review of Systems:   A comprehensive review of systems was negative except for that written in the HPI. Vital Signs:    Last 24hrs VS reviewed since prior progress note. Most recent are:  Visit Vitals  /64   Pulse 74   Temp 97.9 °F (36.6 °C)   Resp 18   Ht 5' 4\" (1.626 m)   Wt 59 kg (130 lb)   SpO2 95%   BMI 22.31 kg/m²       No intake or output data in the 24 hours ending 04/05/21 1642     Physical Examination:             Constitutional:  No acute distress, cooperative, pleasant,    ENT:  Oral mucosa moist, oropharynx benign. Resp:  CTA bilaterally. No wheezing/rhonchi/rales. No accessory muscle use   CV:  Regular rhythm, normal rate, no murmurs, gallops, rubs    GI:  Soft, non distended, non tender. normoactive bowel sounds    Musculoskeletal:  No edema, warm, 2+ pulses throughout    Neurologic:  Moves all extremities.   AAOx 2, CN II-XII reviewed     Psych:  Not anxious or agitated       Data Review:    Review and/or order of clinical lab test  Review and/or order of tests in the radiology section of CPT  Review and/or order of tests in the medicine section of CPT      Labs:     Recent Labs     04/05/21  0142   WBC 4.5   HGB 11.3*   HCT 34.4*        Recent Labs     04/05/21  0142 04/04/21  0500    140   K 4.2 3.4*   * 112*   CO2 26 24   BUN 14 10   CREA 0.83 0.69   GLU 90 81   CA 8.7 7.8*     Recent Labs 04/05/21  0142   ALT 18   *   TBILI 0.4   TP 5.8*   ALB 2.2*   GLOB 3.6     No results for input(s): INR, PTP, APTT, INREXT in the last 72 hours. No results for input(s): FE, TIBC, PSAT, FERR in the last 72 hours. No results found for: FOL, RBCF   No results for input(s): PH, PCO2, PO2 in the last 72 hours. No results for input(s): CPK, CKNDX, TROIQ in the last 72 hours.     No lab exists for component: CPKMB  Lab Results   Component Value Date/Time    Cholesterol, total 134 09/30/2014 05:00 AM    HDL Cholesterol 74 09/30/2014 05:00 AM    LDL, calculated 54.2 09/30/2014 05:00 AM    Triglyceride 29 09/30/2014 05:00 AM    CHOL/HDL Ratio 1.8 09/30/2014 05:00 AM     Lab Results   Component Value Date/Time    Glucose (POC) 84 04/05/2021 06:23 AM    Glucose (POC) 99 04/05/2021 12:23 AM    Glucose (POC) 117 (H) 04/04/2021 04:35 PM    Glucose (POC) 70 04/04/2021 11:33 AM    Glucose (POC) 80 04/04/2021 05:15 AM     Lab Results   Component Value Date/Time    Color YELLOW/STRAW 03/19/2021 02:12 AM    Appearance CLEAR 03/19/2021 02:12 AM    Specific gravity <1.005 03/19/2021 02:12 AM    Specific gravity 1.021 12/16/2020 01:48 PM    pH (UA) 6.0 03/19/2021 02:12 AM    Protein Negative 03/19/2021 02:12 AM    Glucose Negative 03/19/2021 02:12 AM    Ketone Negative 03/19/2021 02:12 AM    Bilirubin Negative 03/19/2021 02:12 AM    Urobilinogen 1.0 03/19/2021 02:12 AM    Nitrites Negative 03/19/2021 02:12 AM    Leukocyte Esterase SMALL (A) 03/19/2021 02:12 AM    Epithelial cells FEW 03/19/2021 02:12 AM    Bacteria 1+ (A) 03/19/2021 02:12 AM    WBC 20-50 03/19/2021 02:12 AM    RBC 10-20 03/19/2021 02:12 AM         Medications Reviewed:     Current Facility-Administered Medications   Medication Dose Route Frequency    pantoprazole (PROTONIX) tablet 40 mg  40 mg Oral BID    glucose chewable tablet 16 g  4 Tab Oral PRN    dextrose (D50W) injection syrg 12.5-25 g  12.5-25 g IntraVENous PRN    glucagon (GLUCAGEN) injection 1 mg 1 mg IntraMUSCular PRN    polyethylene glycol (MIRALAX) packet 17 g  17 g Oral BID    senna-docusate (PERICOLACE) 8.6-50 mg per tablet 2 Tab  2 Tab Oral DAILY    sodium chloride (NS) flush 5-40 mL  5-40 mL IntraVENous Q8H    sodium chloride (NS) flush 5-40 mL  5-40 mL IntraVENous PRN    acetaminophen (TYLENOL) tablet 650 mg  650 mg Oral Q6H PRN    Or    acetaminophen (TYLENOL) suppository 650 mg  650 mg Rectal Q6H PRN    promethazine (PHENERGAN) tablet 12.5 mg  12.5 mg Oral Q6H PRN    Or    ondansetron (ZOFRAN) injection 4 mg  4 mg IntraVENous Q6H PRN    bisacodyL (DULCOLAX) suppository 10 mg  10 mg Rectal DAILY PRN     ______________________________________________________________________  EXPECTED LENGTH OF STAY: 3d 4h  ACTUAL LENGTH OF STAY:          5                 Zuleima Ritchie NP

## 2021-04-06 ENCOUNTER — ANESTHESIA (OUTPATIENT)
Dept: ENDOSCOPY | Age: 76
DRG: 389 | End: 2021-04-06
Payer: MEDICARE

## 2021-04-06 ENCOUNTER — ANESTHESIA EVENT (OUTPATIENT)
Dept: ENDOSCOPY | Age: 76
DRG: 389 | End: 2021-04-06
Payer: MEDICARE

## 2021-04-06 LAB
ANION GAP SERPL CALC-SCNC: 6 MMOL/L (ref 5–15)
BASOPHILS # BLD: 0.1 K/UL (ref 0–0.1)
BASOPHILS NFR BLD: 2 % (ref 0–1)
BUN SERPL-MCNC: 17 MG/DL (ref 6–20)
BUN/CREAT SERPL: 22 (ref 12–20)
CALCIUM SERPL-MCNC: 8.5 MG/DL (ref 8.5–10.1)
CHLORIDE SERPL-SCNC: 111 MMOL/L (ref 97–108)
CO2 SERPL-SCNC: 25 MMOL/L (ref 21–32)
CREAT SERPL-MCNC: 0.78 MG/DL (ref 0.55–1.02)
DIFFERENTIAL METHOD BLD: ABNORMAL
EOSINOPHIL # BLD: 0.1 K/UL (ref 0–0.4)
EOSINOPHIL NFR BLD: 3 % (ref 0–7)
ERYTHROCYTE [DISTWIDTH] IN BLOOD BY AUTOMATED COUNT: 14.1 % (ref 11.5–14.5)
GLUCOSE BLD STRIP.AUTO-MCNC: 102 MG/DL (ref 65–100)
GLUCOSE SERPL-MCNC: 105 MG/DL (ref 65–100)
HCT VFR BLD AUTO: 30.7 % (ref 35–47)
HGB BLD-MCNC: 10.2 G/DL (ref 11.5–16)
IMM GRANULOCYTES # BLD AUTO: 0 K/UL (ref 0–0.04)
IMM GRANULOCYTES NFR BLD AUTO: 0 % (ref 0–0.5)
LYMPHOCYTES # BLD: 1.3 K/UL (ref 0.8–3.5)
LYMPHOCYTES NFR BLD: 38 % (ref 12–49)
MCH RBC QN AUTO: 31.8 PG (ref 26–34)
MCHC RBC AUTO-ENTMCNC: 33.2 G/DL (ref 30–36.5)
MCV RBC AUTO: 95.6 FL (ref 80–99)
MONOCYTES # BLD: 0.5 K/UL (ref 0–1)
MONOCYTES NFR BLD: 14 % (ref 5–13)
NEUTS SEG # BLD: 1.5 K/UL (ref 1.8–8)
NEUTS SEG NFR BLD: 43 % (ref 32–75)
NRBC # BLD: 0 K/UL (ref 0–0.01)
NRBC BLD-RTO: 0 PER 100 WBC
PLATELET # BLD AUTO: 135 K/UL (ref 150–400)
PMV BLD AUTO: 10.5 FL (ref 8.9–12.9)
POTASSIUM SERPL-SCNC: 3.6 MMOL/L (ref 3.5–5.1)
RBC # BLD AUTO: 3.21 M/UL (ref 3.8–5.2)
SERVICE CMNT-IMP: ABNORMAL
SODIUM SERPL-SCNC: 142 MMOL/L (ref 136–145)
WBC # BLD AUTO: 3.4 K/UL (ref 3.6–11)

## 2021-04-06 PROCEDURE — 65270000032 HC RM SEMIPRIVATE

## 2021-04-06 PROCEDURE — 74011250637 HC RX REV CODE- 250/637: Performed by: PHYSICIAN ASSISTANT

## 2021-04-06 PROCEDURE — 97116 GAIT TRAINING THERAPY: CPT

## 2021-04-06 PROCEDURE — 77030021593 HC FCPS BIOP ENDOSC BSC -A: Performed by: SPECIALIST

## 2021-04-06 PROCEDURE — 85025 COMPLETE CBC W/AUTO DIFF WBC: CPT

## 2021-04-06 PROCEDURE — 82962 GLUCOSE BLOOD TEST: CPT

## 2021-04-06 PROCEDURE — 36415 COLL VENOUS BLD VENIPUNCTURE: CPT

## 2021-04-06 PROCEDURE — 80048 BASIC METABOLIC PNL TOTAL CA: CPT

## 2021-04-06 PROCEDURE — 0D738ZZ DILATION OF LOWER ESOPHAGUS, VIA NATURAL OR ARTIFICIAL OPENING ENDOSCOPIC: ICD-10-PCS | Performed by: SPECIALIST

## 2021-04-06 PROCEDURE — 74011250636 HC RX REV CODE- 250/636: Performed by: NURSE ANESTHETIST, CERTIFIED REGISTERED

## 2021-04-06 PROCEDURE — 74011250636 HC RX REV CODE- 250/636: Performed by: SPECIALIST

## 2021-04-06 PROCEDURE — 76060000031 HC ANESTHESIA FIRST 0.5 HR: Performed by: SPECIALIST

## 2021-04-06 PROCEDURE — 74011000250 HC RX REV CODE- 250: Performed by: NURSE ANESTHETIST, CERTIFIED REGISTERED

## 2021-04-06 PROCEDURE — 97530 THERAPEUTIC ACTIVITIES: CPT

## 2021-04-06 PROCEDURE — 0DB58ZX EXCISION OF ESOPHAGUS, VIA NATURAL OR ARTIFICIAL OPENING ENDOSCOPIC, DIAGNOSTIC: ICD-10-PCS | Performed by: SPECIALIST

## 2021-04-06 PROCEDURE — 88305 TISSUE EXAM BY PATHOLOGIST: CPT

## 2021-04-06 PROCEDURE — 77030018712 HC DEV BLLN INFL BSC -B: Performed by: SPECIALIST

## 2021-04-06 PROCEDURE — 2709999900 HC NON-CHARGEABLE SUPPLY: Performed by: SPECIALIST

## 2021-04-06 PROCEDURE — 74011250637 HC RX REV CODE- 250/637: Performed by: HOSPITALIST

## 2021-04-06 PROCEDURE — C1726 CATH, BAL DIL, NON-VASCULAR: HCPCS | Performed by: SPECIALIST

## 2021-04-06 PROCEDURE — 76040000019: Performed by: SPECIALIST

## 2021-04-06 RX ORDER — SODIUM CHLORIDE 9 MG/ML
50 INJECTION, SOLUTION INTRAVENOUS CONTINUOUS
Status: DISPENSED | OUTPATIENT
Start: 2021-04-06 | End: 2021-04-06

## 2021-04-06 RX ORDER — PROPOFOL 10 MG/ML
INJECTION, EMULSION INTRAVENOUS AS NEEDED
Status: DISCONTINUED | OUTPATIENT
Start: 2021-04-06 | End: 2021-04-06 | Stop reason: HOSPADM

## 2021-04-06 RX ORDER — NALOXONE HYDROCHLORIDE 0.4 MG/ML
0.4 INJECTION, SOLUTION INTRAMUSCULAR; INTRAVENOUS; SUBCUTANEOUS
Status: DISCONTINUED | OUTPATIENT
Start: 2021-04-06 | End: 2021-04-06 | Stop reason: HOSPADM

## 2021-04-06 RX ORDER — FLUMAZENIL 0.1 MG/ML
0.2 INJECTION INTRAVENOUS
Status: DISCONTINUED | OUTPATIENT
Start: 2021-04-06 | End: 2021-04-06 | Stop reason: HOSPADM

## 2021-04-06 RX ORDER — SODIUM CHLORIDE 9 MG/ML
INJECTION, SOLUTION INTRAVENOUS
Status: DISCONTINUED | OUTPATIENT
Start: 2021-04-06 | End: 2021-04-06 | Stop reason: HOSPADM

## 2021-04-06 RX ORDER — DEXTROMETHORPHAN/PSEUDOEPHED 2.5-7.5/.8
1.2 DROPS ORAL
Status: DISCONTINUED | OUTPATIENT
Start: 2021-04-06 | End: 2021-04-06 | Stop reason: HOSPADM

## 2021-04-06 RX ORDER — SODIUM CHLORIDE 0.9 % (FLUSH) 0.9 %
5-40 SYRINGE (ML) INJECTION AS NEEDED
Status: DISCONTINUED | OUTPATIENT
Start: 2021-04-06 | End: 2021-04-07 | Stop reason: HOSPADM

## 2021-04-06 RX ORDER — SODIUM CHLORIDE 0.9 % (FLUSH) 0.9 %
5-40 SYRINGE (ML) INJECTION EVERY 8 HOURS
Status: DISCONTINUED | OUTPATIENT
Start: 2021-04-06 | End: 2021-04-07 | Stop reason: HOSPADM

## 2021-04-06 RX ORDER — EPINEPHRINE 0.1 MG/ML
1 INJECTION INTRACARDIAC; INTRAVENOUS
Status: DISCONTINUED | OUTPATIENT
Start: 2021-04-06 | End: 2021-04-06 | Stop reason: HOSPADM

## 2021-04-06 RX ORDER — POLYETHYLENE GLYCOL 3350 17 G/17G
17 POWDER, FOR SOLUTION ORAL
Qty: 10 PACKET | Refills: 0 | Status: SHIPPED | OUTPATIENT
Start: 2021-04-06

## 2021-04-06 RX ORDER — LIDOCAINE HYDROCHLORIDE 20 MG/ML
INJECTION, SOLUTION EPIDURAL; INFILTRATION; INTRACAUDAL; PERINEURAL AS NEEDED
Status: DISCONTINUED | OUTPATIENT
Start: 2021-04-06 | End: 2021-04-06 | Stop reason: HOSPADM

## 2021-04-06 RX ORDER — ATROPINE SULFATE 0.1 MG/ML
0.5 INJECTION INTRAVENOUS
Status: DISCONTINUED | OUTPATIENT
Start: 2021-04-06 | End: 2021-04-06 | Stop reason: HOSPADM

## 2021-04-06 RX ADMIN — POLYETHYLENE GLYCOL 3350 17 G: 17 POWDER, FOR SOLUTION ORAL at 17:38

## 2021-04-06 RX ADMIN — PANTOPRAZOLE SODIUM 40 MG: 40 TABLET, DELAYED RELEASE ORAL at 17:38

## 2021-04-06 RX ADMIN — PROPOFOL 50 MG: 10 INJECTION, EMULSION INTRAVENOUS at 14:13

## 2021-04-06 RX ADMIN — SODIUM CHLORIDE: 900 INJECTION, SOLUTION INTRAVENOUS at 13:51

## 2021-04-06 RX ADMIN — LIDOCAINE HYDROCHLORIDE 60 MG: 20 INJECTION, SOLUTION EPIDURAL; INFILTRATION; INTRACAUDAL; PERINEURAL at 14:13

## 2021-04-06 RX ADMIN — Medication 10 ML: at 05:27

## 2021-04-06 NOTE — ANESTHESIA PREPROCEDURE EVALUATION
Relevant Problems   No relevant active problems       Anesthetic History     PONV          Review of Systems / Medical History  Patient summary reviewed, nursing notes reviewed and pertinent labs reviewed    Pulmonary                   Neuro/Psych             Comments: [facility]                       GI/Hepatic/Renal                Endo/Other        Arthritis     Other Findings              Physical Exam    Airway  Mallampati: II  TM Distance: 4 - 6 cm  Neck ROM: normal range of motion   Mouth opening: Normal     Cardiovascular    Rhythm: regular  Rate: normal         Dental  No notable dental hx       Pulmonary  Breath sounds clear to auscultation               Abdominal         Other Findings            Anesthetic Plan    ASA: 3  Anesthesia type: MAC          Induction: Intravenous  Anesthetic plan and risks discussed with: Patient

## 2021-04-06 NOTE — PROGRESS NOTES
CRE balloon dilatation of the esophagus   15 mm Balloon inflated to 3 ATMs and held for 10 econds. 16.5 mm Balloon inflated to 4.5 ATMs and held for 10 seconds. 18 mm Balloon inflated to 7 ATMs and held for 60 seconds. No subcutaneous crepitus of the chest or cervical region was noted post dilatation.

## 2021-04-06 NOTE — PROGRESS NOTES
TRANSFER - IN REPORT:    Verbal report received from [facility] RN(name) on Rashard Fierro  being received from [facility](unit) for ordered procedure      Report consisted of patients Situation, Background, Assessment and   Recommendations(SBAR). Information from the following report(s) SBAR was reviewed with the receiving nurse. Opportunity for questions and clarification was provided. Assessment completed upon patients arrival to unit and care assumed.

## 2021-04-06 NOTE — PROGRESS NOTES
TRANSFER - OUT REPORT:    Verbal report given to [address] [place] [address] RN (name) on Ester Palm  being transferred to [facility](unit) for routine post - op       Report consisted of patients Situation, Background, Assessment and   Recommendations(SBAR). Information from the following report(s) Procedure Summary was reviewed with the receiving nurse. Lines:   Peripheral IV 03/31/21 Anterior;Proximal;Right Antecubital (Active)   Site Assessment Clean, dry, & intact 04/06/21 1018   Phlebitis Assessment 0 04/06/21 1018   Infiltration Assessment 0 04/06/21 1018   Dressing Status Clean, dry, & intact 04/06/21 1018   Dressing Type Transparent;Tape 04/06/21 1018   Hub Color/Line Status Pink;Capped 04/06/21 1018   Action Taken Open ports on tubing capped 04/06/21 1018   Alcohol Cap Used Yes 04/06/21 1018        Opportunity for questions and clarification was provided.       Patient transported with:   [employer]

## 2021-04-06 NOTE — PROGRESS NOTES
[facility] Adult  Hospitalist Group                                                                                          Hospitalist Progress Note  Sarah Patel NP  Answering service: [phone] OR [phone] from in house phone        Date of Service:  2021  NAME:  Vale Balderas  :  1945  MRN:  [id number]      Admission Summary:   Patient was brought to the ED with nausea, vomiting and lower abdominal pain. She was recently hospitalized here from 3/193/22 when she was treated for sepsis secondary to pneumonia and CHATO    Interval history / Subjective:    Seen and examined patient sitting up in bed. Hard of hearing. States that she is feeling fine today. Discussed EGD. States that she is ok with having it done if her daughter agrees to it. Explained I talked to her daughter yesterday and she is in agreement. No new complaints.  No overnight events      Assessment & Plan:     Severe constipation/Obstipation  -CAT scan of the abdomen pelvis showed rectal fecal impaction, diffuse constipation with associated stercoral proctitis and a small volume of ascites, mild right hydronephrosis secondary to rectal fecal impaction.  -Continue miralax 17 gm twice daily, pericolace and other stool regimen  -Enema.  -She is now moving her bowels.  KUB showed decreased volume of stool within the rectal sigmoid with persistent large volume of stool within the right and transverse colon  - F/u with GI  - Continue GI lite diet     Mild obstructive right hydronephrosis due to constipation.  -creatinine normal   -continue pericolace  -monitor urine output     Coffee-ground emesis  -on iv protonix  -Hgb 11.6  -H/o LA grade C esophagitis on EGD 2020   -monitor H/H     Mild hypokalemia:  - Resolved  - Monitor labs     Hx of dysphagia, esophageal stenosis status post dilatation in 2020,   -per daughter, she has only been eating liquid/soft foods at home.  -Esophogram completed this AM- Discussed results with GI, appreciate recommendations.   - EGD scheduled for today.   - NPO      Recent sepsis secondary to pneumonia.    -Chest x-ray on admission showed improvement of the airspace disease        Code status: Full   DVT prophylaxis: SCDs    Care Plan discussed with: Patient/Family and Nurse  Anticipated Disposition: Home w/Family  Anticipated Discharge: Less than 24 hours     [id number]: Spoke with patient's daughter Fede Low ([phone]) Updated her on patient condition and plan of care. Questions answered. Hospital Problems  Date Reviewed: 3/19/2021          Codes Class Noted POA    Intractable vomiting ICD-10-CM: R11.10  ICD-9-CM: 536.2  3/29/2021 Unknown        Fecal impaction [facility]) ICD-10-CM: K56.41  ICD-9-CM: 560.32  3/29/2021 Unknown                Review of Systems:   A comprehensive review of systems was negative except for that written in the HPI. Vital Signs:    Last 24hrs VS reviewed since prior progress note. Most recent are:  Visit Vitals  BP (!) 147/73   Pulse 71   Temp 97.4 °F (36.3 °C)   Resp 18   Ht 5' 4\" (1.626 m)   Wt 59 kg (130 lb)   SpO2 96%   BMI 22.31 kg/m²       No intake or output data in the 24 hours ending 04/06/21 0807     Physical Examination:             Constitutional:  No acute distress, cooperative, pleasant, answers questions. Hard of hearing. ENT:  Oral mucosa moist, oropharynx benign. Resp:  CTA bilaterally on room air. No wheezing/rhonchi/rales. No accessory muscle use   CV:  Regular rhythm, normal rate, no murmurs, gallops, rubs    GI:  Soft, non distended, non tender. normoactive bowel sounds    Musculoskeletal:  No edema, warm, 2+ pulses throughout    Neurologic:  Moves all extremities. AAOx3, CN II-XII reviewed, follows commands.       Psych:  Not anxious or agitated       Data Review:    Review and/or order of clinical lab test  Review and/or order of tests in the medicine section of [facility]      Labs:     Recent Labs     04/06/21  0443 04/05/21  0142 WBC 3.4* 4.5   HGB 10.2* 11.3*   HCT 30.7* 34.4*   * 150     Recent Labs     04/06/21  0443 04/05/21  0142 04/04/21  0500    140 140   K 3.6 4.2 3.4*   * 110* 112*   CO2 25 26 24   BUN 17 14 10   CREA 0.78 0.83 0.69   * 90 81   CA 8.5 8.7 7.8*     Recent Labs     04/05/21  0142   ALT 18   *   TBILI 0.4   TP 5.8*   ALB 2.2*   GLOB 3.6     No results for input(s): INR, PTP, APTT, INREXT in the last 72 hours. No results for input(s): FE, TIBC, PSAT, FERR in the last 72 hours. No results found for: FOL, RBCF   No results for input(s): PH, PCO2, PO2 in the last 72 hours. No results for input(s): CPK, CKNDX, TROIQ in the last 72 hours.     No lab exists for component: CPKMB  Lab Results   Component Value Date/Time    Cholesterol, total 134 09/30/2014 05:00 AM    HDL Cholesterol 74 09/30/2014 05:00 AM    LDL, calculated 54.2 09/30/2014 05:00 AM    Triglyceride 29 09/30/2014 05:00 AM    CHOL/HDL Ratio 1.8 09/30/2014 05:00 AM     Lab Results   Component Value Date/Time    Glucose (POC) 102 (H) 04/06/2021 06:45 AM    Glucose (POC) 84 04/05/2021 06:23 AM    Glucose (POC) 99 04/05/2021 12:23 AM    Glucose (POC) 117 (H) 04/04/2021 04:35 PM    Glucose (POC) 70 04/04/2021 11:33 AM     Lab Results   Component Value Date/Time    Color YELLOW/STRAW 03/19/2021 02:12 AM    Appearance CLEAR 03/19/2021 02:12 AM    Specific gravity <1.005 03/19/2021 02:12 AM    Specific gravity 1.021 12/16/2020 01:48 PM    pH (UA) 6.0 03/19/2021 02:12 AM    Protein Negative 03/19/2021 02:12 AM    Glucose Negative 03/19/2021 02:12 AM    Ketone Negative 03/19/2021 02:12 AM    Bilirubin Negative 03/19/2021 02:12 AM    Urobilinogen 1.0 03/19/2021 02:12 AM    Nitrites Negative 03/19/2021 02:12 AM    Leukocyte Esterase SMALL (A) 03/19/2021 02:12 AM    Epithelial cells FEW 03/19/2021 02:12 AM    Bacteria 1+ (A) 03/19/2021 02:12 AM    WBC 20-50 03/19/2021 02:12 AM    RBC 10-20 03/19/2021 02:12 AM         Medications Reviewed: Current Facility-Administered Medications   Medication Dose Route Frequency    pantoprazole (PROTONIX) tablet 40 mg  40 mg Oral BID    glucose chewable tablet 16 g  4 Tab Oral PRN    dextrose (D50W) injection syrg 12.5-25 g  12.5-25 g IntraVENous PRN    glucagon (GLUCAGEN) injection 1 mg  1 mg IntraMUSCular PRN    polyethylene glycol (MIRALAX) packet 17 g  17 g Oral BID    senna-docusate (PERICOLACE) 8.6-50 mg per tablet 2 Tab  2 Tab Oral DAILY    sodium chloride (NS) flush 5-40 mL  5-40 mL IntraVENous Q8H    sodium chloride (NS) flush 5-40 mL  5-40 mL IntraVENous PRN    acetaminophen (TYLENOL) tablet 650 mg  650 mg Oral Q6H PRN    Or    acetaminophen (TYLENOL) suppository 650 mg  650 mg Rectal Q6H PRN    promethazine (PHENERGAN) tablet 12.5 mg  12.5 mg Oral Q6H PRN    Or    ondansetron (ZOFRAN) injection 4 mg  4 mg IntraVENous Q6H PRN    bisacodyL (DULCOLAX) suppository 10 mg  10 mg Rectal DAILY PRN     ______________________________________________________________________  EXPECTED LENGTH OF STAY: 3d 4h  ACTUAL LENGTH OF STAY:          6                 Hunter Champagne NP

## 2021-04-06 NOTE — PROGRESS NOTES
2000 Report received from [facility]. Patient and alert x4, patient is hard of hearing with left hearing aid on. No needs expressed. 0030 Bedside shift change report given to [facility] Bowden by Corinne Heaton RN. Report included the following information SBAR, Kardex, MAR, Accordion, and Recent Results.

## 2021-04-06 NOTE — PROGRESS NOTES
Transition of Care    RUR: 13%  Disposition: home with daughter  Follow up: pcp  Transport: daughter    CM continuing to follow for discharge planning needs. A consult was written for home health PT/OT as well as a rollator walker. CM spoke with patient about this and she asked that I call her daughter, Nataly Herron, to discuss further. CM called Nataly Herron ([phone]) to discuss. Nataly Herron stated she did not want patient to have home health as she cares for her 24/7 and had a list of reasons she did not want home health at this time. Nataly Herron was, however, agreeable to this CM ordering a rollator walker. Referral sent to [facility] via [employer], waiting for response. 1:30pm: [facility] has approved patient to have a rollator walker.  Follow up information placed on AVS.     KRYSTLE MAK, ACM-SW

## 2021-04-06 NOTE — PROGRESS NOTES
Patient tolerating diet well with no complaints of difficulty swallowing. Patient has no pain. Patient had BM x1. No nausea. Patient is 1-assist with a walker to the restroom. Bedside shift change report given to [address] (oncoming nurse) by Ray Schaefer RN (offgoing nurse). Report included the following information SBAR, Kardex, Intake/Output, MAR and Recent Results.

## 2021-04-06 NOTE — PROGRESS NOTES
[address]  [address]  E Kristie Bauer, [address]  [phone]                     GI PROGRESS NOTE    Patient Name: Danis Reeves      : 1945      MRN: [id number]  Admit Date: 3/29/2021  Today's Date: 2021    Subjective:     She complains of difficulty swallowing. Objective:     Blood pressure (!) 130/58, pulse 67, temperature 98 °F (36.7 °C), resp. rate 17, height 5' 4\" (1.626 m), weight 59 kg (130 lb), SpO2 97 %. Physical Exam:  General appearance: cooperative, no distress  HEENT: Sclerae anicteric. Extra-occular muscles are intact. GI: Abdomen nondistended, soft, non-tender  Neurological:  alert and oriented. Psychiatric: No anxiety or agitation.       Data Review:    Recent Results (from the past 24 hour(s))   GLUCOSE, POC    Collection Time: 21 12:23 AM   Result Value Ref Range    Glucose (POC) 99 65 - 100 mg/dL    Performed by Santos Jones    CBC W/O DIFF    Collection Time: 21  1:42 AM   Result Value Ref Range    WBC 4.5 3.6 - 11.0 K/uL    RBC 3.63 (L) 3.80 - 5.20 M/uL    HGB 11.3 (L) 11.5 - 16.0 g/dL    HCT 34.4 (L) 35.0 - 47.0 %    MCV 94.8 80.0 - 99.0 FL    MCH 31.1 26.0 - 34.0 PG    MCHC 32.8 30.0 - 36.5 g/dL    RDW 14.2 11.5 - 14.5 %    PLATELET 934 750 - 247 K/uL    MPV 10.7 8.9 - 12.9 FL    NRBC 0.0 0  WBC    ABSOLUTE NRBC 0.00 0.00 - 6.93 K/uL   METABOLIC PANEL, COMPREHENSIVE    Collection Time: 21  1:42 AM   Result Value Ref Range    Sodium 140 136 - 145 mmol/L    Potassium 4.2 3.5 - 5.1 mmol/L    Chloride 110 (H) 97 - 108 mmol/L    CO2 26 21 - 32 mmol/L    Anion gap 4 (L) 5 - 15 mmol/L    Glucose 90 65 - 100 mg/dL    BUN 14 6 - 20 MG/DL    Creatinine 0.83 0.55 - 1.02 MG/DL    BUN/Creatinine ratio 17 12 - 20      GFR est AA >60 >60 ml/min/1.73m2    GFR est non-AA >60 >60 ml/min/1.73m2    Calcium 8.7 8.5 - 10.1 MG/DL    Bilirubin, total 0.4 0.2 - 1.0 MG/DL    ALT (SGPT) 18 12 - 78 U/L    AST (SGOT) 23 15 - 37 U/L Alk. phosphatase 198 (H) 45 - 117 U/L    Protein, total 5.8 (L) 6.4 - 8.2 g/dL    Albumin 2.2 (L) 3.5 - 5.0 g/dL    Globulin 3.6 2.0 - 4.0 g/dL    A-G Ratio 0.6 (L) 1.1 - 2.2     GLUCOSE, POC    Collection Time: 04/05/21  6:23 AM   Result Value Ref Range    Glucose (POC) 84 65 - 100 mg/dL    Performed by Erika Espinoza / Plan :          N/V prior to admission:   - suspect related to significant constipation  - H/o LA grade C esophagitis on EGD 12/2020 - on BID PPI. Today Hgb 13.0 (3/22 10.5). - H/o gastroparesis on GES 02/15/21 but less likely cause of sx;   - Continue ondansetron prn      esophageal stricture:   - s/p dilation 12/2020. Currently complaining of dysphagia and Barium swallow shows tab getting stuck in distal esophageal stricture, EGD with dilation tomorrow       Patient Active Hospital Problem List:   Active Problems:    Intractable vomiting (3/29/2021)      Fecal impaction ([facility] [address] (3/29/2021)    I have personally reviewed the history and independently examined the patient. I have reviewed the chart and agree with the documentation recorded by the Mid Level Provider, including the assessment, treatment plan, and disposition.     Odalys De Luna MD

## 2021-04-06 NOTE — PROGRESS NOTES
Comprehensive Nutrition Assessment    Type and Reason for Visit: Initial, RD nutrition re-screen/LOS    Nutrition Recommendations/Plan:    1. Diet advancement to puree (or per GI after EGD)   - add Ensure Enlive BID once diet advanced & Boost Pudding BID     2. Please obtain measured weight - current wt patient state     Nutrition Assessment:    Pt admitted for intractable vomiting. PMHx: deafness, HLD, esophagitis, gastroparesis. PN with N/V and CHATO on admit. CT showing severe constipation/obstipation. Some improvement with bowel regimen. Hx of dysphagia with esophageal stricture s/p dilation in 12/2020, plans for EGD with repleted dilation PRN. Hx of gastroparesis however not thought to be the primary issue per GI notes. Per NP notes pt eating mostly liquid/soft foods at home PTA due to swallowing issue. Pt deaf and lip-reads so hard to get info from pt. Spoke with daughter via phone. Pt drinking Ensure 2-3 times per day with very soft/liquid potatoes, pudding, etc. Has not been eating any cold foods or using ice since told it can worsen stricture. Recommend puree diet (based on intake at home PTA - upgrade pending tolerance) + Ensure Enlive BID (vanilla 700kcal, 40g protein) + Boost Pudding BID (480kcal, 14g protein). Of note: pt with previous PEG tube in 2017 to improve nutrition status prior to hiatal hernia repair. Daughter also with issue of esophageal stricture. Wt stable -however current wt is patient stated. Please obtain measured weight. Malnutrition Assessment:  Malnutrition Status:  No malnutrition      Nutritionally Significant Medications: protonix, miralax, pericolace; PRN: phenergan, dulcolax suppository, zofran    Estimated Daily Nutrient Needs:  Energy (kcal): 1405-1535kcal(MSJ x 1.2 x 1.1-1.2); Weight Used for Energy Requirements: Current(59kg)  Protein (g): 59-65g (1-1.1g/kg);  Weight Used for Protein Requirements: Current(59kg)  Fluid (ml/day): 1500; Method Used for Fluid Requirements: 1 ml/kcal    Nutrition Related Findings:       BM: 4/5  Edema: none  Wounds:  None       Current Nutrition Therapies:   Diet: NPO - for EGD (mech soft previously)  Supplements: Ensure Clear TID  Meal intake:   Patient Vitals for the past 168 hrs:   % Diet Eaten   04/06/21 1018 0 %   04/04/21 1500 75 %   04/04/21 0833 100 %   04/02/21 1421 100 %   04/02/21 1056 95 %     Anthropometric Measures:  · Height:  5' 4\" (162.6 cm)  · Current Body Wt:  59 kg (130 lb)   · Admission Body Wt:       · Usual Body Wt:        · Ideal Body Wt:  120:  108.3 %   Wt Readings from Last 10 Encounters:   03/29/21 59 kg (130 lb)   03/21/21 58.6 kg (129 lb 3 oz)   11/20/20 59.1 kg (130 lb 3.2 oz)   01/28/20 54.4 kg (120 lb)   01/25/16 54.7 kg (120 lb 9.5 oz)   10/01/14 61.7 kg (136 lb)     Nutrition Diagnosis:   · Swallowing difficulty related to altered GI structure as evidenced by swallowing study results    Nutrition Interventions:   Food and/or Nutrient Delivery: Start oral nutrition supplement  Nutrition Education and Counseling: No recommendations at this time  Coordination of Nutrition Care: Continue to monitor while inpatient    Goals:  Resumption of diet with supplements in 2-3 days       Nutrition Monitoring and Evaluation:   Behavioral-Environmental Outcomes: None identified  Food/Nutrient Intake Outcomes: Supplement intake, Food and nutrient intake  Physical Signs/Symptoms Outcomes: Weight, Chewing or swallowing    Discharge Planning:     Too soon to determine     Roney De Jesus, RD  [address] , Pager #[phone] or via imoji

## 2021-04-06 NOTE — PROGRESS NOTES
Bedside and Verbal shift change report given to HU Simental (oncoming nurse) by Shimon Warner RN (offgoing nurse). Report included the following information SBAR, Kardex, ED Summary, Intake/Output, MAR and Recent Results.

## 2021-04-06 NOTE — PROGRESS NOTES
Problem: Mobility Impaired (Adult and Pediatric)  Goal: *Acute Goals and Plan of Care (Insert Text)  Description: FUNCTIONAL STATUS PRIOR TO ADMISSION: Patient was modified independent using a single point cane when out in the community or furniture surfer at home for functional mobility. HOME SUPPORT PRIOR TO ADMISSION: The patient lived with her daughter and son-in-law with no assistance needed for mobility. Physical Therapy Goals  Initiated 3/30/2021  1. Patient will move from supine to sit and sit to supine  in bed with independence within 7 day(s). 2.  Patient will transfer from bed to chair and chair to bed with modified independence using the least restrictive device within 7 day(s). 3.  Patient will perform sit to stand with modified independence within 7 day(s). 4.  Patient will ambulate with modified independence for 150 feet with the least restrictive device within 7 day(s). Revised 4/6/2021  1. Patient will move from supine to sit and sit to supine  in bed with independence within 7 day(s). 2.  Patient will transfer from bed to chair and chair to bed with modified independence using the least restrictive device within 7 day(s). 3.  Patient will perform sit to stand with modified independence within 7 day(s). 4.  Patient will ambulate with modified independence for 300 feet with the rollator within 7 day(s)  Outcome: Progressing Towards Goal     PHYSICAL THERAPY TREATMENT: WEEKLY REASSESSMENT  Patient: Donte Herron (20 y.o. female)  Date: 4/6/2021  Primary Diagnosis: Intractable vomiting [R11.10]  Fecal impaction (HCC) [K56.41]  Procedure(s) (LRB):  ESOPHAGOGASTRODUODENOSCOPY (EGD) (N/A)  ESOPHAGOGASTRODUODENAL (EGD) BIOPSY (N/A) Day of Surgery   Precautions:   Fall(very [place])      ASSESSMENT  Patient continues with skilled PT services and is progressing towards goals. Trialed with a walker today, first RW then rollator.  Patient is much more steady and can increased ambulation distance with the device. Some SOB observed with distance ambulation today, and the rollator allowed the Patient to take a seated rest break. O2 stable throughout. Recommend home with HHPT and rollator upon discharge. Order placed and CM aware. Plan for discharge tomorrow. Patient's progression toward goals since last assessment: Ambulating in the hallway daily and sitting up the chair x 3 daily. Current Level of Function Impacting Discharge (mobility/balance): DME training    Functional Outcome Measure: The patient scored 23/28 on the Tinetti balance outcome measure which is indicative of low fall risk. Other factors to consider for discharge: Lives with family members         PLAN :  Goals have been updated based on progression since last assessment. Patient continues to benefit from skilled intervention to address the above impairments. Recommendations and Planned Interventions: transfer training, gait training, therapeutic exercises, neuromuscular re-education, patient and family training/education, and therapeutic activities      Frequency/Duration: Patient will be followed by physical therapy:  3 times a week to address goals. Recommendation for discharge: (in order for the patient to meet his/her long term goals)  Physical therapy at least 2 days/week in the home     This discharge recommendation:  Has been made in collaboration with the attending provider and/or case management    IF patient discharges home will need the following DME: rollator         SUBJECTIVE:   Patient stated Melida Christensen my daughter and ask that it's okay for me to get a rollator.     OBJECTIVE DATA SUMMARY:   HISTORY:    Past Medical History:   Diagnosis Date    Arthritis     Deaf, nonspeaking     High cholesterol     Inner ear dysfunction      Past Surgical History:   Procedure Laterality Date    HX HYSTERECTOMY         Personal factors and/or comorbidities impacting plan of care: arthritis, hx of PNA    Home Situation  Home Environment: Private residence  Wheelchair Ramp: Yes  One/Two Story Residence: One story  Living Alone: No  Support Systems: Family member(s)(lives with daughter and son-in-law)  Patient Expects to be Discharged to[de-identified] Private residence  Current DME Used/Available at Home: [employer], straight  Tub or Shower Type: Tub/Shower combination    EXAMINATION/PRESENTATION/DECISION MAKING:   Critical Behavior:  Neurologic State: Alert  Orientation Level: Oriented X4  Cognition: Follows commands  Safety/Judgement: Awareness of environment, Fall prevention, Insight into deficits    Strength:    Strength: Generally decreased, functional    Tone & Sensation:   Tone: Normal  Sensation: Intact    Coordination:  Coordination: Within functional limits    Functional Mobility:  Bed Mobility:  Supine to Sit: Supervision  Sit to Supine: Supervision     Transfers:  Sit to Stand: Stand-by assistance  Stand to Sit: Stand-by assistance  Bed to Chair: Stand-by assistance    Balance:   Sitting: Intact  Standing: Intact; With support    Ambulation/Gait Training:  Distance (ft): 200 Feet (ft)  Assistive Device: Gait belt;Walker, rolling  Ambulation - Level of Assistance: Supervision; Adaptive equipment  Gait Abnormalities: Decreased step clearance  Speed/Yadira: Slow    Functional Measure:  Tinetti test:    Sitting Balance: 1  Arises: 1  Attempts to Rise: 2  Immediate Standing Balance: 2  Standing Balance: 2  Nudged: 1  Eyes Closed: 1  Turn 360 Degrees - Continuous/Discontinuous: 1  Turn 360 Degrees - Steady/Unsteady: 1  Sitting Down: 2  Balance Score: 14 Balance total score  Indication of Gait: 1  R Step Length/Height: 1  L Step Length/Height: 1  R Foot Clearance: 1  L Foot Clearance: 1  Step Symmetry: 1  Step Continuity: 1  Path: 1  Trunk: 0  Walking Time: 1  Gait Score: 9 Gait total score  Total Score: 23/28 Overall total score         Tinetti Tool Score Risk of Falls  <19 = High Fall Risk  19-24 = Moderate Fall Risk  25-28 = Low Fall Risk  [address]. Performance-Oriented Assessment of Mobility Problems in Elderly Patients. [facility] 66; [id number]. (Scoring Description: PT Bulletin Feb. 10, 1993)    Older adults: Kiko Rosado et al, 2009; n = 1000 [facility] elderly evaluated with ABC, JOHN, ADL, and IADL)  · Mean JOHN score for males aged 69-68 years = 26.21(3.40)  · Mean JOHN score for females age 69-68 years = 25.16(4.30)  · Mean JOHN score for males over 80 years = 23.29(6.02)  · Mean JOHN score for females over 80 years = 17.20(8.32)          Pain Rating:  No pain reported during treatment session    Activity Tolerance:   Fair, SpO2 stable on RA, and observed SOB with activity    After treatment patient left in no apparent distress:   Sitting in chair and Call bell within reach    COMMUNICATION/EDUCATION:   The patients plan of care was discussed with: Registered nurse and Called daughter, and CM . Fall prevention education was provided and the patient/caregiver indicated understanding., Patient/family have participated as able in goal setting and plan of care. , and Patient/family agree to work toward stated goals and plan of care.     Thank you for this referral.  Lauri Peabody, PT, DPT  Geriatric Clinical Specialist     Time Calculation: 27 mins

## 2021-04-06 NOTE — PROGRESS NOTES
Report given to endo at this time. Patient down for egd for dilation. 2:33 PM  EDG complete. Bx taken and dilation completed. The patients stomach was completely full of food.

## 2021-04-06 NOTE — PROCEDURES
[address]  [address]                 NAME:  Al Martines   :   1945   MRN:   [id number]     Date/Time:  2021 2:28 PM    Esophagogastroduodenoscopy (EGD) Procedure Note    :  Dhara Mccormick MD    Staff: Endoscopy Technician-1: Jesús Cotton  Endoscopy RN-1: Kvng Carnes RN     Referring Provider:  Patricia Nicole PA-C    Anethesia/Sedation:  MAC anesthesia Propofol    Preoperative diagnosis: ndysphagia    Postoperative diagnosis: Esophageal stricture    Procedure Details     After infom consent was obtained for the procedure, with all risks and benefits of procedure explained the patient was taken to the endoscopy suite and placed in the left lateral decubitus position. Following sequential administration of sedation as per above, the [id number] gastroscope was inserted into the mouth and advanced under direct vision to second portion of the duodenum. A careful inspection was made as the gastroscope was withdrawn, including a retroflexed view of the proximal stomach; findings and interventions are described below. Findings:  Esophagus:Stricture in distal esophagus was dilated with 15-18 mm Balloon for 1 minute. Random biopsies done. Stomach:Retained food seen in stomach  Duodenum/jejunum:normal      Therapies:  As above    Specimens: Random esophagus bx           EBL: None    Complications:   None; patient tolerated the procedure well. Impression:    See Postoperative diagnosis above    Recommendations:  -Await pathology. , -Mechanical soft diet, continue current meds.         Dhara Mccormick MD

## 2021-04-06 NOTE — PROGRESS NOTES
[address]  [address]  E Kristie Bauer, [address]  [phone]                     GI PROGRESS NOTE    Patient Name: Dyllan Hoff      : 1945      MRN: [id number]  Admit Date: 3/29/2021  Today's Date: 2021    Subjective:     She has a h/o esophageal stricture, and while we had initially seen her this admission for fecal impaction, we were asked to come back yesterday to readdress her dysphagia. She has been having difficulty intermittently, and in the past she has required repeated esophageal dilatation. BASW yesterday revealed a distended esophagus with a stricture in the lower portion, whereby the barium tablet became stuck. Patient is agreeable to EGD today. Objective:     Blood pressure 118/61, pulse 69, temperature 98 °F (36.7 °C), resp. rate 16, height 5' 4\" (1.626 m), weight 59 kg (130 lb), SpO2 99 %. Physical Exam:  General appearance: cooperative, no distress  HEENT: Sclerae anicteric. Extra-occular muscles are intact. GI: Abdomen nondistended, soft  Lungs:  CTA  Heart:  RRR  Neurological:  alert and oriented. Psychiatric: No anxiety or agitation. Data Review:    Recent Results (from the past 24 hour(s))   CBC WITH AUTOMATED DIFF    Collection Time: 21  4:43 AM   Result Value Ref Range    WBC 3.4 (L) 3.6 - 11.0 K/uL    RBC 3.21 (L) 3.80 - 5.20 M/uL    HGB 10.2 (L) 11.5 - 16.0 g/dL    HCT 30.7 (L) 35.0 - 47.0 %    MCV 95.6 80.0 - 99.0 FL    MCH 31.8 26.0 - 34.0 PG    MCHC 33.2 30.0 - 36.5 g/dL    RDW 14.1 11.5 - 14.5 %    PLATELET 787 (L) 702 - 400 K/uL    MPV 10.5 8.9 - 12.9 FL    NRBC 0.0 0  WBC    ABSOLUTE NRBC 0.00 0.00 - 0.01 K/uL    NEUTROPHILS 43 32 - 75 %    LYMPHOCYTES 38 12 - 49 %    MONOCYTES 14 (H) 5 - 13 %    EOSINOPHILS 3 0 - 7 %    BASOPHILS 2 (H) 0 - 1 %    IMMATURE GRANULOCYTES 0 0.0 - 0.5 %    ABS. NEUTROPHILS 1.5 (L) 1.8 - 8.0 K/UL    ABS. LYMPHOCYTES 1.3 0.8 - 3.5 K/UL    ABS. MONOCYTES 0.5 0.0 - 1.0 K/UL    ABS. EOSINOPHILS 0.1 0.0 - 0.4 K/UL    ABS. BASOPHILS 0.1 0.0 - 0.1 K/UL    ABS. IMM. GRANS. 0.0 0.00 - 0.04 K/UL    DF AUTOMATED     METABOLIC PANEL, BASIC    Collection Time: 04/06/21  4:43 AM   Result Value Ref Range    Sodium 142 136 - 145 mmol/L    Potassium 3.6 3.5 - 5.1 mmol/L    Chloride 111 (H) 97 - 108 mmol/L    CO2 25 21 - 32 mmol/L    Anion gap 6 5 - 15 mmol/L    Glucose 105 (H) 65 - 100 mg/dL    BUN 17 6 - 20 MG/DL    Creatinine 0.78 0.55 - 1.02 MG/DL    BUN/Creatinine ratio 22 (H) 12 - 20      GFR est AA >60 >60 ml/min/1.73m2    GFR est non-AA >60 >60 ml/min/1.73m2    Calcium 8.5 8.5 - 10.1 MG/DL   GLUCOSE, POC    Collection Time: 04/06/21  6:45 AM   Result Value Ref Range    Glucose (POC) 102 (H) 65 - 100 mg/dL    Performed by Chato Moreira / Plan :     Dysphagia with known esophageal stricture with repeated dilatations in the past  - BASW 4/5 -  The esophagus is moderately distended. Severe esophageal dysmotility is noted. There is some reflux of contrast material and the patient was in the supine  position. There is suggestion of narrowing of the distal esophagus just above  the GE junction. A 12 mm barium tablet was given which was delayed in this  region consistent with a stricture. - Plan for EGD with dilatation today with Dr. Lida Rdz     N/V prior to admission:   - suspect related to significant constipation  - H/o LA grade C esophagitis on EGD 12/2020 - on BID PPI  - H/o gastroparesis on GES 02/15/21 but less likely cause of sx  - Continue ondansetron prn          Patient Active Hospital Problem List:   Active Problems:    Intractable vomiting (3/29/2021)      Fecal impaction ([address] (3/29/2021)           I have examined the patient. I have reviewed the chart and agree with the documentation recorded by the RAMSES, including the assessment, treatment plan, and disposition.       Queta Hyde MD

## 2021-04-06 NOTE — PROGRESS NOTES
Occupational Therapy    Patient chart reviewed in prep for OT weekly re-assessment. Patient currently DANNY to ENDO. Will defer and follow-up as able and appropriate. Thank you.   Danielle Khan, OTR/L

## 2021-04-06 NOTE — ANESTHESIA POSTPROCEDURE EVALUATION
Post-Anesthesia Evaluation and Assessment    Patient: Corbin Espinoza MRN: [id number]  SSN: [id number]    YOB: 1945  Age: 67 y.o. Sex: female      I have evaluated the patient and they are stable and ready for discharge from the PACU. Cardiovascular Function/Vital Signs  Visit Vitals  /62   Pulse 63   Temp 36.7 °C (98 °F)   Resp 21   Ht 5' 4\" (1.626 m)   Wt 59 kg (130 lb)   SpO2 95%   Breastfeeding No   BMI 22.31 kg/m²       Patient is status post MAC anesthesia for Procedure(s):  ESOPHAGOGASTRODUODENOSCOPY (EGD)  ESOPHAGEAL DILATION  ESOPHAGOGASTRODUODENAL (EGD) BIOPSY. Nausea/Vomiting: None    Postoperative hydration reviewed and adequate. Pain:  Pain Scale 1: Visual (04/06/21 1429)  Pain Intensity 1: 0 (04/06/21 1429)   Managed    Neurological Status:   Neuro  Neurologic State: Alert (04/06/21 0010)  Orientation Level: Oriented X4 (04/06/21 0010)  Cognition: Follows commands (04/06/21 0010)   At baseline    Mental Status, Level of Consciousness: Alert and  oriented to person, place, and time    Pulmonary Status:   O2 Device: Room air (04/06/21 1429)   Adequate oxygenation and airway patent    Complications related to anesthesia: None    Post-anesthesia assessment completed. No concerns    Signed By: Kelechi Cox MD     April 6, 2021              Procedure(s):  ESOPHAGOGASTRODUODENOSCOPY (EGD)  ESOPHAGEAL DILATION  ESOPHAGOGASTRODUODENAL (EGD) BIOPSY. MAC    <BSHSIANPOST>    INITIAL Post-op Vital signs:   Vitals Value Taken Time   /57 04/06/21 1430   Temp     Pulse 68 04/06/21 1432   Resp 19 04/06/21 1432   SpO2 97 % 04/06/21 1432   Vitals shown include unvalidated device data.
never used

## 2021-04-06 NOTE — ROUTINE PROCESS
Al Martines 
1945 
[id number] Situation: 
Verbal report received from: [place] 
Procedure: Procedure(s): ESOPHAGOGASTRODUODENOSCOPY (EGD) ESOPHAGEAL DILATION 
ESOPHAGOGASTRODUODENAL (EGD) BIOPSY Background: 
 
Preoperative diagnosis: ndysphagia Postoperative diagnosis: esophageal stricture :  Dr. Kathy Solis Assistant(s): Endoscopy Technician-1: Karis Haider IV 
Endoscopy RN-1: Kvng Carnes RN Specimens:  
ID Type Source Tests Collected by Time Destination 1 : esophagus Preservative   Vani Hendricks MD 4/6/2021 1418 Pathology H. Pylori  no Assessment: 
Intra-procedure medications Anesthesia gave intra-procedure sedation and medications, see anesthesia flow sheet yes Intravenous fluids: NS@ Jerlene Paci Vital signs stable Abdominal assessment: round and soft Recommendation: 
Return to floor.

## 2021-04-07 VITALS
BODY MASS INDEX: 22.2 KG/M2 | RESPIRATION RATE: 18 BRPM | TEMPERATURE: 98.4 F | HEIGHT: 64 IN | WEIGHT: 130 LBS | SYSTOLIC BLOOD PRESSURE: 122 MMHG | DIASTOLIC BLOOD PRESSURE: 60 MMHG | HEART RATE: 62 BPM | OXYGEN SATURATION: 96 %

## 2021-04-07 LAB
ANION GAP SERPL CALC-SCNC: 5 MMOL/L (ref 5–15)
BASOPHILS # BLD: 0.1 K/UL (ref 0–0.1)
BASOPHILS NFR BLD: 2 % (ref 0–1)
BUN SERPL-MCNC: 18 MG/DL (ref 6–20)
BUN/CREAT SERPL: 20 (ref 12–20)
CALCIUM SERPL-MCNC: 9 MG/DL (ref 8.5–10.1)
CHLORIDE SERPL-SCNC: 108 MMOL/L (ref 97–108)
CO2 SERPL-SCNC: 28 MMOL/L (ref 21–32)
CREAT SERPL-MCNC: 0.88 MG/DL (ref 0.55–1.02)
DIFFERENTIAL METHOD BLD: ABNORMAL
EOSINOPHIL # BLD: 0.1 K/UL (ref 0–0.4)
EOSINOPHIL NFR BLD: 2 % (ref 0–7)
ERYTHROCYTE [DISTWIDTH] IN BLOOD BY AUTOMATED COUNT: 14 % (ref 11.5–14.5)
GLUCOSE SERPL-MCNC: 110 MG/DL (ref 65–100)
HCT VFR BLD AUTO: 34.7 % (ref 35–47)
HGB BLD-MCNC: 11.3 G/DL (ref 11.5–16)
IMM GRANULOCYTES # BLD AUTO: 0 K/UL (ref 0–0.04)
IMM GRANULOCYTES NFR BLD AUTO: 0 % (ref 0–0.5)
LYMPHOCYTES # BLD: 1.6 K/UL (ref 0.8–3.5)
LYMPHOCYTES NFR BLD: 40 % (ref 12–49)
MCH RBC QN AUTO: 31.7 PG (ref 26–34)
MCHC RBC AUTO-ENTMCNC: 32.6 G/DL (ref 30–36.5)
MCV RBC AUTO: 97.2 FL (ref 80–99)
MONOCYTES # BLD: 0.5 K/UL (ref 0–1)
MONOCYTES NFR BLD: 12 % (ref 5–13)
NEUTS SEG # BLD: 1.8 K/UL (ref 1.8–8)
NEUTS SEG NFR BLD: 44 % (ref 32–75)
NRBC # BLD: 0 K/UL (ref 0–0.01)
NRBC BLD-RTO: 0 PER 100 WBC
PLATELET # BLD AUTO: 168 K/UL (ref 150–400)
PMV BLD AUTO: 10.4 FL (ref 8.9–12.9)
POTASSIUM SERPL-SCNC: 4.1 MMOL/L (ref 3.5–5.1)
RBC # BLD AUTO: 3.57 M/UL (ref 3.8–5.2)
SODIUM SERPL-SCNC: 141 MMOL/L (ref 136–145)
WBC # BLD AUTO: 4.1 K/UL (ref 3.6–11)

## 2021-04-07 PROCEDURE — 85025 COMPLETE CBC W/AUTO DIFF WBC: CPT

## 2021-04-07 PROCEDURE — 74011250637 HC RX REV CODE- 250/637: Performed by: HOSPITALIST

## 2021-04-07 PROCEDURE — 74011250637 HC RX REV CODE- 250/637: Performed by: PHYSICIAN ASSISTANT

## 2021-04-07 PROCEDURE — 36415 COLL VENOUS BLD VENIPUNCTURE: CPT

## 2021-04-07 PROCEDURE — 80048 BASIC METABOLIC PNL TOTAL CA: CPT

## 2021-04-07 PROCEDURE — 97530 THERAPEUTIC ACTIVITIES: CPT | Performed by: OCCUPATIONAL THERAPIST

## 2021-04-07 PROCEDURE — 97535 SELF CARE MNGMENT TRAINING: CPT | Performed by: OCCUPATIONAL THERAPIST

## 2021-04-07 RX ADMIN — DOCUSATE SODIUM 50 MG AND SENNOSIDES 8.6 MG 2 TABLET: 8.6; 5 TABLET, FILM COATED ORAL at 09:20

## 2021-04-07 RX ADMIN — POLYETHYLENE GLYCOL 3350 17 G: 17 POWDER, FOR SOLUTION ORAL at 09:20

## 2021-04-07 RX ADMIN — PANTOPRAZOLE SODIUM 40 MG: 40 TABLET, DELAYED RELEASE ORAL at 09:20

## 2021-04-07 RX ADMIN — Medication 10 ML: at 06:00

## 2021-04-07 NOTE — DISCHARGE INSTRUCTIONS
Discharge Instructions       PATIENT ID: Silvia Haider  MRN: [id number]   YOB: 1945    DATE OF ADMISSION: 3/29/2021  6:36 PM    DATE OF DISCHARGE: 4/7/2021    PRIMARY CARE PROVIDER: Yovani Rodriguez PA-C     ATTENDING PHYSICIAN: Johan Friedman MD  DISCHARGING PROVIDER: Debbie Payne NP    To contact this individual call [phone] and ask the  to page. If unavailable ask to be transferred the Adult Hospitalist Department. DISCHARGE DIAGNOSES Constipation, resolved; dysphagia     CONSULTATIONS: IP CONSULT TO HOSPITALIST  IP CONSULT TO GASTROENTEROLOGY    PROCEDURES/SURGERIES: Procedure(s):  ESOPHAGOGASTRODUODENOSCOPY (EGD)  ESOPHAGEAL DILATION  ESOPHAGOGASTRODUODENAL (EGD) BIOPSY    PENDING TEST RESULTS:   At the time of discharge the following test results are still pending: EGD biopsies     FOLLOW UP APPOINTMENTS:   Follow-up Information     Follow up With Specialties Details Why Contact [facility]  This is the company that provided you with a walker [address]    Yovani Rodriguez PA-C Physician Assistant Schedule an appointment as soon as possible for a visit For follow up  [address] [place] [address]      Dafne Vuong MD Gastroenterology Schedule an appointment as soon as possible for a visit For follow up  [address] [place] [address]  [phone]             ADDITIONAL CARE RECOMMENDATIONS:   Take medications as prescribed     DIET: Advance as tolerated, soft diet       ACTIVITY: Activity as tolerated    WOUND CARE: None     EQUIPMENT needed: None       DISCHARGE MEDICATIONS:   See Medication Reconciliation Form    · It is important that you take the medication exactly as they are prescribed.    · Keep your medication in the bottles provided by the pharmacist and keep a list of the medication names, dosages, and times to be taken in your wallet. · Do not take other medications without consulting your doctor. NOTIFY YOUR PHYSICIAN FOR ANY OF THE FOLLOWING:   Fever over 101 degrees for 24 hours. Chest pain, shortness of breath, fever, chills, nausea, vomiting, diarrhea, change in mentation, falling, weakness, bleeding. Severe pain or pain not relieved by medications. Or, any other signs or symptoms that you may have questions about.       DISPOSITION:  X  Home With:   OT  PT  HH  RN       SNF/Inpatient Rehab/LTAC    Independent/assisted living    Hospice    Other:     Signed:   Merle Swift NP  4/7/2021  0356

## 2021-04-07 NOTE — PROGRESS NOTES
Problem: Self Care Deficits Care Plan (Adult)  Goal: *Acute Goals and Plan of Care (Insert Text)  Description:   FUNCTIONAL STATUS PRIOR TO ADMISSION: Patient was modified independent using a single point cane for functional mobility, or furniture walking in the home. Patient was modified independent for basic and instrumental ADLs. HOME SUPPORT: The patient lived with dtr and JALEESA but did not require assist.    Occupational Therapy Goals  Initiated 3/30/2021  1. Patient will perform grooming, standing at sink, with modified independence within 7 day(s). 2.  Patient will perform lower body dressing with modified independence within 7 day(s). 3.  Patient will perform simple home management task with supervision/set-up within 7 day(s). 4.  Patient will perform toilet transfers and all aspects of toileting with modified independence within 7 day(s). 5.  Patient will participate in upper extremity therapeutic exercise/activities with modified independence for 5 minutes within 7 day(s). 6.  Patient will utilize energy conservation techniques during functional activities with verbal cues within 7 day(s). Outcome: Progressing Towards Goal    OCCUPATIONAL THERAPY TREATMENT  Patient: Patricio Elmore (26 y.o. female)  Date: 4/7/2021  Diagnosis: Intractable vomiting [R11.10]  Fecal impaction (HCC) [K56.41] <principal problem not specified>  Procedure(s) (LRB):  ESOPHAGOGASTRODUODENOSCOPY (EGD) (N/A)  ESOPHAGEAL DILATION (N/A)  ESOPHAGOGASTRODUODENAL (EGD) BIOPSY (N/A) 1 Day Post-Op  Precautions: Fall(very [place])  Chart, occupational therapy assessment, plan of care, and goals were reviewed. ASSESSMENT  Patient continues with skilled OT services and is progressing towards goals.   Patient seen this date as she is scheduled for discharge and not seen by OT since 4/1, demonstrated increased balance and overall supervision for basic ADL's and ADL mobility with rollator, slight shortness of breath after toilet transfer into bathroom and standing to perform bathing. On target for discharge. Current Level of Function Impacting Discharge (ADLs): supervision    Other factors to consider for discharge:          PLAN :  Patient continues to benefit from skilled intervention to address the above impairments. Continue treatment per established plan of care to address goals. Recommendation for discharge: (in order for the patient to meet his/her long term goals)  Occupational therapy at least 2 days/week in the home     This discharge recommendation:  Has been made in collaboration with the attending provider and/or case management    IF patient discharges home will need the following DME: none       SUBJECTIVE:   Patient stated I'm going home today.     OBJECTIVE DATA SUMMARY:   Cognitive/Behavioral Status:  Neurologic State: Alert;Eyes open spontaneously  Orientation Level: Appropriate for age;Oriented X4  Cognition: Follows commands             Functional Mobility and Transfers for ADLs:  Bed Mobility:       Transfers:  Sit to Stand: Supervision;Modified independent  Functional Transfers  Bathroom Mobility: Supervision/set up  Toilet Transfer : Supervision  Bed to Chair: Supervision    Balance:  Sitting: Intact    ADL Intervention:       Grooming  Grooming Assistance: Set-up  Position Performed: Seated in chair  Washing Face: Set-up  Brushing/Combing Hair: Modified independent    Upper Body Bathing  Bathing Assistance: Set-up  Position Performed: Seated in chair    Lower Body Bathing  Bathing Assistance: Set-up  Perineal  : Supervision  Position Performed: Standing  Lower Body : Modified independent  Position Performed:  Other (comment)(seated on toilet)    Upper Body Dressing Assistance  Dressing Assistance: Modified independent  Pullover Shirt: Modified independent    Lower Body Dressing Assistance  Dressing Assistance: Supervision  Protective Undergarmet: Set-up  Pants With Elastic Waist: Supervision  Socks: Modified independent  Leg Crossed Method Used: Yes  Position Performed: Seated in chair    Toileting  Toileting Assistance: Supervision  Bladder Hygiene: Supervision  Bowel Hygiene: Supervision  Clothing Management: Set-up  Adaptive Equipment: Walker         Pain:  No complaint    Activity Tolerance:   Good, Fair, and observed SOB with activity    After treatment patient left in no apparent distress:   Sitting in chair and Call bell within reach    COMMUNICATION/COLLABORATION:   The patients plan of care was discussed with: Registered nurse.      Michelle Oliver OTR/L  Time Calculation: 27 mins

## 2021-04-07 NOTE — DISCHARGE SUMMARY
Discharge Summary       PATIENT ID: Edith Ruiz  MRN: [id number]   YOB: 1945    DATE OF ADMISSION: 3/29/2021  6:36 PM    DATE OF DISCHARGE: 04/07/2021  PRIMARY CARE PROVIDER: Delmis Vidales PA-C     ATTENDING PHYSICIAN: Severo Hard, MD  DISCHARGING PROVIDER: Saulo Bedoya NP    To contact this individual call [phone] and ask the  to page. If unavailable ask to be transferred the Adult Hospitalist Department. CONSULTATIONS: IP CONSULT TO HOSPITALIST  IP CONSULT TO GASTROENTEROLOGY    PROCEDURES/SURGERIES: Procedure(s):  ESOPHAGOGASTRODUODENOSCOPY (EGD)  ESOPHAGEAL DILATION  ESOPHAGOGASTRODUODENAL (EGD) BIOPSY    ADMITTING DIAGNOSES & HOSPITAL COURSE:   Per H&P: Edith Ruiz is a 67 y.o. female with hx deafness, dyslipidemia, and arthritis who presents with   . She was just hospitalized for left-sided pneumonia, intractable vomiting, and CHATO. She presents with continued vomiting of possible coffee ground material.  Sx are associated with intermittent abdominal pain, and obstipation. Per daughter, she was giving patient increased doses of miralax with no BM. Patient had a prior episode of      In the ED, VSS. Labs were grossly unremarkable.  CT abdomen/pelvis showed rectal fecal impaction which was unchanged from 3/19/21, stercoral proctitis, small volume ascites, and mild right hydronephrosis         In the ED, she rec'd 1Lns            DISCHARGE DIAGNOSES / PLAN:      Severe constipation/Obstipation  -CAT scan of the abdomen pelvis showed rectal fecal impaction, diffuse constipation with associated stercoral proctitis and a small volume of ascites, mild right hydronephrosis secondary to rectal fecal impaction.  -Continue miralax 17 gm twice daily, pericolace and other stool regimen  -Enema.  -She is now moving her bowels.  KUB showed decreased volume of stool within the rectal sigmoid with persistent large volume of stool within the right and transverse colon  - F/u with GI  - Continue GI lite diet     Mild obstructive right hydronephrosis due to constipation.  -creatinine normal     Coffee-ground emesis, resolved  -on iv protonix  -Hgb 11.6  -H/o LA grade C esophagitis on EGD 12/2020   -monitor H/H     Mild hypokalemia:  - Resolved  - Monitor labs     Hx of dysphagia, esophageal stenosis status post dilatation in 12/2020,   -per daughter, she has only been eating liquid/soft foods at home.  -Esophogram completed this AM- Discussed results with GI, appreciate recommendations.   - EGD 04/06: Findings:  Esophagus:Stricture in distal esophagus was dilated with 15-18 mm Balloon for 1 minute. Random biopsies done.   Stomach:Retained food seen in stomach  Duodenum/jejunum:normal   - Follow up with GI     Recent sepsis secondary to pneumonia.    -Chest x-ray on admission showed improvement of the airspace disease        ADDITIONAL CARE RECOMMENDATIONS:   Take medications as prescribed    PENDING TEST RESULTS:   At the time of discharge the following test results are still pending: EGD Biopsies    FOLLOW UP APPOINTMENTS:    Follow-up Information     Follow up With Specialties Details [facility] [employer]  This is the company that provided you with a walker [address] [address]    Cornel Dakins, PA-C Physician Assistant Schedule an appointment as soon as possible for a visit For follow up  [address] [place] [address]      Aye Carrillo MD Gastroenterology Schedule an appointment as soon as possible for a visit For follow up  [address] [id number] [phone]               DIET: Advance as tolerated, soft diet       ACTIVITY: Activity as tolerated    WOUND CARE: None     EQUIPMENT needed: None       DISCHARGE MEDICATIONS:  Current Discharge Medication List      START taking these medications    Details   polyethylene glycol (MIRALAX) 17 gram packet Take 1 Packet by mouth daily as needed for Constipation. Qty: 10 Packet, Refills: 0         CONTINUE these medications which have NOT CHANGED    Details   ondansetron (Zofran ODT) 4 mg disintegrating tablet Take 1 Tab by mouth every eight (8) hours as needed for Nausea. Qty: 10 Tab, Refills: 0      pantoprazole (PROTONIX) 40 mg tablet Take 1 tablet by mouth twice daily before breakfast and dinner for 1 month. After 1 month, please reduce dosage to once daily before breakfast.  Qty: 60 Tab, Refills: 2      atorvastatin (LIPITOR) 80 mg tablet Take 80 mg by mouth nightly. STOP taking these medications       amoxicillin-clavulanate (Augmentin) 500-125 mg per tablet Comments:   Reason for Stopping:         metoclopramide HCl (Reglan) 5 mg tablet Comments:   Reason for Stopping:                 NOTIFY YOUR PHYSICIAN FOR ANY OF THE FOLLOWING:   Fever over 101 degrees for 24 hours. Chest pain, shortness of breath, fever, chills, nausea, vomiting, diarrhea, change in mentation, falling, weakness, bleeding. Severe pain or pain not relieved by medications. Or, any other signs or symptoms that you may have questions about. DISPOSITION:  X  Home With:   OT  PT  HH  RN       Long term SNF/Inpatient Rehab    Independent/assisted living    Hospice    Other:       PATIENT CONDITION AT DISCHARGE:     Functional status    Poor    X Deconditioned     Independent      Cognition   X  Lucid     Forgetful     Dementia      Catheters/lines (plus indication)    Phelps     PICC     PEG    X None      Code status    X Full code     DNR      PHYSICAL EXAMINATION AT DISCHARGE:  General:          Alert, cooperative, no distress, appears stated age. HEENT:           Atraumatic, anicteric sclerae, pink conjunctivae                          No oral ulcers, mucosa moist, throat clear, dentition fair  Neck:               Supple, symmetrical  Lungs:             Clear to auscultation bilaterally. No Wheezing or Rhonchi. No rales.   Chest wall:      No tenderness  No Accessory muscle use. Heart:              Regular  rhythm,  No  murmur   No edema  Abdomen:        Soft, non-tender. Not distended. Bowel sounds normal  Extremities:     No cyanosis. No clubbing,                            Skin turgor normal, Capillary refill normal  Skin:                Not pale. Not Jaundiced  No rashes   Psych:             Not anxious or agitated.   Neurologic:      Alert, moves all extremities, answers questions appropriately and responds to commands       CHRONIC MEDICAL DIAGNOSES:  Problem List as of 4/7/2021 Date Reviewed: 3/19/2021          Codes Class Noted - Resolved    Intractable vomiting ICD-10-CM: R11.10  ICD-9-CM: 536.2  3/29/2021 - Present        Fecal impaction ([facility] ICD-10-CM: K56.41  ICD-9-CM: 560.32  3/29/2021 - Present        Sepsis ([facility] ICD-10-CM: A41.9  ICD-9-CM: 038.9, 995.91  3/19/2021 - Present        Nausea and vomiting ICD-10-CM: R11.2  ICD-9-CM: 787.01  1/21/2016 - Present        Acute chest pain ICD-10-CM: R07.9  ICD-9-CM: 786.50  9/29/2014 - Present              Greater than 45  minutes were spent with the patient on counseling and coordination of care    Signed:   Sharif Stephenson NP  4/7/2021  7:47 AM

## 2021-04-07 NOTE — PROGRESS NOTES
Bedside shift change report given to Daniel Harley (oncoming nurse) by Daren Mcdowell RN (offgoing nurse). Report included the following information SBAR, Procedure Summary, Intake/Output and Recent Results.

## 2021-04-07 NOTE — PROGRESS NOTES
Attempted to schedule hospital follow up PCP appointment. Awaiting call back from the office with appointment information. Pending patient discharge.   Diann Abreu, Care Management Specialist.

## 2021-04-07 NOTE — PROGRESS NOTES
Hospital follow-up PCP transitional care appointment has been scheduled with DEVORA Jiménez for Monday, 4/12/21 at 2:30 p.m. Pending patient discharge.   Celina Hair, Care Management Specialist.

## 2021-04-07 NOTE — PROGRESS NOTES
Transition of Care    RUR: 14%  Disposition: home with daughter  Follow up: pcp  Transport: daughter    CM contacted patient's daughter to notify of discharge. She will be providing transportation home today. CM went over the IMM letter with daughter on the phone. Medicare pt has received, reviewed, and signed 2nd IM letter informing them of their right to appeal the discharge. Signed copied has been placed on pt bedside chart.     Rudy MAK, ACM-SW

## 2021-04-08 ENCOUNTER — PATIENT OUTREACH (OUTPATIENT)
Dept: CASE MANAGEMENT | Age: 76
End: 2021-04-08

## 2021-04-08 NOTE — PROGRESS NOTES
Care Transitions Subsequent Call    Additional needs identified to be addressed with provider yes  Clarification about: discontinuing reglan; starting pantoprazole 40 mg BID for 30 days- AVS states continue- daughter reports PTA she was taking omeprazole 20 mg BID- would need RX if she is to start pantoprazole. - CTN reached out to GI provider to clarify- stop reglan. Office to check with provider if she is to take pantoprazole- if so they will send in RX to [facility] on file. Determine follow up with GI. Next appt is in  per office- they will check with provider. CTN asked daughter to check with office after barium swallow study done- [facility] at [facility]. Method of communication with provider : phone    Discussed [phone] related testing which was not done at this time. Test results were not done. Patient informed of results, if available? NA     Care Transition Nursecontacted the daughterNayana by telephone to follow up after admission on 3/29/21. Verified name and  with family as identifiers. Addressed changes since last contact: symptom management-GI-swallowing and eating ability, moving bowels, follow up appointment-for GI and PCP  and medication management-GI- post EGD-dilation and bowels  Discharge needs reviewed: family declined [facility]. Clarified discharge medications listed. Follow up appointment completed? no   Was follow up appointment scheduled within 7 days of discharge? yes     Advance Care Planning:   Does patient have an Advance Directive:  AMD on file- CTN clarified last phone- dated 2014 is accurate     Care Transition Nurse reviewed discharge instructions, medical action plan and red flags with family and discussed any barriers to care and/or understanding of plan of care after discharge.  Discussed appropriate site of care based on symptoms and resources available to patient including: Specialist and using provider patient portal to communicate concerns and questions. The family agrees to contact the PCP office for questions related to their healthcare. Patients top risk factors for readmission: medical condition and medication management pneumonia  Interventions to address risk factors: Communication with specialists who will assume or re-assume care of the patient's system-specific [id number], Education of patient/family/caregiver/guardian to support self-management-bowel regimen and post PNA recovery and Assessment and support for treatment adherence and medication management-GI medications    [address] follow up appointment(s):   Future Appointments   Date Time Provider Macario Alaniz   4/21/2021  8:30 AM [address] [place] [address] XR OP [facility]   8/31/2021  5:00 PM G1 ANICET[facility] [address]     Non-[facility] follow up appointment(s):   PCP- DEVORA Medina-  4/12-VV. GI- Dr. Patrick Baltazar- June, sooner once swallowing test scheduled for 4/21. Plan for follow-up call in 10-14 days based on severity of symptoms and risk factors. Plan for next call: self management-bowel regimen, post PNA recovery and medication management-GI meds  Care Transition Nurse provided contact information for future needs. Goals Addressed                 This Visit's Progress       General     Reduce Risk of Hospitalization        4/8/21- spoke with daughter, Simi Blake. She said mom is doing much better- no further GI symptoms- not coughing with eating at this time. Simi Blake made reference that she thought she had \"been cleaned out when they sent her home in [place]". Explained daily miralax recommended unless she has loose stools. Encouraged her to give daily not PRN. Clarified with Dr. Yumi Omalley- she is to not take reglan- she is not having emesis- if this returns, Simi Blake is to call his office to ask for guidance. She is scheduled for modified barium swallow at [address] [place] [address] on 4/21- r/o aspiration.  Daughter states that he has told her that if mom is aspirating- she may need feeding tube placed. CTN asked the GI office to clarify if she is to take pantaprazole 40 mg BID for 30 days as listed on discharge instructions- she had been taking omeprazole 20 mg BID. Office states they will check with provider and send if script for pantoprazole if he wants her to take. For now next appt is in June with GI. Asked daughter to follow up after swallowing test is done- provider may touch basis on the phone with plan from results or if she needs to be seen sooner. [facility]     3/23/21- spoke with daughter, Mamadou Astorga. She relays that mom is alert and oriented but she will \"eat foods that she knows she should not eat\" and will \"not tell daughter if she is vomiting\". Daughter has ordered new -Cam to be able to watch mom when she is in her room. She has one in place in the kitchen/den area. 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 Yoanna's spouse has disabilities- ADHD, Austistic, etc.  His mother helps. Chloe Benson states she has a decreased immune system. Her son lives with them- but he does work. Mom has been eating without incidence. Daughter was also confused about orders for reglan- CTN contacted GI office- office will page team to answer. Return call from [place], [place] with GI- they want her not to take reglan at this time. CTN spoke later with daughter, Chloe Benson. She will stop administering. She has a VV with Dr. Vijaya Nair on 3/26 to determine plan since procedure attempted on 3/11 was not successful- mom did not tolerate scope being passed down throat.       [employer]

## 2021-04-21 ENCOUNTER — HOSPITAL ENCOUNTER (OUTPATIENT)
Dept: GENERAL RADIOLOGY | Age: 76
Discharge: HOME | End: 2021-04-21
Attending: SPECIALIST
Payer: MEDICARE

## 2021-04-21 DIAGNOSIS — R05.9 COUGH: ICD-10-CM

## 2021-04-21 DIAGNOSIS — R11.2 NAUSEA & VOMITING: ICD-10-CM

## 2021-04-21 PROCEDURE — 74230 X-RAY XM SWLNG FUNCJ C+: CPT

## 2021-04-21 PROCEDURE — 92611 MOTION FLUOROSCOPY/SWALLOW: CPT

## 2021-04-21 NOTE — PROGRESS NOTES
[address] [facility], [address] [facility],[address] STUDY  Patient: Elijah Santiago (21 y.o. female)  Date: 4/21/2021  Referring Provider:  Dr. Truman Chanel:   Patient with hx of stricture in distal esophagus with recent esophogeal dilation. Patient is currently on a puree/thin liquid diet at home with occasional solids as tolerated, caregiver reports patient will sometimes throw up solids and \"sound gurgly\" after thins. Patient had a feeding tube in 2017 related to esophogeal issues. OBJECTIVE:   Past Medical History:   Past Medical History:   Diagnosis Date    Arthritis     Deaf, nonspeaking     High cholesterol     Inner ear dysfunction      Past Surgical History:   Procedure Laterality Date    HX HYSTERECTOMY       Current Dietary Status:  Puree/thin with occasional solids as tolerated   Radiologist: Dr. Tim Hickman Views: Lateral;Fluoro;AP  Patient Position: standing    Trial 1:   Consistency Presented: Thin liquid; Solid;Puree   How Presented: Successive swallows;Straw;Spoon;Cup/sip; Self-fed/presented;SLP-fed/presented       Bolus Acceptance: No impairment   Bolus Formation/Control: No impairment:     Propulsion: No impairment   Oral Residue: None   Initiation of Swallow: No impairment   Timing: No impairment   Penetration: None   Aspiration/Timing: No evidence of aspiration   Pharyngeal Clearance: No residue       Effective Modifications: None   Cues for Modifications: None       Decreased Tongue Base Retraction?: No  Laryngeal Elevation: WFL (within functional limits)  Aspiration/Penetration Score: 1 (No penetration or aspiration-Contrast does not enter the airway)  Pharyngeal Symmetry: Symmetrical  Pharyngeal-Esophageal Segment: Decreased relaxation of upper esophageal segment; Suspected esophageal dysphagia  Pharyngeal Dysfunction: Crico-pharyngeal dysfunction    Oral Phase Severity: No impairment  Pharyngeal Phase Severity: N/A    ASSESSMENT :  Based on the objective data described above, the patient presents with [facility] oral/pharyngeal swallow with penetration/aspiration on thin, puree, and solids nor significant oropharyngeal residue. Patient with prominent crico pharyngeus impacting the flow of bolus indicative of chronic reflux. Of note, patient also with decreased relaxation of the upper esophageal segment. Known hx of esophageal dysphagia per GI and noted stasis at the level of the sternum appreciated in AP view. Suspect pt's continued swallow dysfunction is the direct result of known esophageal dysphagia with other GI concerns, which is consistent with esophageal stasis, and known esophageal stricture/dysmotility. Therefore, recommend continued follow-up with GI to address these deficits, and will defer to GI for diet advancement. PLAN/RECOMMENDATIONS :  --Diet per GI  --No oropharyngeal deficits; no further SLP     COMMUNICATION/EDUCATION:   The above findings and recommendations were discussed with: faxed to PCP who verbalized understanding.     Thank you for this referral.  Jenny Ortega

## 2021-08-27 RX ORDER — ZOLEDRONIC ACID 5 MG/100ML
5 INJECTION, SOLUTION INTRAVENOUS ONCE
Status: DISCONTINUED | OUTPATIENT
Start: 2021-08-31 | End: 2021-08-31

## 2021-08-31 ENCOUNTER — HOSPITAL ENCOUNTER (OUTPATIENT)
Dept: INFUSION THERAPY | Age: 76
Discharge: HOME | End: 2021-08-31

## 2022-03-19 PROBLEM — K56.41 FECAL IMPACTION (HCC): Status: ACTIVE | Noted: 2021-03-29

## 2022-03-19 PROBLEM — R11.10 INTRACTABLE VOMITING: Status: ACTIVE | Noted: 2021-03-29

## 2022-03-20 PROBLEM — A41.9 SEPSIS (HCC): Status: ACTIVE | Noted: 2021-03-19

## 2023-05-11 RX ORDER — ATORVASTATIN CALCIUM 80 MG/1
80 TABLET, FILM COATED ORAL NIGHTLY
COMMUNITY

## 2023-05-11 RX ORDER — PANTOPRAZOLE SODIUM 40 MG/1
TABLET, DELAYED RELEASE ORAL
COMMUNITY
Start: 2016-01-26

## 2023-05-11 RX ORDER — POLYETHYLENE GLYCOL 3350 17 G/17G
POWDER, FOR SOLUTION ORAL DAILY PRN
COMMUNITY
Start: 2021-04-06

## 2023-05-11 RX ORDER — ONDANSETRON 4 MG/1
TABLET, ORALLY DISINTEGRATING ORAL EVERY 8 HOURS PRN
COMMUNITY
Start: 2020-12-16

## 2024-03-23 NOTE — PROGRESS NOTES
Bedside shift change report given to Dinorah Gonzalez (oncoming nurse) by Evita Alexander (offgoing nurse). Report included the following information SBAR, Kardex, MAR and Recent Results. not applicable

## (undated) DEVICE — SYR ASSEMB INFL BLLN 60ML --

## (undated) DEVICE — Device

## (undated) DEVICE — SYRINGE 50ML E/T

## (undated) DEVICE — FORCEPS BX L240CM JAW DIA2.8MM L CAP W/ NDL MIC MESH TOOTH

## (undated) DEVICE — ESOPHAGEAL/PYLORIC/COLONIC/BILIARY WIREGUIDED BALLOON DILATATION CATHETER: Brand: CRE™ PRO

## (undated) DEVICE — TUBING HYDR IRR --

## (undated) DEVICE — SYR 10ML LUER LOK 1/5ML GRAD --

## (undated) DEVICE — BASIN EMSIS 16OZ GRAPHITE PLAS KID SHP MOLD GRAD FOR ORAL

## (undated) DEVICE — ESOPHAGEAL BALLOON DILATATION CATHETER: Brand: CRE FIXED WIRE